# Patient Record
Sex: FEMALE | Race: WHITE | Employment: OTHER | URBAN - METROPOLITAN AREA
[De-identification: names, ages, dates, MRNs, and addresses within clinical notes are randomized per-mention and may not be internally consistent; named-entity substitution may affect disease eponyms.]

---

## 2017-01-05 ENCOUNTER — TELEPHONE (OUTPATIENT)
Dept: INTERNAL MEDICINE CLINIC | Facility: CLINIC | Age: 67
End: 2017-01-05

## 2017-01-05 NOTE — TELEPHONE ENCOUNTER
Received refill request for glimepiride 1 mg but it is not in the current list of medications. Notes from 5/2/16 and 7/21/16 includes medication in the note but 10/19/16 note doesn't. Is patient still supposed to take medication? Please advise.

## 2017-01-06 ENCOUNTER — TELEPHONE (OUTPATIENT)
Dept: INTERNAL MEDICINE CLINIC | Facility: CLINIC | Age: 67
End: 2017-01-06

## 2017-01-06 RX ORDER — GLIMEPIRIDE 1 MG/1
1 TABLET ORAL 2 TIMES DAILY
Qty: 180 TABLET | Refills: 1 | Status: SHIPPED | OUTPATIENT
Start: 2017-01-06 | End: 2017-07-06

## 2017-01-06 NOTE — TELEPHONE ENCOUNTER
Pharmacy request refill on glimepiride 1 mg tablet #180 directions 1 tablet 2 times a day. Not on patient's med list.  Pharmacy request comments \"patient is running low on meds.   If possible please approve refill ASAP\"  Left message on voice mail asking

## 2017-02-10 ENCOUNTER — TELEPHONE (OUTPATIENT)
Dept: INTERNAL MEDICINE CLINIC | Facility: CLINIC | Age: 67
End: 2017-02-10

## 2017-02-21 ENCOUNTER — TELEPHONE (OUTPATIENT)
Dept: INTERNAL MEDICINE CLINIC | Facility: CLINIC | Age: 67
End: 2017-02-21

## 2017-02-21 RX ORDER — INSULIN LISPRO 100 [IU]/ML
INJECTION, SUSPENSION SUBCUTANEOUS
Qty: 15 PEN | Refills: 0 | Status: SHIPPED | OUTPATIENT
Start: 2017-02-21 | End: 2017-04-21

## 2017-02-24 ENCOUNTER — TELEPHONE (OUTPATIENT)
Dept: INTERNAL MEDICINE CLINIC | Facility: CLINIC | Age: 67
End: 2017-02-24

## 2017-04-21 ENCOUNTER — TELEPHONE (OUTPATIENT)
Dept: INTERNAL MEDICINE CLINIC | Facility: CLINIC | Age: 67
End: 2017-04-21

## 2017-04-21 PROBLEM — J44.89 ASTHMA WITH COPD (CHRONIC OBSTRUCTIVE PULMONARY DISEASE) (HCC): Chronic | Status: ACTIVE | Noted: 2017-04-21

## 2017-04-21 PROBLEM — J44.9 ASTHMA WITH COPD (CHRONIC OBSTRUCTIVE PULMONARY DISEASE) (HCC): Chronic | Status: ACTIVE | Noted: 2017-04-21

## 2017-04-21 PROBLEM — J44.89 ASTHMA WITH COPD (CHRONIC OBSTRUCTIVE PULMONARY DISEASE): Chronic | Status: ACTIVE | Noted: 2017-04-21

## 2017-04-21 PROBLEM — J44.9 ASTHMA WITH COPD (CHRONIC OBSTRUCTIVE PULMONARY DISEASE): Chronic | Status: ACTIVE | Noted: 2017-04-21

## 2017-04-21 RX ORDER — INSULIN LISPRO 100 [IU]/ML
INJECTION, SUSPENSION SUBCUTANEOUS
Qty: 15 PEN | Refills: 0 | Status: SHIPPED | OUTPATIENT
Start: 2017-04-21 | End: 2017-06-13

## 2017-04-21 NOTE — TELEPHONE ENCOUNTER
Current outpatient prescriptions:     •  MetFORMIN HCl 1000 MG Oral Tab, Take 1 tablet (1,000 mg total) by mouth 2 (two) times daily with meals. , Disp: 180 tablet, Rfl: 0 REFILL   SHE IS OUT OF THIS MEDICATION

## 2017-04-27 ENCOUNTER — OFFICE VISIT (OUTPATIENT)
Dept: OPTOMETRY | Facility: CLINIC | Age: 67
End: 2017-04-27

## 2017-04-27 DIAGNOSIS — H52.4 HYPEROPIA WITH ASTIGMATISM AND PRESBYOPIA, BILATERAL: ICD-10-CM

## 2017-04-27 DIAGNOSIS — H52.203 HYPEROPIA WITH ASTIGMATISM AND PRESBYOPIA, BILATERAL: ICD-10-CM

## 2017-04-27 DIAGNOSIS — H25.13 AGE-RELATED NUCLEAR CATARACT OF BOTH EYES: ICD-10-CM

## 2017-04-27 DIAGNOSIS — H52.03 HYPEROPIA WITH ASTIGMATISM AND PRESBYOPIA, BILATERAL: ICD-10-CM

## 2017-04-27 DIAGNOSIS — E11.9 CONTROLLED TYPE 2 DIABETES MELLITUS WITHOUT COMPLICATION, UNSPECIFIED LONG TERM INSULIN USE STATUS: Primary | ICD-10-CM

## 2017-04-27 PROBLEM — H52.00 HYPEROPIA WITH ASTIGMATISM AND PRESBYOPIA: Status: ACTIVE | Noted: 2017-04-27

## 2017-04-27 PROBLEM — H52.209 HYPEROPIA WITH ASTIGMATISM AND PRESBYOPIA: Status: ACTIVE | Noted: 2017-04-27

## 2017-04-27 PROCEDURE — G0463 HOSPITAL OUTPT CLINIC VISIT: HCPCS | Performed by: OPTOMETRIST

## 2017-04-27 PROCEDURE — 99214 OFFICE O/P EST MOD 30 MIN: CPT | Performed by: OPTOMETRIST

## 2017-04-27 PROCEDURE — 92015 DETERMINE REFRACTIVE STATE: CPT | Performed by: OPTOMETRIST

## 2017-04-27 NOTE — PROGRESS NOTES
Dev Pearson is a 61year old female. HPI:     HPI     Diabetic Eye Exam   Diabetes characteristics include Type 2, controlled with diet, taking oral medications and on insulin. Duration of 9 years.            Comments   Patient is in for an annual diab (HUMALOG MIX 75/25 KWIKPEN) (75-25) 100 UNIT/ML SC SUPN INJECT 38 UNITS SUBCUTANEOUSLY IN THE MORNING AND 36 UNITS IN THE EVENING Disp: 15 pen Rfl: 2   furosemide 20 MG Oral Tab Take 1 tablet (20 mg total) by mouth once daily.  Disp: 90 tablet Rfl: 1   Dilt Visual Fields      Left Right   Result Full Full         Extraocular Movement      Right Left   Result Full, Ortho Full, Ortho         Neuro/Psych     Oriented x3:  Yes    Mood/Affect:  Normal      Dilation     Both eyes:  1.0% Mydriacyl @ 11:29 AM changes or problems with their vision     Hyperopia with astigmatism and presbyopia  New glasses RX given to update as needed. No orders of the defined types were placed in this encounter.        Meds This Visit:    No prescriptions requested or orde

## 2017-04-27 NOTE — PATIENT INSTRUCTIONS
Age related cataract  No treatment is required. Will continue to observe.     Diabetes type 2, controlled ([facility] [address]  I advised patient that there is no background diabetic retinopathy in either eye and that they should continue to keep their blood sugar under c

## 2017-05-05 ENCOUNTER — TELEPHONE (OUTPATIENT)
Dept: INTERNAL MEDICINE CLINIC | Facility: CLINIC | Age: 67
End: 2017-05-05

## 2017-05-05 NOTE — TELEPHONE ENCOUNTER
Medication refilled by Dr. Arnold Bonds 3/12/17; patient is requesting 90 day supply of Metoprolol. Medication pended. Last office visit 11/2016. Routing to RN [employer].

## 2017-05-12 RX ORDER — BENAZEPRIL HYDROCHLORIDE 20 MG/1
20 TABLET ORAL 2 TIMES DAILY
Qty: 60 TABLET | Refills: 0 | Status: SHIPPED | OUTPATIENT
Start: 2017-05-12 | End: 2017-06-12

## 2017-05-12 NOTE — TELEPHONE ENCOUNTER
LOV 11/28/16  Informed pt per Dr. Varma Brain message to make appt. appt scheduled on 5/18/17 medicare px  30 day supply sent to pharmacy. KPA pt verbalized understanding.

## 2017-06-10 ENCOUNTER — TELEPHONE (OUTPATIENT)
Dept: INTERNAL MEDICINE CLINIC | Facility: CLINIC | Age: 67
End: 2017-06-10

## 2017-06-13 ENCOUNTER — OFFICE VISIT (OUTPATIENT)
Dept: INTERNAL MEDICINE CLINIC | Facility: CLINIC | Age: 67
End: 2017-06-13

## 2017-06-13 ENCOUNTER — TELEPHONE (OUTPATIENT)
Dept: INTERNAL MEDICINE CLINIC | Facility: CLINIC | Age: 67
End: 2017-06-13

## 2017-06-13 VITALS
TEMPERATURE: 99 F | WEIGHT: 279.19 LBS | HEART RATE: 72 BPM | HEIGHT: 62 IN | SYSTOLIC BLOOD PRESSURE: 124 MMHG | DIASTOLIC BLOOD PRESSURE: 78 MMHG | BODY MASS INDEX: 51.38 KG/M2 | OXYGEN SATURATION: 90 %

## 2017-06-13 DIAGNOSIS — Z00.00 ROUTINE MEDICAL EXAM: Primary | ICD-10-CM

## 2017-06-13 DIAGNOSIS — R06.00 DOE (DYSPNEA ON EXERTION): ICD-10-CM

## 2017-06-13 DIAGNOSIS — N28.9 RENAL INSUFFICIENCY: ICD-10-CM

## 2017-06-13 DIAGNOSIS — R60.9 PERIPHERAL EDEMA: ICD-10-CM

## 2017-06-13 DIAGNOSIS — Z12.39 BREAST CANCER SCREENING: ICD-10-CM

## 2017-06-13 DIAGNOSIS — E11.9 TYPE 2 DIABETES MELLITUS WITHOUT COMPLICATION, WITH LONG-TERM CURRENT USE OF INSULIN (HCC): ICD-10-CM

## 2017-06-13 DIAGNOSIS — Z79.4 TYPE 2 DIABETES MELLITUS WITHOUT COMPLICATION, WITH LONG-TERM CURRENT USE OF INSULIN (HCC): ICD-10-CM

## 2017-06-13 DIAGNOSIS — I25.709 CORONARY ARTERY DISEASE INVOLVING CORONARY BYPASS GRAFT OF NATIVE HEART WITH ANGINA PECTORIS (HCC): ICD-10-CM

## 2017-06-13 DIAGNOSIS — T30.0 SKIN BURN: ICD-10-CM

## 2017-06-13 DIAGNOSIS — I10 HTN (HYPERTENSION), BENIGN: ICD-10-CM

## 2017-06-13 DIAGNOSIS — Z78.0 POSTMENOPAUSAL: ICD-10-CM

## 2017-06-13 DIAGNOSIS — E53.8 VITAMIN B12 DEFICIENCY: ICD-10-CM

## 2017-06-13 DIAGNOSIS — E78.00 PURE HYPERCHOLESTEROLEMIA: ICD-10-CM

## 2017-06-13 DIAGNOSIS — E04.1 THYROID NODULE: ICD-10-CM

## 2017-06-13 PROCEDURE — 81003 URINALYSIS AUTO W/O SCOPE: CPT | Performed by: INTERNAL MEDICINE

## 2017-06-13 PROCEDURE — 99396 PREV VISIT EST AGE 40-64: CPT | Performed by: INTERNAL MEDICINE

## 2017-06-13 PROCEDURE — 99214 OFFICE O/P EST MOD 30 MIN: CPT | Performed by: INTERNAL MEDICINE

## 2017-06-13 PROCEDURE — G0463 HOSPITAL OUTPT CLINIC VISIT: HCPCS | Performed by: INTERNAL MEDICINE

## 2017-06-13 PROCEDURE — 36415 COLL VENOUS BLD VENIPUNCTURE: CPT | Performed by: INTERNAL MEDICINE

## 2017-06-13 RX ORDER — BENAZEPRIL HYDROCHLORIDE 20 MG/1
20 TABLET ORAL 2 TIMES DAILY
Qty: 60 TABLET | Refills: 0 | Status: SHIPPED | OUTPATIENT
Start: 2017-06-13 | End: 2017-07-24

## 2017-06-13 RX ORDER — INSULIN LISPRO 100 [IU]/ML
INJECTION, SUSPENSION SUBCUTANEOUS
Qty: 15 PEN | Refills: 0 | Status: SHIPPED | OUTPATIENT
Start: 2017-06-13 | End: 2017-06-20

## 2017-06-13 RX ORDER — LANCETS 30 GAUGE
EACH MISCELLANEOUS
Qty: 50 EACH | Refills: 6 | Status: SHIPPED | OUTPATIENT
Start: 2017-06-13 | End: 2017-06-14

## 2017-06-13 RX ORDER — FUROSEMIDE 20 MG/1
20 TABLET ORAL
Qty: 90 TABLET | Refills: 1 | Status: SHIPPED | OUTPATIENT
Start: 2017-06-13 | End: 2017-08-25

## 2017-06-13 RX ORDER — ALBUTEROL SULFATE 2.5 MG/3ML
2.5 SOLUTION RESPIRATORY (INHALATION) EVERY 4 HOURS PRN
Qty: 30 AMPULE | Refills: 3 | Status: SHIPPED | OUTPATIENT
Start: 2017-06-13 | End: 2020-06-19

## 2017-06-13 NOTE — TELEPHONE ENCOUNTER
Pharmacy called, asked what blood glucose machine patient uses and what type of strips she needs.  Requested for patient to call back

## 2017-06-13 NOTE — PATIENT INSTRUCTIONS
ASSESSMENT/PLAN:   Routine medical exam  (primary encounter diagnosis) Check blood and urine. Type 2 diabetes mellitus without complication, with long-term current use of insulin (hcc) Slightly higher.  Increase insulin to 42 units premeals every 12 hrs A1C [E]  TSH W Reflex To Free T4 [E]  Vitamin B12    Meds This Visit:    Signed Prescriptions Disp Refills    Insulin Lispro Prot & Lispro (HUMALOG MIX 75/25 KWIKPEN) (75-25) 100 UNIT/ML SC SUPN 15 pen 0      Sig: INJECT 42 UNITS SUBCUTANEOUSLY IN THE [facility]

## 2017-06-13 NOTE — PROGRESS NOTES
HPI:    Patient ID: Florence Salomon is a 61year old female.     HPI  Florence Salomon is a 61year old female who presents for a complete physical exam.   HPI:   Patient presents with:  Physical     REASON FOR VISIT:    Florence Salomon is a 61year old female who pr for: HIV    Syphilis Screening Screen if pregnant or high risk No results found for: RPR    Hepatitis C Screening Screen those at high risk plus screen one time for adults born 1943-1 965 No results found for: HCVAB    Tuberculosis Screen if high risk No c (SYMBICORT) 160-4.5 MCG/ACT Inhalation Aerosol INHALE ONE PUFF BY MOUTH TWICE DAILY Disp: 3 Inhaler Rfl: 1   Albuterol Sulfate HFA (PROAIR HFA) 108 (90 BASE) MCG/ACT Inhalation Aero Soln Inhale 2 puffs into the lungs every 4 (four) hours as needed for [facility] Checks sugars 1 times daily. Fasting sugars average 140-190's. No lows. moderately active   Watches diabetic diet, low salt.   Wt Readings from Last 3 Encounters:  06/13/17 : 279 lb 3.2 oz (126.644 kg)  11/28/16 : 271 lb (122.925 kg)  10/19/16 : 276 lb 03:32 PM   ALT 13* 06/13/2017 03:32 PM   BILT 1.1 06/13/2017 03:32 PM   TSH 0.80 06/13/2017 03:32 PM          Lab Results  Component Value Date/Time   CHOLEST 129 06/13/2017 03:32 PM   HDL 32 06/13/2017 03:32 PM   TRIG 205* 06/13/2017 03:32 PM   LDL 56 06/ Vitro Strip CHECK BLOOD GLUCOSE EVERY 12 HOURS Disp: 48 each Rfl: 6   Budesonide-Formoterol Fumarate (SYMBICORT) 160-4.5 MCG/ACT Inhalation Aerosol INHALE ONE PUFF BY MOUTH TWICE DAILY Disp: 3 Inhaler Rfl: 1   Albuterol Sulfate HFA (PROAIR HFA) 108 (90 BAS HISTORY:  Obstetric History     T2    TAB0  SAB0  E0  M1  L4      Hx of Pap: all Paps normal           Review of Systems   Constitutional: Negative for fever, chills, diaphoresis, activity change, appetite change, fatigue and unexpected weight (126.644 kg)  BMI 51.05 kg/m2  SpO2 90%   L/min  Breastfeeding? No   No LMP recorded. Patient is postmenopausal.   PHYSICAL EXAM:    Physical Exam   Constitutional: She is oriented to person, place, and time.  Vital signs are normal. She appears well- exhibits no chemosis, no discharge, no exudate and no hordeolum. No foreign body present in the right eye. Left eye exhibits no chemosis, no discharge, no exudate and no hordeolum. No foreign body present in the left eye. Right conjunctiva is not injected. rigidity, no rebound, no guarding and no CVA tenderness. Genitourinary: No breast swelling, tenderness, discharge or bleeding. Pelvic exam was performed with patient supine. Musculoskeletal: She exhibits no edema.         Cervical back: She exhibits dec without complication, with long-term current use of insulin (hcc) Slightly higher. Increase insulin to 42 units premeals every 12 hrs. Call in 1 week with sugars. Check blood. Careful with diet and excercise at least 30 minutes 3-4 times a week.  Check sug Lispro Prot & Lispro (HUMALOG MIX 75/25 KWIKPEN) (75-25) 100 UNIT/ML SC SUPN 15 pen 0      Sig: INJECT 42 UNITS SUBCUTANEOUSLY IN THE MORNING AND 42 UNITS IN THE EVENING      albuterol sulfate (2.5 MG/3ML) 0.083% Inhalation Nebu Soln 30 ampule 3      Sig: Checks qam.  -     albuterol sulfate (2.5 MG/3ML) 0.083% Inhalation Nebu Soln; Take 3 mL (2.5 mg total) by nebulization every 4 (four) hours as needed for Wheezing. The patient indicates understanding of these issues and agrees to the plan.   Diet cou

## 2017-06-14 PROBLEM — E78.2 COMBINED HYPERLIPIDEMIA: Status: ACTIVE | Noted: 2017-06-14

## 2017-06-14 PROBLEM — IMO0001 UNCONTROLLED TYPE 2 DIABETES MELLITUS WITHOUT COMPLICATION, WITH LONG-TERM CURRENT USE OF INSULIN: Status: ACTIVE | Noted: 2017-06-14

## 2017-06-14 PROBLEM — E53.8 VITAMIN B 12 DEFICIENCY: Status: ACTIVE | Noted: 2017-06-14

## 2017-06-14 RX ORDER — LANCETS 30 GAUGE
EACH MISCELLANEOUS
Qty: 50 EACH | Refills: 6 | Status: SHIPPED | OUTPATIENT
Start: 2017-06-14 | End: 2018-09-10

## 2017-06-15 NOTE — PROGRESS NOTES
Quick Note:    CMP Normal (electrolytes, sugar, kidney and liver functions),   Lipid (choilesterol) is good, triglycerides are elevated possibly from sugars being high. Lower carb diet. Would recheck in 6 months. Fish oil thousand a day to keep blood thin.

## 2017-06-20 ENCOUNTER — NURSE ONLY (OUTPATIENT)
Dept: INTERNAL MEDICINE CLINIC | Facility: CLINIC | Age: 67
End: 2017-06-20

## 2017-06-20 ENCOUNTER — TELEPHONE (OUTPATIENT)
Dept: INTERNAL MEDICINE CLINIC | Facility: CLINIC | Age: 67
End: 2017-06-20

## 2017-06-20 DIAGNOSIS — E53.8 B12 DEFICIENCY: Primary | ICD-10-CM

## 2017-06-20 PROCEDURE — 96372 THER/PROPH/DIAG INJ SC/IM: CPT | Performed by: INTERNAL MEDICINE

## 2017-06-20 RX ORDER — CYANOCOBALAMIN 1000 UG/ML
1000 INJECTION INTRAMUSCULAR; SUBCUTANEOUS ONCE
Status: COMPLETED | OUTPATIENT
Start: 2017-06-20 | End: 2017-06-20

## 2017-06-20 RX ORDER — INSULIN LISPRO 100 [IU]/ML
INJECTION, SUSPENSION SUBCUTANEOUS
Qty: 45 PEN | Refills: 0 | Status: SHIPPED | OUTPATIENT
Start: 2017-06-20 | End: 2017-12-14

## 2017-06-20 RX ADMIN — CYANOCOBALAMIN 1000 MCG: 1000 INJECTION INTRAMUSCULAR; SUBCUTANEOUS at 11:45:00

## 2017-06-26 RX ORDER — SIMVASTATIN 40 MG
40 TABLET ORAL NIGHTLY
Qty: 90 TABLET | Refills: 1 | Status: SHIPPED | OUTPATIENT
Start: 2017-06-26 | End: 2017-12-02

## 2017-06-27 ENCOUNTER — NURSE ONLY (OUTPATIENT)
Dept: INTERNAL MEDICINE CLINIC | Facility: CLINIC | Age: 67
End: 2017-06-27

## 2017-06-27 RX ORDER — CYANOCOBALAMIN 1000 UG/ML
1000 INJECTION INTRAMUSCULAR; SUBCUTANEOUS ONCE
Status: COMPLETED | OUTPATIENT
Start: 2017-06-27 | End: 2017-06-27

## 2017-06-27 RX ADMIN — CYANOCOBALAMIN 1000 MCG: 1000 INJECTION INTRAMUSCULAR; SUBCUTANEOUS at 12:19:00

## 2017-07-06 ENCOUNTER — NURSE ONLY (OUTPATIENT)
Dept: INTERNAL MEDICINE CLINIC | Facility: CLINIC | Age: 67
End: 2017-07-06

## 2017-07-06 ENCOUNTER — TELEPHONE (OUTPATIENT)
Dept: INTERNAL MEDICINE CLINIC | Facility: CLINIC | Age: 67
End: 2017-07-06

## 2017-07-06 DIAGNOSIS — E53.8 VITAMIN B12 DEFICIENCY: Primary | ICD-10-CM

## 2017-07-06 PROCEDURE — 96372 THER/PROPH/DIAG INJ SC/IM: CPT | Performed by: INTERNAL MEDICINE

## 2017-07-06 RX ORDER — GLIMEPIRIDE 1 MG/1
TABLET ORAL
Qty: 180 TABLET | Refills: 1 | Status: SHIPPED | OUTPATIENT
Start: 2017-07-06 | End: 2017-12-14

## 2017-07-06 RX ORDER — CYANOCOBALAMIN 1000 UG/ML
1000 INJECTION INTRAMUSCULAR; SUBCUTANEOUS ONCE
Status: COMPLETED | OUTPATIENT
Start: 2017-07-06 | End: 2017-07-06

## 2017-07-06 RX ADMIN — CYANOCOBALAMIN 1000 MCG: 1000 INJECTION INTRAMUSCULAR; SUBCUTANEOUS at 11:32:00

## 2017-07-06 NOTE — PROGRESS NOTES
Patient seen for 3rd B12 injection. Verified name and date of birth. Verified order from test result 6/13/17. Administered shot to left deltoid, patient tolerated shot, no reactions noted.

## 2017-07-06 NOTE — TELEPHONE ENCOUNTER
Current Outpatient Prescriptions:     •  glimepiride 1 MG Oral Tab, Take 1 tablet (1 mg total) by mouth 2 (two) times daily. , Disp: 180 tablet, Rfl: 1    REFILL

## 2017-07-10 ENCOUNTER — TELEPHONE (OUTPATIENT)
Dept: INTERNAL MEDICINE CLINIC | Facility: CLINIC | Age: 67
End: 2017-07-10

## 2017-07-10 NOTE — TELEPHONE ENCOUNTER
Called patient mail order pharmacy [facility] pharmacy suggested she try this for her knee. Said she is willing to try.

## 2017-07-11 NOTE — TELEPHONE ENCOUNTER
Pharmacy called asking about lidocaine and fluocinonide, pharmacist was told that Dr. Black Nicolas did ok the Rx but was not sure if insurance would cover these meds, pharmacist stated that he would be checking with the insurance so pt wouldn't have to.

## 2017-07-11 NOTE — TELEPHONE ENCOUNTER
Patient notified should check if scripts will be covered under insurance, and let me know if she wants it sent to the pharmacy. Patient agreed.

## 2017-07-13 ENCOUNTER — NURSE ONLY (OUTPATIENT)
Dept: INTERNAL MEDICINE CLINIC | Facility: CLINIC | Age: 67
End: 2017-07-13

## 2017-07-13 ENCOUNTER — TELEPHONE (OUTPATIENT)
Dept: INTERNAL MEDICINE CLINIC | Facility: CLINIC | Age: 67
End: 2017-07-13

## 2017-07-13 DIAGNOSIS — E53.8 B12 DEFICIENCY: Primary | ICD-10-CM

## 2017-07-13 PROCEDURE — 96372 THER/PROPH/DIAG INJ SC/IM: CPT | Performed by: INTERNAL MEDICINE

## 2017-07-13 RX ORDER — CLOBETASOL PROPIONATE 0.5 MG/G
1 OINTMENT TOPICAL 2 TIMES DAILY
Qty: 30 G | Refills: 1 | Status: SHIPPED | OUTPATIENT
Start: 2017-07-13 | End: 2017-07-17

## 2017-07-13 RX ORDER — CYANOCOBALAMIN 1000 UG/ML
1000 INJECTION INTRAMUSCULAR; SUBCUTANEOUS ONCE
Status: COMPLETED | OUTPATIENT
Start: 2017-07-13 | End: 2017-07-13

## 2017-07-13 RX ADMIN — CYANOCOBALAMIN 1000 MCG: 1000 INJECTION INTRAMUSCULAR; SUBCUTANEOUS at 11:15:00

## 2017-07-13 NOTE — TELEPHONE ENCOUNTER
[id number] [address] please advise, I do not see fluocinonide in pt medication profile, past or present. Can send new rx for clobetasol 0.05% but need sig and rf.

## 2017-07-14 NOTE — TELEPHONE ENCOUNTER
Cirilo Jean from [address] called regarding Fluocinonide rx being changed to Clobetason 0.05%. Informed rx was sent to [facility] pharmacy and would have to verify with pt where she wanted rx to be sent.  Informed would call back if pt does want rx to be sent to A

## 2017-07-17 RX ORDER — CLOBETASOL PROPIONATE 0.5 MG/G
1 OINTMENT TOPICAL 2 TIMES DAILY
Qty: 30 G | Refills: 1 | Status: SHIPPED | OUTPATIENT
Start: 2017-07-17

## 2017-07-17 NOTE — TELEPHONE ENCOUNTER
[facility] is asking [facility] prescription for this patient. Fax is [phone]. See Rx.

## 2017-07-19 NOTE — TELEPHONE ENCOUNTER
Call from pharmacy regarding medication Cloberasol Propionate 0.05% insurance will only cover 120 g 30 day supply. please review. Ok to change?

## 2017-07-25 RX ORDER — BENAZEPRIL HYDROCHLORIDE 20 MG/1
20 TABLET ORAL 2 TIMES DAILY
Qty: 60 TABLET | Refills: 0 | Status: SHIPPED | OUTPATIENT
Start: 2017-07-25 | End: 2017-08-24

## 2017-07-26 ENCOUNTER — TELEPHONE (OUTPATIENT)
Dept: INTERNAL MEDICINE CLINIC | Facility: CLINIC | Age: 67
End: 2017-07-26

## 2017-07-26 NOTE — TELEPHONE ENCOUNTER
Per [employer] will not cover Fluocinonide but will cover clobetasol 120 gm. Informed pharmacist this medication was sent over on the 17th to [facility] pharmacy.  From previous telephone encounter patient has already picked up this prescription a

## 2017-08-14 ENCOUNTER — NURSE ONLY (OUTPATIENT)
Dept: INTERNAL MEDICINE CLINIC | Facility: CLINIC | Age: 67
End: 2017-08-14

## 2017-08-14 DIAGNOSIS — E53.8 B12 DEFICIENCY: Primary | ICD-10-CM

## 2017-08-14 PROCEDURE — 96372 THER/PROPH/DIAG INJ SC/IM: CPT | Performed by: INTERNAL MEDICINE

## 2017-08-14 RX ORDER — CYANOCOBALAMIN 1000 UG/ML
1000 INJECTION INTRAMUSCULAR; SUBCUTANEOUS ONCE
Status: COMPLETED | OUTPATIENT
Start: 2017-08-14 | End: 2017-08-14

## 2017-08-14 RX ADMIN — CYANOCOBALAMIN 1000 MCG: 1000 INJECTION INTRAMUSCULAR; SUBCUTANEOUS at 11:38:00

## 2017-08-14 NOTE — H&P
Patient presents to office for monthly B12 injection. Order verified on lab result note 6/13/17. 1 mL of cyanocobalamin was injected intramuscular in the Lt deltoid. Pt tolerated well, no complains or reactions.  Instructed pt to return in a month for last

## 2017-08-21 ENCOUNTER — HOSPITAL ENCOUNTER (OUTPATIENT)
Dept: NUCLEAR MEDICINE | Facility: HOSPITAL | Age: 67
Discharge: HOME | End: 2017-08-21
Attending: INTERNAL MEDICINE
Payer: MEDICARE

## 2017-08-21 ENCOUNTER — HOSPITAL ENCOUNTER (OUTPATIENT)
Dept: CV DIAGNOSTICS | Facility: HOSPITAL | Age: 67
Discharge: HOME | End: 2017-08-21
Attending: INTERNAL MEDICINE
Payer: MEDICARE

## 2017-08-21 DIAGNOSIS — R06.00 DOE (DYSPNEA ON EXERTION): ICD-10-CM

## 2017-08-21 PROCEDURE — 93017 CV STRESS TEST TRACING ONLY: CPT | Performed by: INTERNAL MEDICINE

## 2017-08-21 PROCEDURE — 93018 CV STRESS TEST I&R ONLY: CPT | Performed by: INTERNAL MEDICINE

## 2017-08-21 PROCEDURE — 78452 HT MUSCLE IMAGE SPECT MULT: CPT | Performed by: INTERNAL MEDICINE

## 2017-08-21 PROCEDURE — 93016 CV STRESS TEST SUPVJ ONLY: CPT | Performed by: INTERNAL MEDICINE

## 2017-08-21 RX ORDER — 0.9 % SODIUM CHLORIDE 0.9 %
VIAL (ML) INJECTION
Status: COMPLETED
Start: 2017-08-21 | End: 2017-08-21

## 2017-08-21 RX ADMIN — 0.9 % SODIUM CHLORIDE: 0.9 % VIAL (ML) INJECTION at 12:30:00

## 2017-08-22 ENCOUNTER — TELEPHONE (OUTPATIENT)
Dept: INTERNAL MEDICINE CLINIC | Facility: CLINIC | Age: 67
End: 2017-08-22

## 2017-08-22 RX ORDER — LIDOCAINE 50 MG/G
OINTMENT TOPICAL
Qty: 50 G | Refills: 1 | Status: SHIPPED | OUTPATIENT
Start: 2017-08-22 | End: 2019-09-26

## 2017-08-23 ENCOUNTER — TELEPHONE (OUTPATIENT)
Dept: INTERNAL MEDICINE CLINIC | Facility: CLINIC | Age: 67
End: 2017-08-23

## 2017-08-23 RX ORDER — BUDESONIDE AND FORMOTEROL FUMARATE DIHYDRATE 160; 4.5 UG/1; UG/1
AEROSOL RESPIRATORY (INHALATION)
Qty: 3 INHALER | Refills: 1 | Status: SHIPPED | OUTPATIENT
Start: 2017-08-23 | End: 2018-05-18

## 2017-08-24 ENCOUNTER — TELEPHONE (OUTPATIENT)
Dept: INTERNAL MEDICINE CLINIC | Facility: CLINIC | Age: 67
End: 2017-08-24

## 2017-08-24 RX ORDER — BENAZEPRIL HYDROCHLORIDE 20 MG/1
20 TABLET ORAL 2 TIMES DAILY
Qty: 60 TABLET | Refills: 2 | Status: SHIPPED | OUTPATIENT
Start: 2017-08-24 | End: 2017-08-25

## 2017-08-24 NOTE — TELEPHONE ENCOUNTER
Dr. Naima Burgess received a form from [facility] requesting test strips for pt. PCP wanted to know how many times a day does the pt test. Per pt she test twice daily, at the morning and after dinner.

## 2017-08-24 NOTE — TELEPHONE ENCOUNTER
•  Benazepril HCl 20 MG Oral Tab, Take 1 tablet (20 mg total) by mouth 2 (two) times daily. , Disp: 60 tablet, Rfl: 0  •

## 2017-08-25 ENCOUNTER — TELEPHONE (OUTPATIENT)
Dept: INTERNAL MEDICINE CLINIC | Facility: CLINIC | Age: 67
End: 2017-08-25

## 2017-08-25 RX ORDER — BENAZEPRIL HYDROCHLORIDE 20 MG/1
20 TABLET ORAL 2 TIMES DAILY
Qty: 60 TABLET | Refills: 0 | Status: SHIPPED | OUTPATIENT
Start: 2017-08-25 | End: 2018-01-18

## 2017-08-25 RX ORDER — FUROSEMIDE 20 MG/1
20 TABLET ORAL
Qty: 90 TABLET | Refills: 0 | Status: SHIPPED | OUTPATIENT
Start: 2017-08-25 | End: 2019-09-03

## 2017-08-25 NOTE — TELEPHONE ENCOUNTER
Hypertensive Medications  Protocol Criteria:  · Appointment scheduled in the past 6 months or in the next 3 months  · BMP or CMP in the past 12 months  · Creatinine result < 2  Recent Outpatient Visits            1 week ago B12 deficiency    [facility]

## 2017-09-14 ENCOUNTER — NURSE ONLY (OUTPATIENT)
Dept: INTERNAL MEDICINE CLINIC | Facility: CLINIC | Age: 67
End: 2017-09-14

## 2017-09-14 DIAGNOSIS — E53.8 B12 DEFICIENCY: Primary | ICD-10-CM

## 2017-09-14 PROCEDURE — 96372 THER/PROPH/DIAG INJ SC/IM: CPT | Performed by: INTERNAL MEDICINE

## 2017-09-14 RX ORDER — CYANOCOBALAMIN 1000 UG/ML
1000 INJECTION INTRAMUSCULAR; SUBCUTANEOUS ONCE
Status: COMPLETED | OUTPATIENT
Start: 2017-09-14 | End: 2017-09-14

## 2017-09-14 RX ADMIN — CYANOCOBALAMIN 1000 MCG: 1000 INJECTION INTRAMUSCULAR; SUBCUTANEOUS at 10:58:00

## 2017-09-14 NOTE — PROGRESS NOTES
Patient for nurse visit 3rd monthly B12 injection. Verified name and date of birth. Verified order from 6/13/17. Administered shot to right deltoid, patient tolerated shot, no reactions noted. Instructed to have blood work rechecked in 3 months.  Patient ve

## 2017-09-20 ENCOUNTER — HOSPITAL ENCOUNTER (OUTPATIENT)
Dept: ULTRASOUND IMAGING | Age: 67
Discharge: HOME | End: 2017-09-20
Attending: INTERNAL MEDICINE
Payer: MEDICARE

## 2017-09-20 ENCOUNTER — HOSPITAL ENCOUNTER (OUTPATIENT)
Dept: BONE DENSITY | Age: 67
Discharge: HOME | End: 2017-09-20
Attending: INTERNAL MEDICINE
Payer: MEDICARE

## 2017-09-20 ENCOUNTER — HOSPITAL ENCOUNTER (OUTPATIENT)
Dept: MAMMOGRAPHY | Age: 67
Discharge: HOME | End: 2017-09-20
Attending: INTERNAL MEDICINE
Payer: MEDICARE

## 2017-09-20 DIAGNOSIS — E04.1 THYROID NODULE: ICD-10-CM

## 2017-09-20 DIAGNOSIS — Z12.39 BREAST CANCER SCREENING: ICD-10-CM

## 2017-09-20 DIAGNOSIS — Z78.0 POSTMENOPAUSAL: ICD-10-CM

## 2017-09-20 PROCEDURE — 76536 US EXAM OF HEAD AND NECK: CPT | Performed by: INTERNAL MEDICINE

## 2017-09-20 PROCEDURE — 77080 DXA BONE DENSITY AXIAL: CPT | Performed by: INTERNAL MEDICINE

## 2017-09-20 PROCEDURE — 77067 SCR MAMMO BI INCL CAD: CPT | Performed by: INTERNAL MEDICINE

## 2017-09-20 NOTE — PROGRESS NOTES
Message sent to nursing staff. Mammogram is benign. Continue self breast exam qmonth. Please call patient.

## 2017-09-26 ENCOUNTER — TELEPHONE (OUTPATIENT)
Dept: INTERNAL MEDICINE CLINIC | Facility: CLINIC | Age: 67
End: 2017-09-26

## 2017-09-27 NOTE — TELEPHONE ENCOUNTER
Rx request for Diltiazem HCI 30 mg, PASSED Hypertension Protocol. Rx filled per protocol.      Hypertensive Medications  Protocol Criteria:  · Appointment scheduled in the past 6 months or in the next 3 months  · BMP or CMP in the past 12 months  · Creatini

## 2017-11-21 RX ORDER — BENAZEPRIL HYDROCHLORIDE 20 MG/1
20 TABLET ORAL 2 TIMES DAILY
Qty: 60 TABLET | Refills: 1 | Status: SHIPPED | OUTPATIENT
Start: 2017-11-21 | End: 2018-02-06

## 2017-12-05 RX ORDER — SIMVASTATIN 40 MG
40 TABLET ORAL NIGHTLY
Qty: 90 TABLET | Refills: 0 | Status: SHIPPED | OUTPATIENT
Start: 2017-12-05 | End: 2018-03-09

## 2017-12-05 NOTE — TELEPHONE ENCOUNTER
Refill protocol criteria met, rx sent.       Cholesterol Medications  Protocol Criteria:  · Appointment scheduled in the past 12 months or in the next 3 months  · ALT & LDL on file in the past 12 months  · ALT result < 80  · LDL result <130   Recent Outpati

## 2017-12-11 NOTE — TELEPHONE ENCOUNTER
6/13/2017 LOV.  Rx refilled per protocol    Hypertensive Medications  Protocol Criteria:  · Appointment scheduled in the past 6 months or in the next 3 months  · BMP or CMP in the past 12 months  · Creatinine result < 2  Recent Outpatient Visits

## 2017-12-13 RX ORDER — GLIMEPIRIDE 1 MG/1
TABLET ORAL
Qty: 180 TABLET | Refills: 1 | OUTPATIENT
Start: 2017-12-13

## 2017-12-13 RX ORDER — INSULIN LISPRO 100 [IU]/ML
INJECTION, SUSPENSION SUBCUTANEOUS
Refills: 0 | OUTPATIENT
Start: 2017-12-13

## 2017-12-14 RX ORDER — PEN NEEDLE, DIABETIC 32GX 5/32"
NEEDLE, DISPOSABLE MISCELLANEOUS
Qty: 200 EACH | Refills: 0 | Status: SHIPPED | OUTPATIENT
Start: 2017-12-14 | End: 2019-01-02

## 2017-12-14 RX ORDER — GLIMEPIRIDE 1 MG/1
TABLET ORAL
Qty: 180 TABLET | Refills: 0 | Status: SHIPPED | OUTPATIENT
Start: 2017-12-14 | End: 2018-01-18 | Stop reason: DRUGHIGH

## 2017-12-14 RX ORDER — INSULIN LISPRO 100 [IU]/ML
INJECTION, SUSPENSION SUBCUTANEOUS
Qty: 45 PEN | Refills: 0 | Status: SHIPPED | OUTPATIENT
Start: 2017-12-14 | End: 2018-01-18

## 2018-01-17 PROBLEM — M81.0 AGE-RELATED OSTEOPOROSIS WITHOUT CURRENT PATHOLOGICAL FRACTURE: Status: ACTIVE | Noted: 2018-01-17

## 2018-01-18 ENCOUNTER — OFFICE VISIT (OUTPATIENT)
Dept: INTERNAL MEDICINE CLINIC | Facility: CLINIC | Age: 68
End: 2018-01-18

## 2018-01-18 VITALS
DIASTOLIC BLOOD PRESSURE: 80 MMHG | HEIGHT: 62 IN | WEIGHT: 280 LBS | OXYGEN SATURATION: 93 % | SYSTOLIC BLOOD PRESSURE: 144 MMHG | BODY MASS INDEX: 51.53 KG/M2 | HEART RATE: 69 BPM | TEMPERATURE: 97 F

## 2018-01-18 DIAGNOSIS — N28.9 RENAL INSUFFICIENCY: ICD-10-CM

## 2018-01-18 DIAGNOSIS — E78.2 COMBINED HYPERLIPIDEMIA: ICD-10-CM

## 2018-01-18 DIAGNOSIS — I25.709 CORONARY ARTERY DISEASE INVOLVING CORONARY BYPASS GRAFT OF NATIVE HEART WITH ANGINA PECTORIS (HCC): ICD-10-CM

## 2018-01-18 DIAGNOSIS — Z28.21 IMMUNIZATION NOT CARRIED OUT BECAUSE OF PATIENT REFUSAL: ICD-10-CM

## 2018-01-18 DIAGNOSIS — IMO0001 UNCONTROLLED TYPE 2 DIABETES MELLITUS WITHOUT COMPLICATION, WITH LONG-TERM CURRENT USE OF INSULIN: ICD-10-CM

## 2018-01-18 DIAGNOSIS — Z23 NEED FOR VACCINATION: ICD-10-CM

## 2018-01-18 DIAGNOSIS — E53.8 B12 DEFICIENCY: Primary | ICD-10-CM

## 2018-01-18 DIAGNOSIS — M81.0 AGE-RELATED OSTEOPOROSIS WITHOUT CURRENT PATHOLOGICAL FRACTURE: ICD-10-CM

## 2018-01-18 DIAGNOSIS — E53.8 VITAMIN B 12 DEFICIENCY: ICD-10-CM

## 2018-01-18 DIAGNOSIS — I10 HTN (HYPERTENSION), BENIGN: ICD-10-CM

## 2018-01-18 DIAGNOSIS — E04.1 THYROID NODULE: ICD-10-CM

## 2018-01-18 LAB
ALBUMIN SERPL BCP-MCNC: 3.9 G/DL (ref 3.5–4.8)
ALBUMIN/GLOB SERPL: 1.1 {RATIO} (ref 1–2)
ALP SERPL-CCNC: 55 U/L (ref 32–100)
ALT SERPL-CCNC: 13 U/L (ref 14–54)
ANION GAP SERPL CALC-SCNC: 11 MMOL/L (ref 0–18)
AST SERPL-CCNC: 23 U/L (ref 15–41)
BILIRUB SERPL-MCNC: 0.8 MG/DL (ref 0.3–1.2)
BUN SERPL-MCNC: 19 MG/DL (ref 8–20)
BUN/CREAT SERPL: 17.4 (ref 10–20)
CALCIUM SERPL-MCNC: 9.2 MG/DL (ref 8.5–10.5)
CHLORIDE SERPL-SCNC: 108 MMOL/L (ref 95–110)
CHOLEST SERPL-MCNC: 109 MG/DL (ref 110–200)
CO2 SERPL-SCNC: 21 MMOL/L (ref 22–32)
CREAT SERPL-MCNC: 1.09 MG/DL (ref 0.5–1.5)
GLOBULIN PLAS-MCNC: 3.5 G/DL (ref 2.5–3.7)
GLUCOSE SERPL-MCNC: 67 MG/DL (ref 70–99)
HDLC SERPL-MCNC: 33 MG/DL
LDLC SERPL CALC-MCNC: 43 MG/DL (ref 0–99)
NONHDLC SERPL-MCNC: 76 MG/DL
OSMOLALITY UR CALC.SUM OF ELEC: 291 MOSM/KG (ref 275–295)
POTASSIUM SERPL-SCNC: 4.4 MMOL/L (ref 3.3–5.1)
PROT SERPL-MCNC: 7.4 G/DL (ref 5.9–8.4)
SODIUM SERPL-SCNC: 140 MMOL/L (ref 136–144)
TRIGL SERPL-MCNC: 163 MG/DL (ref 1–149)
VIT B12 SERPL-MCNC: 270 PG/ML (ref 181–914)

## 2018-01-18 PROCEDURE — 36415 COLL VENOUS BLD VENIPUNCTURE: CPT | Performed by: INTERNAL MEDICINE

## 2018-01-18 PROCEDURE — G0463 HOSPITAL OUTPT CLINIC VISIT: HCPCS | Performed by: INTERNAL MEDICINE

## 2018-01-18 PROCEDURE — 90670 PCV13 VACCINE IM: CPT | Performed by: INTERNAL MEDICINE

## 2018-01-18 PROCEDURE — 99214 OFFICE O/P EST MOD 30 MIN: CPT | Performed by: INTERNAL MEDICINE

## 2018-01-18 PROCEDURE — G0009 ADMIN PNEUMOCOCCAL VACCINE: HCPCS | Performed by: INTERNAL MEDICINE

## 2018-01-18 NOTE — PATIENT INSTRUCTIONS
ASSESSMENT/PLAN:   B12 deficiency  (primary encounter diagnosis) Check blood. Coronary artery disease involving coronary bypass graft of native heart with angina pectoris (hcc) Pt. Without symptoms. Htn (hypertension), benign ? Control.  Carefu 5 pen 1      Si units q12hrs.            Imaging & Referrals:  PNEUMOCOCCAL VACC, [facility]  ENT - INTERNAL

## 2018-01-18 NOTE — PROGRESS NOTES
HPI:    Patient ID: Vero Covington is a 74year old female. HPI   Hypertension  Patient is here for follow up of hypertension. BP at home: not check. Has been compliant with medications.   Exercise level: not active and has not been following low salt die 124/78  11/28/16 : 106/71    Labs:     Lab Results  Component Value Date/Time   GLU 98 06/13/2017 03:32 PM    06/13/2017 03:32 PM   K 4.5 06/13/2017 03:32 PM    06/13/2017 03:32 PM   CO2 25 06/13/2017 03:32 PM   CREATSERUM 1.08 06/13/2017 03:32 Tab TAKE 1 TABLET (20 MG TOTAL) BY MOUTH 2 (TWO) TIMES DAILY. Disp: 60 tablet Rfl: 1   furosemide 20 MG Oral Tab Take 1 tablet (20 mg total) by mouth once daily.  Disp: 90 tablet Rfl: 0   MetFORMIN HCl 1000 MG Oral Tab TAKE 1 TABLET BY MOUTH 2 TIMES DAILY W uncontrolled    • Unspecified essential hypertension       Past Surgical History:  10/03/2016: ANESTH,OPEN HEART SURGERY  No date: TUBAL LIGATION   Family History   Problem Relation Age of Onset   • Heart Disorder Father    • Hypertension Father    • Diabe visualized feet and the appearance is normal. Except all toenails with thickened dystrophic nails. Improving per pt. With vaseline. Pulsation pedal pulse exam of both lower legs/feet is normal as well.          ASSESSMENT/PLAN:   B12 deficiency  (primary Immunization Admin Counseling, 1st Component, 18 years and older    Meds This Visit:  Signed Prescriptions Disp Refills    insulin glargine (LANTUS SOLOSTAR) 100 UNIT/ML Subcutaneous Solution Pen-injector 5 pen 1      Si units q12hrs.            Imag

## 2018-01-19 LAB
25(OH)D3 SERPL-MCNC: 12.1 NG/ML
HBA1C MFR BLD: 6.3 % (ref 4–6)

## 2018-01-19 NOTE — PROGRESS NOTES
CMP Normal (electrolytes, sugar, and liver functions), decline in kidney function but stable from prior. No motrin, ibuprofen, advil, alleve, naprosyn  with these medications. Lipid (choilesterol) is good, except triglycerides are elevated.   Stay away fro

## 2018-01-20 ENCOUNTER — TELEPHONE (OUTPATIENT)
Dept: INTERNAL MEDICINE CLINIC | Facility: CLINIC | Age: 68
End: 2018-01-20

## 2018-01-20 PROBLEM — E55.9 VITAMIN D DEFICIENCY: Status: ACTIVE | Noted: 2018-01-20

## 2018-02-06 ENCOUNTER — TELEPHONE (OUTPATIENT)
Dept: INTERNAL MEDICINE CLINIC | Facility: CLINIC | Age: 68
End: 2018-02-06

## 2018-02-06 RX ORDER — BENAZEPRIL HYDROCHLORIDE 20 MG/1
20 TABLET ORAL 2 TIMES DAILY
Qty: 60 TABLET | Refills: 1 | Status: SHIPPED | OUTPATIENT
Start: 2018-02-06 | End: 2018-08-05 | Stop reason: SDUPTHER

## 2018-02-09 ENCOUNTER — TELEPHONE (OUTPATIENT)
Dept: INTERNAL MEDICINE CLINIC | Facility: CLINIC | Age: 68
End: 2018-02-09

## 2018-02-14 ENCOUNTER — NURSE ONLY (OUTPATIENT)
Dept: INTERNAL MEDICINE CLINIC | Facility: CLINIC | Age: 68
End: 2018-02-14

## 2018-02-14 DIAGNOSIS — E53.8 B12 DEFICIENCY: Primary | ICD-10-CM

## 2018-02-14 PROCEDURE — 96372 THER/PROPH/DIAG INJ SC/IM: CPT | Performed by: INTERNAL MEDICINE

## 2018-02-14 RX ORDER — CYANOCOBALAMIN 1000 UG/ML
1000 INJECTION INTRAMUSCULAR; SUBCUTANEOUS ONCE
Status: COMPLETED | OUTPATIENT
Start: 2018-02-14 | End: 2018-02-14

## 2018-02-14 RX ADMIN — CYANOCOBALAMIN 1000 MCG: 1000 INJECTION INTRAMUSCULAR; SUBCUTANEOUS at 11:41:00

## 2018-02-23 ENCOUNTER — NURSE ONLY (OUTPATIENT)
Dept: INTERNAL MEDICINE CLINIC | Facility: CLINIC | Age: 68
End: 2018-02-23

## 2018-02-23 DIAGNOSIS — E53.8 B12 DEFICIENCY: Primary | ICD-10-CM

## 2018-02-23 PROCEDURE — 96372 THER/PROPH/DIAG INJ SC/IM: CPT | Performed by: INTERNAL MEDICINE

## 2018-02-23 RX ORDER — CYANOCOBALAMIN 1000 UG/ML
1000 INJECTION INTRAMUSCULAR; SUBCUTANEOUS ONCE
Status: COMPLETED | OUTPATIENT
Start: 2018-02-23 | End: 2018-02-23

## 2018-02-23 RX ADMIN — CYANOCOBALAMIN 1000 MCG: 1000 INJECTION INTRAMUSCULAR; SUBCUTANEOUS at 10:44:00

## 2018-03-02 ENCOUNTER — NURSE ONLY (OUTPATIENT)
Dept: INTERNAL MEDICINE CLINIC | Facility: CLINIC | Age: 68
End: 2018-03-02

## 2018-03-02 DIAGNOSIS — E53.8 B12 DEFICIENCY: Primary | ICD-10-CM

## 2018-03-02 PROCEDURE — 96372 THER/PROPH/DIAG INJ SC/IM: CPT | Performed by: INTERNAL MEDICINE

## 2018-03-02 RX ORDER — CYANOCOBALAMIN 1000 UG/ML
1000 INJECTION INTRAMUSCULAR; SUBCUTANEOUS ONCE
Status: COMPLETED | OUTPATIENT
Start: 2018-03-02 | End: 2018-03-02

## 2018-03-02 RX ADMIN — CYANOCOBALAMIN 1000 MCG: 1000 INJECTION INTRAMUSCULAR; SUBCUTANEOUS at 10:38:00

## 2018-03-02 NOTE — PROGRESS NOTES
Patient for nurse visit. Verified name and date of birth. Verified order from 1/18/18. Administered shot to right deltoid, patient tolerated shot, no reactions noted.

## 2018-03-08 ENCOUNTER — NURSE ONLY (OUTPATIENT)
Dept: INTERNAL MEDICINE CLINIC | Facility: CLINIC | Age: 68
End: 2018-03-08

## 2018-03-08 ENCOUNTER — TELEPHONE (OUTPATIENT)
Dept: INTERNAL MEDICINE CLINIC | Facility: CLINIC | Age: 68
End: 2018-03-08

## 2018-03-08 DIAGNOSIS — E53.8 B12 DEFICIENCY: Primary | ICD-10-CM

## 2018-03-08 PROCEDURE — 96372 THER/PROPH/DIAG INJ SC/IM: CPT | Performed by: INTERNAL MEDICINE

## 2018-03-08 RX ORDER — LANCING DEVICE
EACH MISCELLANEOUS
COMMUNITY
Start: 2018-03-07 | End: 2018-08-30

## 2018-03-08 RX ORDER — INSULIN GLARGINE 100 [IU]/ML
INJECTION, SOLUTION SUBCUTANEOUS
Qty: 5 PEN | Refills: 1 | Status: SHIPPED | OUTPATIENT
Start: 2018-03-08 | End: 2018-03-08

## 2018-03-08 RX ORDER — BLOOD-GLUCOSE METER
EACH MISCELLANEOUS
COMMUNITY
Start: 2018-03-07 | End: 2018-08-30

## 2018-03-08 RX ORDER — CYANOCOBALAMIN 1000 UG/ML
1000 INJECTION INTRAMUSCULAR; SUBCUTANEOUS ONCE
Status: COMPLETED | OUTPATIENT
Start: 2018-03-08 | End: 2018-03-08

## 2018-03-08 RX ORDER — PEN NEEDLE, DIABETIC 31 GX5/16"
NEEDLE, DISPOSABLE MISCELLANEOUS
COMMUNITY
Start: 2018-03-07 | End: 2018-08-30

## 2018-03-08 RX ADMIN — CYANOCOBALAMIN 1000 MCG: 1000 INJECTION INTRAMUSCULAR; SUBCUTANEOUS at 11:02:00

## 2018-03-08 NOTE — TELEPHONE ENCOUNTER
Refill protocol criteria met, rx sent.       Diabetes Medications  Protocol Criteria:  · Appointment scheduled in the past 6 months or the next 3 months  · A1C < 7.5 in the past 6 months  · Creatinine in the past 12 months  · Creatinine result < 1.5   Recen

## 2018-03-08 NOTE — TELEPHONE ENCOUNTER
Was changed on last visit to 46 units twice a day. That is which patient should be taking. Was originally 35 units 2 times a day but then we increased it from last visit in January.   May be check with patient

## 2018-03-08 NOTE — TELEPHONE ENCOUNTER
[facility] pharmacy received refill for lantus solostar for pt to inject 35 units under skin daily, pt told pharmacy she is injecting 46 units daily, please advise of correct dosing.

## 2018-03-09 RX ORDER — SIMVASTATIN 40 MG
40 TABLET ORAL NIGHTLY
Qty: 90 TABLET | Refills: 0 | Status: SHIPPED | OUTPATIENT
Start: 2018-03-09 | End: 2018-06-11

## 2018-03-11 RX ORDER — GLIMEPIRIDE 1 MG/1
TABLET ORAL
Qty: 180 TABLET | Refills: 0 | OUTPATIENT
Start: 2018-03-11

## 2018-03-12 ENCOUNTER — TELEPHONE (OUTPATIENT)
Dept: INTERNAL MEDICINE CLINIC | Facility: CLINIC | Age: 68
End: 2018-03-12

## 2018-03-13 NOTE — TELEPHONE ENCOUNTER
[facility] pharmacy Simvastatin 40 mg has interaction with Diltiazem  Please change medication or call for prior authorization  [phone]

## 2018-03-16 NOTE — TELEPHONE ENCOUNTER
PA approved effective 3/14/2018-3/14/2019; [facility] pharmacy notified of the approval.

## 2018-03-26 ENCOUNTER — OFFICE VISIT (OUTPATIENT)
Dept: INTERNAL MEDICINE CLINIC | Facility: CLINIC | Age: 68
End: 2018-03-26

## 2018-03-26 ENCOUNTER — TELEPHONE (OUTPATIENT)
Dept: INTERNAL MEDICINE CLINIC | Facility: CLINIC | Age: 68
End: 2018-03-26

## 2018-03-26 VITALS
DIASTOLIC BLOOD PRESSURE: 82 MMHG | SYSTOLIC BLOOD PRESSURE: 162 MMHG | BODY MASS INDEX: 52.08 KG/M2 | HEIGHT: 62 IN | TEMPERATURE: 98 F | OXYGEN SATURATION: 90 % | HEART RATE: 55 BPM | WEIGHT: 283 LBS

## 2018-03-26 DIAGNOSIS — R09.89 DIMINISHED PULSES IN LOWER EXTREMITY: ICD-10-CM

## 2018-03-26 DIAGNOSIS — I10 HTN (HYPERTENSION), BENIGN: ICD-10-CM

## 2018-03-26 DIAGNOSIS — E78.2 COMBINED HYPERLIPIDEMIA: ICD-10-CM

## 2018-03-26 DIAGNOSIS — M81.0 AGE-RELATED OSTEOPOROSIS WITHOUT CURRENT PATHOLOGICAL FRACTURE: ICD-10-CM

## 2018-03-26 DIAGNOSIS — J44.9 ASTHMA WITH COPD (CHRONIC OBSTRUCTIVE PULMONARY DISEASE) (HCC): ICD-10-CM

## 2018-03-26 DIAGNOSIS — M25.561 ACUTE PAIN OF RIGHT KNEE: Primary | ICD-10-CM

## 2018-03-26 DIAGNOSIS — E66.01 OBESITY, CLASS III, BMI 40-49.9 (MORBID OBESITY) (HCC): ICD-10-CM

## 2018-03-26 DIAGNOSIS — E55.9 VITAMIN D DEFICIENCY: ICD-10-CM

## 2018-03-26 DIAGNOSIS — E53.8 B12 DEFICIENCY: ICD-10-CM

## 2018-03-26 DIAGNOSIS — I25.709 CORONARY ARTERY DISEASE INVOLVING CORONARY BYPASS GRAFT OF NATIVE HEART WITH ANGINA PECTORIS (HCC): ICD-10-CM

## 2018-03-26 DIAGNOSIS — IMO0001 UNCONTROLLED TYPE 2 DIABETES MELLITUS WITHOUT COMPLICATION, WITH LONG-TERM CURRENT USE OF INSULIN: ICD-10-CM

## 2018-03-26 DIAGNOSIS — E04.1 THYROID NODULE: ICD-10-CM

## 2018-03-26 PROCEDURE — 99214 OFFICE O/P EST MOD 30 MIN: CPT | Performed by: INTERNAL MEDICINE

## 2018-03-26 PROCEDURE — 99212 OFFICE O/P EST SF 10 MIN: CPT | Performed by: INTERNAL MEDICINE

## 2018-03-26 NOTE — PATIENT INSTRUCTIONS
ASSESSMENT/PLAN:   Asthma with copd (chronic obstructive pulmonary disease) (hcc) Using inhaler as needed. Acute pain of right knee  (primary encounter diagnosis) ? OA R knee. Check xrays.      Diminished pulses in lower extremity Check arterial U/S.

## 2018-03-26 NOTE — TELEPHONE ENCOUNTER
Patient informed that she needs to come back to the office or any outpatient lab to drop off a urine sample for Microalbumin. Specimen has to be repeated because it was left in the restroom in the urine hat without being poured in the label cup.  Verbalized

## 2018-03-26 NOTE — PROGRESS NOTES
HPI:    Patient ID: Kat Cooper is a 74year old female. Hypertension   Pertinent negatives include no chest pain, headaches, palpitations or shortness of breath. Leg Pain    The pain is present in the right knee. This is a new problem.  The current e Lab Results  Component Value Date/Time   CHOLEST 109 (L) 01/18/2018 12:35 PM   HDL 33 01/18/2018 12:35 PM   TRIG 163 (H) 01/18/2018 12:35 PM   LDL 43 01/18/2018 12:35 PM   [place] 76 01/18/2018 12:35 PM         Lab Results  Component Value Date/Time   A1C Skin: Negative for itching. Neurological: Negative for dizziness, tingling, tremors, seizures, syncope, weakness, light-headedness, numbness and headaches. All other systems reviewed and are negative.           Current Outpatient Prescriptions:  Esmer Taylor Lancets Does not apply Misc Checks qam.  ICD 10 Diagnosis Code:  E11.9  Patient using Accu-Chek FastClix lancets. Disp: 50 each Rfl: 6   aspirin 81 MG Oral Tab Take 81 mg by mouth daily. Disp:  Rfl:    Naproxen Sodium (ALEVE OR) Take by mouth.  Disp:  Rfl: Cardiovascular: Normal rate, regular rhythm, S1 normal, S2 normal, normal heart sounds and intact distal pulses. Pulses:       Carotid pulses are 2+ on the right side, and 2+ on the left side.        Radial pulses are 2+ on the right side, and 2+ on the Left ankle: She exhibits normal range of motion, no swelling, no ecchymosis, no deformity, no laceration and normal pulse. No tenderness. Achilles tendon normal. Achilles tendon exhibits no pain, no defect and normal St's test results.         Yesica Dumont Uncontrolled type 2 diabetes mellitus without complication, with long-term current use of insulin (hcc) Generally, better. Careful with diet and excercise at least 30 minutes 3-4 times a week. Check sugars at different times on different dates.  Careful wit

## 2018-04-03 ENCOUNTER — TELEPHONE (OUTPATIENT)
Dept: INTERNAL MEDICINE CLINIC | Facility: CLINIC | Age: 68
End: 2018-04-03

## 2018-04-03 NOTE — TELEPHONE ENCOUNTER
Patient is eligible for a 2018 Annual Medicare Health Assessment. Discussed in detail w/patient. Appt scheduled for 7/2/18.

## 2018-05-15 RX ORDER — INSULIN GLARGINE 100 [IU]/ML
INJECTION, SOLUTION SUBCUTANEOUS
Qty: 30 PEN | Refills: 1 | Status: SHIPPED | OUTPATIENT
Start: 2018-05-15 | End: 2018-11-09

## 2018-05-18 RX ORDER — BUDESONIDE AND FORMOTEROL FUMARATE DIHYDRATE 160; 4.5 UG/1; UG/1
AEROSOL RESPIRATORY (INHALATION)
Qty: 30.6 INHALER | Refills: 3 | Status: SHIPPED | OUTPATIENT
Start: 2018-05-18 | End: 2019-06-12

## 2018-06-03 RX ORDER — CHOLECALCIFEROL (VITAMIN D3) 1250 MCG
CAPSULE ORAL
Qty: 4 CAPSULE | Refills: 4 | Status: SHIPPED | OUTPATIENT
Start: 2018-06-03 | End: 2018-07-02

## 2018-06-03 NOTE — TELEPHONE ENCOUNTER
Review pended refill request as it does not fall under a protocol.     Recent Outpatient Visits            2 months ago Acute pain of right knee    [facility], Yehuda Grider MD    Office Visit    2 months ago B12 deficiency    [facility], Marvin Bravo    Nurse Only    3 months ago B12 deficiency    [facility], Marvin Bravo    Nurse Only    3 months ago B12 deficiency    [facility], Marvin Bravo    Nurse Only    3 months ago B12 deficiency    [facility], Marvin Bravo    Nurse Only        Future Appointments       Provider Department Appt Notes    In 4 weeks Addi Carlos MD [facility], [address]

## 2018-06-05 RX ORDER — BENAZEPRIL HYDROCHLORIDE 20 MG/1
20 TABLET ORAL 2 TIMES DAILY
Qty: 60 TABLET | Refills: 1 | Status: SHIPPED | OUTPATIENT
Start: 2018-06-05 | End: 2018-07-02

## 2018-06-11 RX ORDER — SIMVASTATIN 40 MG
40 TABLET ORAL NIGHTLY
Qty: 90 TABLET | Refills: 0 | Status: SHIPPED | OUTPATIENT
Start: 2018-06-11 | End: 2018-09-11

## 2018-06-12 RX ORDER — BENAZEPRIL HYDROCHLORIDE 20 MG/1
20 TABLET ORAL 2 TIMES DAILY
Qty: 60 TABLET | Refills: 1 | OUTPATIENT
Start: 2018-06-12

## 2018-07-02 ENCOUNTER — OFFICE VISIT (OUTPATIENT)
Dept: INTERNAL MEDICINE CLINIC | Facility: CLINIC | Age: 68
End: 2018-07-02

## 2018-07-02 VITALS
RESPIRATION RATE: 22 BRPM | DIASTOLIC BLOOD PRESSURE: 74 MMHG | BODY MASS INDEX: 50.97 KG/M2 | HEIGHT: 62 IN | TEMPERATURE: 98 F | WEIGHT: 277 LBS | HEART RATE: 61 BPM | SYSTOLIC BLOOD PRESSURE: 152 MMHG | OXYGEN SATURATION: 93 %

## 2018-07-02 DIAGNOSIS — E53.8 B12 DEFICIENCY: ICD-10-CM

## 2018-07-02 DIAGNOSIS — E53.8 VITAMIN B 12 DEFICIENCY: ICD-10-CM

## 2018-07-02 DIAGNOSIS — E04.1 THYROID NODULE: ICD-10-CM

## 2018-07-02 DIAGNOSIS — E55.9 VITAMIN D DEFICIENCY: ICD-10-CM

## 2018-07-02 DIAGNOSIS — R01.1 HEART MURMUR: ICD-10-CM

## 2018-07-02 DIAGNOSIS — J44.9 ASTHMA WITH COPD (CHRONIC OBSTRUCTIVE PULMONARY DISEASE) (HCC): ICD-10-CM

## 2018-07-02 DIAGNOSIS — E78.2 COMBINED HYPERLIPIDEMIA: ICD-10-CM

## 2018-07-02 DIAGNOSIS — I25.709 CORONARY ARTERY DISEASE INVOLVING CORONARY BYPASS GRAFT OF NATIVE HEART WITH ANGINA PECTORIS (HCC): ICD-10-CM

## 2018-07-02 DIAGNOSIS — L30.9 DERMATITIS: ICD-10-CM

## 2018-07-02 DIAGNOSIS — E53.8 VITAMIN B12 DEFICIENCY: ICD-10-CM

## 2018-07-02 DIAGNOSIS — Z00.00 ENCOUNTER FOR ANNUAL HEALTH EXAMINATION: ICD-10-CM

## 2018-07-02 DIAGNOSIS — I10 ESSENTIAL HYPERTENSION WITH GOAL BLOOD PRESSURE LESS THAN 130/80: ICD-10-CM

## 2018-07-02 DIAGNOSIS — I10 HTN (HYPERTENSION), BENIGN: ICD-10-CM

## 2018-07-02 DIAGNOSIS — R60.9 PERIPHERAL EDEMA: ICD-10-CM

## 2018-07-02 DIAGNOSIS — M81.0 AGE-RELATED OSTEOPOROSIS WITHOUT CURRENT PATHOLOGICAL FRACTURE: ICD-10-CM

## 2018-07-02 DIAGNOSIS — E66.01 OBESITY, CLASS III, BMI 40-49.9 (MORBID OBESITY) (HCC): Primary | ICD-10-CM

## 2018-07-02 DIAGNOSIS — IMO0001 UNCONTROLLED TYPE 2 DIABETES MELLITUS WITHOUT COMPLICATION, WITH LONG-TERM CURRENT USE OF INSULIN: ICD-10-CM

## 2018-07-02 DIAGNOSIS — Z23 NEED FOR VACCINATION: ICD-10-CM

## 2018-07-02 LAB
APPEARANCE: CLEAR
BILIRUBIN: NEGATIVE
CREAT UR-MCNC: 118.2 MG/DL
GLUCOSE (URINE DIPSTICK): NEGATIVE MG/DL
KETONES (URINE DIPSTICK): NEGATIVE MG/DL
LEUKOCYTES: NEGATIVE
MICROALBUMIN UR-MCNC: 3.4 MG/DL (ref 0–1.8)
MICROALBUMIN/CREAT UR: 28.8 MG/G{CREAT} (ref 0–20)
MULTISTIX LOT#: NORMAL NUMERIC
NITRITE, URINE: NEGATIVE
OCCULT BLOOD: NEGATIVE
PH, URINE: 5.5 (ref 4.5–8)
PROTEIN (URINE DIPSTICK): NEGATIVE MG/DL
SPECIFIC GRAVITY: 1.02 (ref 1–1.03)
URINE-COLOR: YELLOW
UROBILINOGEN,SEMI-QN: 0.2 MG/DL (ref 0–1.9)
VIT B12 SERPL-MCNC: 278 PG/ML (ref 181–914)

## 2018-07-02 PROCEDURE — 96160 PT-FOCUSED HLTH RISK ASSMT: CPT | Performed by: INTERNAL MEDICINE

## 2018-07-02 PROCEDURE — 90732 PPSV23 VACC 2 YRS+ SUBQ/IM: CPT | Performed by: INTERNAL MEDICINE

## 2018-07-02 PROCEDURE — 81003 URINALYSIS AUTO W/O SCOPE: CPT | Performed by: INTERNAL MEDICINE

## 2018-07-02 PROCEDURE — 90471 IMMUNIZATION ADMIN: CPT | Performed by: INTERNAL MEDICINE

## 2018-07-02 PROCEDURE — 36415 COLL VENOUS BLD VENIPUNCTURE: CPT | Performed by: INTERNAL MEDICINE

## 2018-07-02 RX ORDER — CLOTRIMAZOLE AND BETAMETHASONE DIPROPIONATE 10; .64 MG/G; MG/G
CREAM TOPICAL
Qty: 60 G | Refills: 3 | Status: SHIPPED | OUTPATIENT
Start: 2018-07-02 | End: 2019-10-25

## 2018-07-02 RX ORDER — CHOLECALCIFEROL (VITAMIN D3) 1250 MCG
CAPSULE ORAL
Qty: 4 CAPSULE | Refills: 4 | Status: SHIPPED | OUTPATIENT
Start: 2018-07-02 | End: 2018-09-24

## 2018-07-02 NOTE — ASSESSMENT & PLAN NOTE
9-20-17 dexa: CONCLUSION:   * Findings in the left femoral neck suggests osteoporosis, and there is increased fracture risk. The 10 year fracture risk for a major osteoporotic fracture is 12%, and for hip fracture is 3.7%.   * Findings in the total lumbar s

## 2018-07-02 NOTE — ASSESSMENT & PLAN NOTE
Careful with diet and excercise at least 30 minutes 3-4 times a week. Check sugars at different times on different dates. Careful with low sugars. Carry something with you and check sugar if can. Can carry faisal cracker, etc. Decrease carbohydrates.  But a
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
## 2018-07-02 NOTE — ASSESSMENT & PLAN NOTE
8-1&: lexiscan:  There is septal wall hypokinesis which may be related to prior cardiac surgery.  Otherwise normal regadenoson (Lexiscan) myocardial perfusion study with normal left ventricular ejection fraction.

## 2018-07-02 NOTE — PATIENT INSTRUCTIONS
Serena Chavez's SCREENING SCHEDULE   Tests on this list are recommended by your physician but may not be covered, or covered at this frequency, by your insurer. Please check with your insurance carrier before scheduling to verify coverage.    PREVENTATIVE following criteria:   • Men who are 73-68 years old and have smoked more than 100 cigarettes in their lifetime   • Anyone with a family history    Colorectal Cancer Screening  Covered up to Age 76     Colonoscopy Screen   Covered every 10 years- more often Immunizations      Influenza  Covered Annually No orders found for this or any previous visit.  Please get every year    Pneumococcal 13 (Prevnar)  Covered Once after 65   Orders placed or performed in visit on 01/18/18  -PNEUMOCOCCAL VACC, 13 MARKOS IM    P RELEVANT CHRONIC CONDITIONS:   Alejandro Beckham is a 74year old female who presents for a Medicare Assessment.      PLAN SUMMARY:   Diagnoses and all orders for this visit:    Obesity, Class III, BMI 40-49.9 (morbid obesity) (HCC)    HTN (hypertension), benign Apply cream q12hrs. For 1-2 weeks and between toes on dry skin.

## 2018-07-03 LAB — 25(OH)D3 SERPL-MCNC: 59.8 NG/ML

## 2018-07-13 ENCOUNTER — HOSPITAL ENCOUNTER (OUTPATIENT)
Dept: CV DIAGNOSTICS | Facility: HOSPITAL | Age: 68
Discharge: HOME | End: 2018-07-13
Attending: INTERNAL MEDICINE
Payer: MEDICARE

## 2018-07-13 DIAGNOSIS — R01.1 HEART MURMUR: ICD-10-CM

## 2018-07-13 PROCEDURE — 93306 TTE W/DOPPLER COMPLETE: CPT | Performed by: INTERNAL MEDICINE

## 2018-07-16 ENCOUNTER — TELEPHONE (OUTPATIENT)
Dept: INTERNAL MEDICINE CLINIC | Facility: CLINIC | Age: 68
End: 2018-07-16

## 2018-07-18 ENCOUNTER — HOSPITAL ENCOUNTER (OUTPATIENT)
Dept: ULTRASOUND IMAGING | Facility: HOSPITAL | Age: 68
Discharge: HOME | End: 2018-07-18
Attending: INTERNAL MEDICINE
Payer: MEDICARE

## 2018-07-18 DIAGNOSIS — R09.89 DIMINISHED PULSES IN LOWER EXTREMITY: ICD-10-CM

## 2018-07-18 PROCEDURE — 93923 UPR/LXTR ART STDY 3+ LVLS: CPT | Performed by: INTERNAL MEDICINE

## 2018-07-18 NOTE — TELEPHONE ENCOUNTER
Pt was notified by Fatou Conner. Notes recorded by Bruno Olmedo LPN on 0/30/1585 at 90:36 AM CDT  Patient called back, informed of test result. Instructed to follow up with cardiologist, information given for patient to see Dr. Jose M Cueto.  Patient verbalized unders

## 2018-08-05 RX ORDER — BENAZEPRIL HYDROCHLORIDE 20 MG/1
TABLET ORAL
Qty: 180 TABLET | Refills: 0 | Status: SHIPPED | OUTPATIENT
Start: 2018-08-05 | End: 2018-11-09

## 2018-08-05 NOTE — TELEPHONE ENCOUNTER
Refill passed per [facility], [facility] protocol.   Hypertensive Medications  Protocol Criteria:  · Appointment scheduled in the past 6 months or in the next 3 months  · BMP or CMP in the past 12 months  · Creatinine result < 2  Recent Outpatient Visits

## 2018-08-29 ENCOUNTER — TELEPHONE (OUTPATIENT)
Dept: INTERNAL MEDICINE CLINIC | Facility: CLINIC | Age: 68
End: 2018-08-29

## 2018-08-30 ENCOUNTER — TELEPHONE (OUTPATIENT)
Dept: INTERNAL MEDICINE CLINIC | Facility: CLINIC | Age: 68
End: 2018-08-30

## 2018-08-31 ENCOUNTER — TELEPHONE (OUTPATIENT)
Dept: INTERNAL MEDICINE CLINIC | Facility: CLINIC | Age: 68
End: 2018-08-31

## 2018-09-05 ENCOUNTER — TELEPHONE (OUTPATIENT)
Dept: INTERNAL MEDICINE CLINIC | Facility: CLINIC | Age: 68
End: 2018-09-05

## 2018-09-10 ENCOUNTER — OFFICE VISIT (OUTPATIENT)
Dept: INTERNAL MEDICINE CLINIC | Facility: CLINIC | Age: 68
End: 2018-09-10

## 2018-09-10 VITALS
DIASTOLIC BLOOD PRESSURE: 73 MMHG | BODY MASS INDEX: 50.97 KG/M2 | HEART RATE: 62 BPM | TEMPERATURE: 98 F | WEIGHT: 277 LBS | SYSTOLIC BLOOD PRESSURE: 158 MMHG | HEIGHT: 62 IN

## 2018-09-10 DIAGNOSIS — M81.0 AGE-RELATED OSTEOPOROSIS WITHOUT CURRENT PATHOLOGICAL FRACTURE: ICD-10-CM

## 2018-09-10 DIAGNOSIS — E04.1 THYROID NODULE: ICD-10-CM

## 2018-09-10 DIAGNOSIS — E55.9 VITAMIN D DEFICIENCY: ICD-10-CM

## 2018-09-10 DIAGNOSIS — I25.709 CORONARY ARTERY DISEASE INVOLVING CORONARY BYPASS GRAFT OF NATIVE HEART WITH ANGINA PECTORIS (HCC): ICD-10-CM

## 2018-09-10 DIAGNOSIS — IMO0001 UNCONTROLLED TYPE 2 DIABETES MELLITUS WITHOUT COMPLICATION, WITH LONG-TERM CURRENT USE OF INSULIN: ICD-10-CM

## 2018-09-10 DIAGNOSIS — E53.8 VITAMIN B 12 DEFICIENCY: ICD-10-CM

## 2018-09-10 DIAGNOSIS — M79.641 PAIN OF RIGHT HAND: Primary | ICD-10-CM

## 2018-09-10 DIAGNOSIS — I10 HTN (HYPERTENSION), BENIGN: ICD-10-CM

## 2018-09-10 LAB
ALBUMIN SERPL BCP-MCNC: 3.6 G/DL (ref 3.5–4.8)
ALBUMIN/GLOB SERPL: 1.1 {RATIO} (ref 1–2)
ALP SERPL-CCNC: 53 U/L (ref 32–100)
ALT SERPL-CCNC: 11 U/L (ref 14–54)
ANION GAP SERPL CALC-SCNC: 10 MMOL/L (ref 0–18)
AST SERPL-CCNC: 22 U/L (ref 15–41)
BILIRUB SERPL-MCNC: 0.7 MG/DL (ref 0.3–1.2)
BUN SERPL-MCNC: 15 MG/DL (ref 8–20)
BUN/CREAT SERPL: 17 (ref 10–20)
CALCIUM SERPL-MCNC: 9.1 MG/DL (ref 8.5–10.5)
CHLORIDE SERPL-SCNC: 107 MMOL/L (ref 95–110)
CHOLEST SERPL-MCNC: 104 MG/DL (ref 110–200)
CO2 SERPL-SCNC: 23 MMOL/L (ref 22–32)
CREAT SERPL-MCNC: 0.88 MG/DL (ref 0.5–1.5)
GLOBULIN PLAS-MCNC: 3.3 G/DL (ref 2.5–3.7)
GLUCOSE SERPL-MCNC: 103 MG/DL (ref 70–99)
HDLC SERPL-MCNC: 28 MG/DL
LDLC SERPL CALC-MCNC: 51 MG/DL (ref 0–99)
NONHDLC SERPL-MCNC: 76 MG/DL
OSMOLALITY UR CALC.SUM OF ELEC: 291 MOSM/KG (ref 275–295)
PATIENT FASTING: YES
POTASSIUM SERPL-SCNC: 4 MMOL/L (ref 3.3–5.1)
PROT SERPL-MCNC: 6.9 G/DL (ref 5.9–8.4)
SODIUM SERPL-SCNC: 140 MMOL/L (ref 136–144)
TRIGL SERPL-MCNC: 123 MG/DL (ref 1–149)
TSH SERPL-ACNC: 0.75 UIU/ML (ref 0.45–5.33)
VIT B12 SERPL-MCNC: 244 PG/ML (ref 181–914)

## 2018-09-10 PROCEDURE — 36415 COLL VENOUS BLD VENIPUNCTURE: CPT | Performed by: INTERNAL MEDICINE

## 2018-09-10 PROCEDURE — 99214 OFFICE O/P EST MOD 30 MIN: CPT | Performed by: INTERNAL MEDICINE

## 2018-09-10 RX ORDER — AMLODIPINE BESYLATE 5 MG/1
5 TABLET ORAL DAILY
Qty: 60 TABLET | Refills: 2 | Status: SHIPPED | OUTPATIENT
Start: 2018-09-10 | End: 2019-02-25

## 2018-09-10 NOTE — PROGRESS NOTES
HPI:    Patient ID: Jerry Benito is a 74year old female. HPI   Diabetes  Patient here for follow up of Diabetes. Has been taking medications regularly. Checks sugars 1 times daily. Fasting sugars average 104-120. Last week 40's. Felt it.    not act CO2 21 (L) 01/18/2018 12:35 PM    CREATSERUM 1.09 01/18/2018 12:35 PM    CA 9.2 01/18/2018 12:35 PM    AST 23 01/18/2018 12:35 PM    ALT 13 (L) 01/18/2018 12:35 PM    TSH 0.80 06/13/2017 03:32 PM        Lab Results   Component Value Date/Time    CHOLEST 1 BENAZEPRIL HCL 20 MG Oral Tab TAKE 1 TABLET BY MOUTH TWICE A DAY Disp: 180 tablet Rfl: 0   MetFORMIN HCl 1000 MG Oral Tab TAKE 1 TABLET BY MOUTH 2 TIMES DAILY WITH MEALS.  Disp: 180 tablet Rfl: 1   DILTIAZEM HCL 30 MG Oral Tab Take 1 tablet (30 mg total) by Clobetasol Propionate 0.05 % External Ointment Apply 1 Application topically 2 (two) times daily. Not to be placed on anywhere on face. Disp: 30 g Rfl: 1     Allergies:No Known Allergies    HISTORY:  No past medical history on file.    Past Surgical History Right elbow: She exhibits normal range of motion, no swelling, no effusion, no deformity and no laceration. No tenderness found.         Right wrist: She exhibits normal range of motion, no tenderness, no bony tenderness, no swelling, no effusion, no c * Findings in the left femoral neck suggests osteoporosis, and there is increased fracture risk. The 10 year fracture risk for a major osteoporotic fracture is 12%, and for hip fracture is 3.7%.   * Findings in the total lumbar spine are in the normal range Imaging & Referrals:  XR HAND (2 VIEWS), LEFT (CPT=73120)  XR HAND (2 VIEWS), RIGHT (CPT=73120)  US THYROID (RHI=[id number])        BM#[id number]

## 2018-09-10 NOTE — PATIENT INSTRUCTIONS
ASSESSMENT/PLAN:   Pain of right hand  (primary encounter diagnosis) Check Xrays. Try capascium cream on hands at night. Don't touch eyes with cream.     Thyroid nodule Check blood and U/S. Vitamin b 12 deficiency Check blood.      Coronary artery disea and careful with sores and ulcers on feet bilaterally.  Check eyes every year with dilated eye exam.     Orders Placed This Encounter      Vitamin B12      TSH W Reflex To Free T4 [E]      Comp Metabolic Panel (14) [E]      Lipid Panel [E]      Meds This Vi

## 2018-09-11 RX ORDER — SIMVASTATIN 40 MG
TABLET ORAL
Qty: 90 TABLET | Refills: 0 | Status: SHIPPED | OUTPATIENT
Start: 2018-09-11 | End: 2018-12-11

## 2018-09-24 RX ORDER — CHOLECALCIFEROL (VITAMIN D3) 1250 MCG
CAPSULE ORAL
Qty: 4 CAPSULE | Refills: 0 | Status: SHIPPED | OUTPATIENT
Start: 2018-09-24 | End: 2018-10-29

## 2018-10-01 ENCOUNTER — TELEPHONE (OUTPATIENT)
Dept: INTERNAL MEDICINE CLINIC | Facility: CLINIC | Age: 68
End: 2018-10-01

## 2018-10-01 NOTE — TELEPHONE ENCOUNTER
Patient no longer taking flucinonide does not know how pharmacy got patient or doctor name. Patient did not request refill. Said to please disregard.

## 2018-10-29 RX ORDER — CHOLECALCIFEROL (VITAMIN D3) 1250 MCG
CAPSULE ORAL
Qty: 4 CAPSULE | Refills: 0 | Status: SHIPPED | OUTPATIENT
Start: 2018-10-29 | End: 2019-10-17 | Stop reason: DRUGHIGH

## 2018-11-09 RX ORDER — BENAZEPRIL HYDROCHLORIDE 20 MG/1
TABLET ORAL
Qty: 180 TABLET | Refills: 0 | OUTPATIENT
Start: 2018-11-09

## 2018-11-09 RX ORDER — BENAZEPRIL HYDROCHLORIDE 20 MG/1
TABLET ORAL
Qty: 180 TABLET | Refills: 0 | Status: SHIPPED | OUTPATIENT
Start: 2018-11-09 | End: 2019-01-31

## 2018-11-09 RX ORDER — INSULIN GLARGINE 100 [IU]/ML
INJECTION, SOLUTION SUBCUTANEOUS
Qty: 10 PEN | Refills: 1 | Status: SHIPPED | OUTPATIENT
Start: 2018-11-09 | End: 2018-12-03

## 2018-12-03 ENCOUNTER — TELEPHONE (OUTPATIENT)
Dept: INTERNAL MEDICINE CLINIC | Facility: CLINIC | Age: 68
End: 2018-12-03

## 2018-12-04 ENCOUNTER — TELEPHONE (OUTPATIENT)
Dept: INTERNAL MEDICINE CLINIC | Facility: CLINIC | Age: 68
End: 2018-12-04

## 2018-12-04 RX ORDER — INSULIN GLARGINE 100 [IU]/ML
INJECTION, SOLUTION SUBCUTANEOUS
Qty: 40 PEN | Refills: 1 | Status: SHIPPED | OUTPATIENT
Start: 2018-12-04 | End: 2019-02-25

## 2018-12-04 NOTE — TELEPHONE ENCOUNTER
Current Outpatient Medications:   •  insulin glargine (LANTUS SOLOSTAR) 100 UNIT/ML Subcutaneous Solution Pen-injector, INJECT 46 UNITS SUBCUTANEOUSLY EVERY 12 HOURS, Disp: 10 pen, Rfl: 0  • •

## 2018-12-11 RX ORDER — SIMVASTATIN 40 MG
TABLET ORAL
Qty: 90 TABLET | Refills: 0 | Status: SHIPPED | OUTPATIENT
Start: 2018-12-11 | End: 2019-02-25

## 2019-01-02 RX ORDER — PEN NEEDLE, DIABETIC 32GX 5/32"
NEEDLE, DISPOSABLE MISCELLANEOUS
Qty: 1 BOX | Refills: 0 | Status: SHIPPED | OUTPATIENT
Start: 2019-01-02 | End: 2019-02-24

## 2019-01-07 RX ORDER — CHOLECALCIFEROL (VITAMIN D3) 1250 MCG
CAPSULE ORAL
Qty: 4 CAPSULE | Refills: 4 | OUTPATIENT
Start: 2019-01-07

## 2019-01-31 ENCOUNTER — TELEPHONE (OUTPATIENT)
Dept: INTERNAL MEDICINE CLINIC | Facility: CLINIC | Age: 69
End: 2019-01-31

## 2019-01-31 RX ORDER — BENAZEPRIL HYDROCHLORIDE 20 MG/1
20 TABLET ORAL 2 TIMES DAILY
Qty: 180 TABLET | Refills: 0 | Status: SHIPPED | OUTPATIENT
Start: 2019-01-31 | End: 2019-02-25

## 2019-02-25 ENCOUNTER — OFFICE VISIT (OUTPATIENT)
Dept: INTERNAL MEDICINE CLINIC | Facility: CLINIC | Age: 69
End: 2019-02-25
Payer: MEDICARE

## 2019-02-25 VITALS
TEMPERATURE: 98 F | HEIGHT: 62 IN | DIASTOLIC BLOOD PRESSURE: 74 MMHG | WEIGHT: 273 LBS | SYSTOLIC BLOOD PRESSURE: 132 MMHG | BODY MASS INDEX: 50.24 KG/M2 | HEART RATE: 69 BPM

## 2019-02-25 DIAGNOSIS — Z12.39 BREAST CANCER SCREENING: ICD-10-CM

## 2019-02-25 DIAGNOSIS — E53.8 VITAMIN B 12 DEFICIENCY: ICD-10-CM

## 2019-02-25 DIAGNOSIS — Z78.0 POST-MENOPAUSAL: ICD-10-CM

## 2019-02-25 DIAGNOSIS — IMO0001 UNCONTROLLED TYPE 2 DIABETES MELLITUS WITHOUT COMPLICATION, WITH LONG-TERM CURRENT USE OF INSULIN: Primary | ICD-10-CM

## 2019-02-25 LAB
ALBUMIN SERPL-MCNC: 4.1 G/DL (ref 3.4–5)
ALBUMIN/GLOB SERPL: 1.1 {RATIO} (ref 1–2)
ALP LIVER SERPL-CCNC: 68 U/L (ref 55–142)
ALT SERPL-CCNC: 16 U/L (ref 13–56)
ANION GAP SERPL CALC-SCNC: 8 MMOL/L (ref 0–18)
AST SERPL-CCNC: 17 U/L (ref 15–37)
BILIRUB SERPL-MCNC: 0.6 MG/DL (ref 0.1–2)
BUN BLD-MCNC: 23 MG/DL (ref 7–18)
BUN/CREAT SERPL: 21.1 (ref 10–20)
CALCIUM BLD-MCNC: 9.4 MG/DL (ref 8.5–10.1)
CHLORIDE SERPL-SCNC: 108 MMOL/L (ref 98–107)
CHOLEST SMN-MCNC: 129 MG/DL (ref ?–200)
CO2 SERPL-SCNC: 25 MMOL/L (ref 21–32)
CREAT BLD-MCNC: 1.09 MG/DL (ref 0.55–1.02)
GLOBULIN PLAS-MCNC: 3.7 G/DL (ref 2.8–4.4)
GLUCOSE BLD-MCNC: 98 MG/DL (ref 70–99)
HDLC SERPL-MCNC: 34 MG/DL (ref 40–59)
LDLC SERPL CALC-MCNC: 56 MG/DL (ref ?–100)
M PROTEIN MFR SERPL ELPH: 7.8 G/DL (ref 6.4–8.2)
NONHDLC SERPL-MCNC: 95 MG/DL (ref ?–130)
OSMOLALITY SERPL CALC.SUM OF ELEC: 296 MOSM/KG (ref 275–295)
POTASSIUM SERPL-SCNC: 4.7 MMOL/L (ref 3.5–5.1)
SODIUM SERPL-SCNC: 141 MMOL/L (ref 136–145)
TRIGL SERPL-MCNC: 196 MG/DL (ref 30–149)
VIT B12 SERPL-MCNC: 601 PG/ML (ref 193–986)
VLDLC SERPL CALC-MCNC: 39 MG/DL (ref 0–30)

## 2019-02-25 PROCEDURE — 36415 COLL VENOUS BLD VENIPUNCTURE: CPT | Performed by: INTERNAL MEDICINE

## 2019-02-25 PROCEDURE — 99214 OFFICE O/P EST MOD 30 MIN: CPT | Performed by: INTERNAL MEDICINE

## 2019-02-25 RX ORDER — AMLODIPINE BESYLATE 5 MG/1
5 TABLET ORAL DAILY
Qty: 180 TABLET | Refills: 1 | Status: SHIPPED | OUTPATIENT
Start: 2019-02-25 | End: 2020-02-20

## 2019-02-25 RX ORDER — SIMVASTATIN 40 MG
TABLET ORAL
Qty: 90 TABLET | Refills: 1 | Status: SHIPPED | OUTPATIENT
Start: 2019-02-25 | End: 2019-09-19

## 2019-02-25 RX ORDER — LANCING DEVICE
EACH MISCELLANEOUS
Qty: 100 EACH | Refills: 6 | Status: SHIPPED | OUTPATIENT
Start: 2019-02-25 | End: 2019-02-25

## 2019-02-25 RX ORDER — BETAMETHASONE DIPROPIONATE 0.05 %
OINTMENT (GRAM) TOPICAL
Qty: 45 G | Refills: 1 | Status: SHIPPED | OUTPATIENT
Start: 2019-02-25

## 2019-02-25 RX ORDER — PEN NEEDLE, DIABETIC 32GX 5/32"
NEEDLE, DISPOSABLE MISCELLANEOUS
Qty: 100 EACH | Refills: 1 | Status: SHIPPED | OUTPATIENT
Start: 2019-02-25 | End: 2019-02-25

## 2019-02-25 RX ORDER — BENAZEPRIL HYDROCHLORIDE 20 MG/1
20 TABLET ORAL 2 TIMES DAILY
Qty: 180 TABLET | Refills: 1 | Status: SHIPPED | OUTPATIENT
Start: 2019-02-25 | End: 2019-10-21

## 2019-02-25 RX ORDER — LANCING DEVICE
EACH MISCELLANEOUS
Qty: 100 EACH | Refills: 6 | Status: SHIPPED | OUTPATIENT
Start: 2019-02-25 | End: 2019-03-21

## 2019-02-25 NOTE — PATIENT INSTRUCTIONS
insulin glargine (LANTUS SOLOSTAR) 100 UNIT/ML Subcutaneous Solution Pen-injector INJECT 46 UNITS SUBCUTANEOUSLY EVERY 12 HOURS Disp: 40 pen Rfl: 1   PHARMACIST CHOICE ALCOHOL 70 % External Pads Checks qam. Disp: 100 each Rfl: 5   amLODIPine Besylate 5 MG nebulization every 4 (four) hours as needed for Wheezing. Disp: 30 ampule Rfl: 3   Albuterol Sulfate HFA (PROAIR HFA) 108 (90 BASE) MCG/ACT Inhalation Aero Soln Inhale 2 puffs into the lungs every 4 (four) hours as needed for Wheezing.  Disp: 1 Inhaler Rfl: 1 TABLET BY MOUTH TWICE A DAY   • simvastatin 40 MG Oral Tab 90 tablet 1     Sig: TAKE 1 TABLET BY MOUTH EVERY DAY AT NIGHT   • insulin glargine (LANTUS SOLOSTAR) 100 UNIT/ML Subcutaneous Solution Pen-injector 40 pen 1     Sig: INJECT 46 UNITS SUBCUTANEOUS

## 2019-02-25 NOTE — PROGRESS NOTES
HPI:    Patient ID: Alfred Philippe is a 79year old female. HPI   Diabetes  Patient here for follow up of Diabetes. Has been taking medications regularly. Checks sugars 1 times daily. Fasting sugars average 100-120's. QAM and before dinner.    not act CREATSERUM 0.88 09/10/2018 11:29 AM    CA 9.1 09/10/2018 11:29 AM    AST 22 09/10/2018 11:29 AM    ALT 11 (L) 09/10/2018 11:29 AM    TSH 0.75 09/10/2018 11:29 AM        Lab Results   Component Value Date/Time    CHOLEST 104 (L) 09/10/2018 11:28 AM    HDL 2 180 tablet Rfl: 1   Glucose Blood In Vitro Strip Checks QAM ICD 10 Diagnosis Code:  E11.9. Patient using Prodigy strips. Disp: 100 strip Rfl: 6   Lancet Devices (SIMPLE DIAGNOST[employer] LANCING DEV) Does not apply Misc Checks.  Disp: 100 each Rfl: 6   VITAMIN D medical history on file.    Past Surgical History:   Procedure Laterality Date   • ANESTH,OPEN HEART SURGERY  10/03/2016   • TUBAL LIGATION        Family History   Problem Relation Age of Onset   • Heart Disorder Father    • Hypertension Father    • Diabete appearance is normal. except onichomycosis all toenails B/L. Bilateral sensation of both feet is abnormal.  Pulsation pedal pulse exam of both lower legs/feet is normal as well.        ASSESSMENT/PLAN:   Uncontrolled type 2 diabetes mellitus without compl • PHARMACIST CHOICE ALCOHOL 70 % External Pads 100 each 5     Sig: Checks qam.   • amLODIPine Besylate 5 MG Oral Tab 180 tablet 1     Sig: Take 1 tablet (5 mg total) by mouth daily.    • Glucose Blood In Vitro Strip 100 strip 6     Sig: Checks QAM ICD 10

## 2019-02-26 LAB
EST. AVERAGE GLUCOSE BLD GHB EST-MCNC: 157 MG/DL (ref 68–126)
HBA1C MFR BLD HPLC: 7.1 % (ref ?–5.7)

## 2019-02-27 RX ORDER — CHOLECALCIFEROL (VITAMIN D3) 1250 MCG
CAPSULE ORAL
Qty: 4 CAPSULE | Refills: 0 | OUTPATIENT
Start: 2019-02-27

## 2019-02-28 ENCOUNTER — PATIENT OUTREACH (OUTPATIENT)
Dept: CASE MANAGEMENT | Age: 69
End: 2019-02-28

## 2019-02-28 NOTE — PROGRESS NOTES
Called patient; was unable to talk.  Pt is requesting information about CCM program to be mailed to her home    Information mailed

## 2019-03-03 NOTE — PROGRESS NOTES
CMP Normal (electrolytes, sugar, and liver functions), decline in kidney function from prior. No motrin, ibuprofen, advil, alleve, naprosyn  with these medications.    Lipid (choilesterol) is good, But triglycerides are elevated possibly from elevated sugars

## 2019-03-08 RX ORDER — CHOLECALCIFEROL (VITAMIN D3) 1250 MCG
CAPSULE ORAL
Qty: 4 CAPSULE | Refills: 0 | OUTPATIENT
Start: 2019-03-08

## 2019-03-18 ENCOUNTER — TELEPHONE (OUTPATIENT)
Dept: INTERNAL MEDICINE CLINIC | Facility: CLINIC | Age: 69
End: 2019-03-18

## 2019-03-18 RX ORDER — CHOLECALCIFEROL (VITAMIN D3) 1250 MCG
CAPSULE ORAL
Qty: 4 CAPSULE | Refills: 4 | Status: SHIPPED | OUTPATIENT
Start: 2019-03-18 | End: 2019-06-08

## 2019-03-19 ENCOUNTER — TELEPHONE (OUTPATIENT)
Dept: INTERNAL MEDICINE CLINIC | Facility: CLINIC | Age: 69
End: 2019-03-19

## 2019-03-19 DIAGNOSIS — E55.9 VITAMIN D DEFICIENCY: Primary | ICD-10-CM

## 2019-03-20 RX ORDER — INSULIN GLARGINE 100 [IU]/ML
INJECTION, SOLUTION SUBCUTANEOUS
Qty: 30 PEN | Refills: 1 | Status: SHIPPED | OUTPATIENT
Start: 2019-03-20 | End: 2019-09-26

## 2019-03-21 RX ORDER — LANCING DEVICE
EACH MISCELLANEOUS
Qty: 100 EACH | Refills: 6 | Status: SHIPPED | OUTPATIENT
Start: 2019-03-21

## 2019-03-21 NOTE — TELEPHONE ENCOUNTER
You indicated you wanted patient's vitamin D level rechecked prior to ordering. Can you please place order. Patient will have drawn.

## 2019-04-26 ENCOUNTER — HOSPITAL ENCOUNTER (OUTPATIENT)
Dept: GENERAL RADIOLOGY | Age: 69
Discharge: HOME | End: 2019-04-26
Attending: INTERNAL MEDICINE
Payer: MEDICARE

## 2019-04-26 ENCOUNTER — HOSPITAL ENCOUNTER (OUTPATIENT)
Dept: MAMMOGRAPHY | Age: 69
Discharge: HOME | End: 2019-04-26
Attending: INTERNAL MEDICINE
Payer: MEDICARE

## 2019-04-26 ENCOUNTER — HOSPITAL ENCOUNTER (OUTPATIENT)
Dept: BONE DENSITY | Age: 69
Discharge: HOME | End: 2019-04-26
Attending: INTERNAL MEDICINE
Payer: MEDICARE

## 2019-04-26 DIAGNOSIS — M79.641 PAIN OF RIGHT HAND: ICD-10-CM

## 2019-04-26 DIAGNOSIS — Z78.0 POST-MENOPAUSAL: ICD-10-CM

## 2019-04-26 DIAGNOSIS — Z12.39 BREAST CANCER SCREENING: ICD-10-CM

## 2019-04-26 PROCEDURE — 77067 SCR MAMMO BI INCL CAD: CPT | Performed by: INTERNAL MEDICINE

## 2019-04-26 PROCEDURE — 77063 BREAST TOMOSYNTHESIS BI: CPT | Performed by: INTERNAL MEDICINE

## 2019-05-22 ENCOUNTER — MA CHART PREP (OUTPATIENT)
Dept: FAMILY MEDICINE CLINIC | Facility: CLINIC | Age: 69
End: 2019-05-22

## 2019-06-07 NOTE — TELEPHONE ENCOUNTER
Refill passed per [facility], [facility] protocol.   Hypertensive Medications  Protocol Criteria:  · Appointment scheduled in the past 6 months or in the next 3 months  · BMP or CMP in the past 12 months  · Creatinine result < 2  Recent Outpatient Visits

## 2019-06-10 ENCOUNTER — TELEPHONE (OUTPATIENT)
Dept: CASE MANAGEMENT | Age: 69
End: 2019-06-10

## 2019-06-10 RX ORDER — CHOLECALCIFEROL (VITAMIN D3) 1250 MCG
CAPSULE ORAL
Qty: 12 CAPSULE | Refills: 1 | Status: SHIPPED | OUTPATIENT
Start: 2019-06-10 | End: 2019-09-26

## 2019-06-10 NOTE — TELEPHONE ENCOUNTER
Patient is eligible for a 2019 Annual Medicare Health Assessment. Left message to call back [phone].

## 2019-06-12 RX ORDER — BUDESONIDE AND FORMOTEROL FUMARATE DIHYDRATE 160; 4.5 UG/1; UG/1
AEROSOL RESPIRATORY (INHALATION)
Qty: 3 INHALER | Refills: 1 | Status: SHIPPED | OUTPATIENT
Start: 2019-06-12 | End: 2019-12-10

## 2019-06-13 RX ORDER — ALBUTEROL SULFATE 90 UG/1
2 AEROSOL, METERED RESPIRATORY (INHALATION) EVERY 4 HOURS PRN
Qty: 1 INHALER | Refills: 1 | Status: SHIPPED | OUTPATIENT
Start: 2019-06-13 | End: 2020-06-19

## 2019-06-13 NOTE — TELEPHONE ENCOUNTER
Refill passed per [facility], [facility] protocol.   Refill Protocol Appointment Criteria  · Appointment scheduled in the past 6 months or in the next 3 months  Recent Outpatient Visits            3 months ago Uncontrolled type 2 diabetes mellitus without complica

## 2019-06-18 ENCOUNTER — MED REC SCAN ONLY (OUTPATIENT)
Dept: INTERNAL MEDICINE CLINIC | Facility: CLINIC | Age: 69
End: 2019-06-18

## 2019-07-23 NOTE — TELEPHONE ENCOUNTER
Refill sent to pharmacy Subjective:    Ivy Flores is a 54y.o. year old male seen for follow up of diabetes. He also has hypertension and hyperlipidemia. Diabetic Review of Systems - medication compliance: compliant most of the time, diabetic diet compliance: noncompliant much of the time, home glucose monitoring: is performed regularly, fasting values range 200s, further diabetic ROS: no polyuria or polydipsia, no chest pain, dyspnea or TIA's, no numbness, tingling or pain in extremities, last eye exam unknown. Other symptoms and concerns: requesting medication for ED. Patient further repots he is on Lasix 80 mg tid but feels like this has not been effective to decrease edema. Denies dyspnea with exertion. There is no problem list on file for this patient. Current Outpatient Medications   Medication Sig Dispense Refill    rosuvastatin (CRESTOR) 40 mg tablet Take 40 mg by mouth nightly.  carvedilol (COREG) 12.5 mg tablet Take  by mouth two (2) times daily (with meals).  amLODIPine (NORVASC) 10 mg tablet Take  by mouth daily.  furosemide (LASIX) 80 mg tablet Take  by mouth three (3) times daily.  glucagon (GLUCAGEN) 1 mg injection 0.5 mg by IntraVENous route once.  insulin glargine (LANTUS SOLOSTAR U-100 INSULIN) 100 unit/mL (3 mL) inpn 10 Units by SubCUTAneous route.  insulin lispro (HUMALOG KWIKPEN INSULIN) 100 unit/mL kwikpen 10 Units by SubCUTAneous route Before breakfast, lunch, and dinner.         Allergies   Allergen Reactions    Penicillins Unknown (comments)     Had reaction as  a baby     Past Surgical History:   Procedure Laterality Date    HX ORTHOPAEDIC      L wrist surgery     Family History   Problem Relation Age of Onset    COPD Mother     Arthritis Mother     Hypertension Mother     Diabetes Father       Wt Readings from Last 3 Encounters:   07/23/19 301 lb (136.5 kg)     BP Readings from Last 3 Encounters:   No data found for BP     Last Diabetic Foot Exam on: n/a    Objective:  Visit Vitals  /76 (BP 1 Location: Right arm)   Pulse 88   Temp 98.3 °F (36.8 °C) (Oral)   Resp 16   Ht 5' 9\" (1.753 m)   Wt 301 lb (136.5 kg)   SpO2 96%   BMI 44.45 kg/m²     Awake and alert in no acute distress   Neck supple without lymphadenopathy, no carotid artery bruits auscultated bilaterally. No thyromegaly   Lungs clear throughout   S1 S2 RRR without ectopy or murmur auscultated. Extremities: no clubbing, cyanosis, 1+ peripheral edema   Abdomen - firm, nontender, nondistended, no masses or organomegaly  Integumentary: no rashes   Reviewed vital signs       Diabetic foot exam:     Left Foot:   Visual Exam: normal    Pulse DP: 2+ (normal)   Filament test: normal sensation    Vibratory sensation: normal      Right Foot:   Visual Exam: normal    Pulse DP: 2+ (normal)   Filament test: normal sensation    Vibratory sensation: normal        Assessment/Plan:    ICD-10-CM ICD-9-CM    1. Essential hypertension J15 599.7 METABOLIC PANEL, COMPREHENSIVE   2. Type 2 diabetes mellitus with hyperglycemia, with long-term current use of insulin ([facility]) E11.65 250.00 HEMOGLOBIN A1C WITH EAG    Z79.4 790.29 LIPID PANEL     B63.55 METABOLIC PANEL, COMPREHENSIVE      MICROALBUMIN, UR, RAND W/ MICROALB/CREAT RATIO   3. Morbid obesity with BMI of 40.0-44.9, adult ([facility] E66.01 278.01     Z68.41 V85.41    4. Nuclear sclerotic cataract of both eyes H25.13 366.16 REFERRAL TO OPHTHALMOLOGY   5.  Chorioretinal scar of right eye H31.001 363.30 REFERRAL TO OPHTHALMOLOGY   6. CKD (chronic kidney disease) stage 2, GFR 60-89 ml/min N18.2 585.2 CBC WITH AUTOMATED DIFF      REFERRAL TO NEPHROLOGY     Orders Placed This Encounter    HEMOGLOBIN A1C WITH EAG    LIPID PANEL    METABOLIC PANEL, COMPREHENSIVE    CBC WITH AUTOMATED DIFF    MICROALBUMIN, UR, RAND W/ MICROALB/CREAT RATIO    CBC WITH AUTOMATED DIFF    METABOLIC PANEL, COMPREHENSIVE    LIPID PANEL    MICROALBUMIN, UR, RAND    HEMOGLOBIN A1C WITH EAG    DISCONTD: rosuvastatin (CRESTOR) 40 mg tablet    DISCONTD: carvedilol (COREG) 12.5 mg tablet    DISCONTD: amLODIPine (NORVASC) 10 mg tablet    furosemide (LASIX) 80 mg tablet    glucagon (GLUCAGEN) 1 mg injection    DISCONTD: insulin glargine (LANTUS SOLOSTAR U-100 INSULIN) 100 unit/mL (3 mL) inpn    DISCONTD: insulin lispro (HUMALOG KWIKPEN INSULIN) 100 unit/mL kwikpen    DISCONTD: lisinopril (PRINIVIL, ZESTRIL) 10 mg tablet    DISCONTD: atorvastatin (LIPITOR) 40 mg tablet    insulin glargine (LANTUS SOLOSTAR U-100 INSULIN) 100 unit/mL (3 mL) inpn    insulin lispro (HUMALOG KWIKPEN INSULIN) 100 unit/mL kwikpen    amLODIPine (NORVASC) 10 mg tablet    carvedilol (COREG) 12.5 mg tablet    lisinopril (PRINIVIL, ZESTRIL) 10 mg tablet    atorvastatin (LIPITOR) 40 mg tablet       Issues reviewed with him: low cholesterol diet, weight control and daily exercise discussed. I have discussed the diagnosis with the patient and the intended plan as seen in the above orders. The patient has received an after-visit summary and questions were answered concerning future plans. I have discussed medication side effects and warnings with the patient as well. Patient agreeable with above plan and verbalizes understanding. Follow-up and Dispositions    · Return in about 1 month (around 8/20/2019) for DM/HTN.

## 2019-08-27 ENCOUNTER — MED REC SCAN ONLY (OUTPATIENT)
Dept: INTERNAL MEDICINE CLINIC | Facility: CLINIC | Age: 69
End: 2019-08-27

## 2019-09-04 RX ORDER — FUROSEMIDE 20 MG/1
20 TABLET ORAL
Qty: 90 TABLET | Refills: 1 | Status: SHIPPED | OUTPATIENT
Start: 2019-09-04 | End: 2020-02-24

## 2019-09-04 NOTE — TELEPHONE ENCOUNTER
Spoke with patient ( verified) RE: [facility] request for Furosemide:    Patient states she had noticed bilateral foot swelling x 2 weeks, left greater than right. Patient denies redness, drainage or calf pain at this time.     \"I haven't been taking the [employer]

## 2019-09-09 RX ORDER — SIMVASTATIN 40 MG
TABLET ORAL
Qty: 90 TABLET | Refills: 1 | OUTPATIENT
Start: 2019-09-09

## 2019-09-09 NOTE — TELEPHONE ENCOUNTER
Please review; protocol failed.     Requested Prescriptions     Pending Prescriptions Disp Refills   • METFORMIN HCL 1000 MG Oral Tab [Pharmacy Med Name: METFORMIN HCL 1,000 MG TABLET] 180 tablet 1     Sig: TAKE 1 TABLET BY MOUTH TWICE A DAY WITH FOOD

## 2019-09-19 RX ORDER — SIMVASTATIN 40 MG
TABLET ORAL
Qty: 30 TABLET | Refills: 0 | Status: ON HOLD | OUTPATIENT
Start: 2019-09-19 | End: 2019-10-15

## 2019-09-19 NOTE — TELEPHONE ENCOUNTER
Refill passed per [facility] [email or web address], [facility] protocol. 30 day supply issued per protocol.  Due for office visit    Cholesterol Medications  Protocol Criteria:  · Appointment scheduled in the past 12 months or in the next 3 months  · ALT & LDL on file in the past 12 m

## 2019-09-26 ENCOUNTER — MED REC SCAN ONLY (OUTPATIENT)
Dept: INTERNAL MEDICINE CLINIC | Facility: CLINIC | Age: 69
End: 2019-09-26

## 2019-09-26 ENCOUNTER — OFFICE VISIT (OUTPATIENT)
Dept: INTERNAL MEDICINE CLINIC | Facility: CLINIC | Age: 69
End: 2019-09-26
Payer: MEDICARE

## 2019-09-26 ENCOUNTER — TELEPHONE (OUTPATIENT)
Dept: INTERNAL MEDICINE CLINIC | Facility: CLINIC | Age: 69
End: 2019-09-26

## 2019-09-26 VITALS
OXYGEN SATURATION: 93 % | WEIGHT: 286 LBS | BODY MASS INDEX: 52.63 KG/M2 | HEART RATE: 74 BPM | DIASTOLIC BLOOD PRESSURE: 72 MMHG | HEIGHT: 62 IN | TEMPERATURE: 98 F | SYSTOLIC BLOOD PRESSURE: 124 MMHG

## 2019-09-26 DIAGNOSIS — Z00.00 ENCOUNTER FOR ANNUAL HEALTH EXAMINATION: ICD-10-CM

## 2019-09-26 DIAGNOSIS — IMO0001 UNCONTROLLED TYPE 2 DIABETES MELLITUS WITHOUT COMPLICATION, WITH LONG-TERM CURRENT USE OF INSULIN: ICD-10-CM

## 2019-09-26 DIAGNOSIS — R07.89 OTHER CHEST PAIN: ICD-10-CM

## 2019-09-26 DIAGNOSIS — D22.9 ATYPICAL NEVI: ICD-10-CM

## 2019-09-26 DIAGNOSIS — I10 HTN (HYPERTENSION), BENIGN: Primary | ICD-10-CM

## 2019-09-26 DIAGNOSIS — F43.21 GRIEVING: ICD-10-CM

## 2019-09-26 DIAGNOSIS — B35.3 TINEA PEDIS OF BOTH FEET: ICD-10-CM

## 2019-09-26 LAB
ALBUMIN SERPL-MCNC: 3.8 G/DL
ALBUMIN SERPL-MCNC: 3.8 G/DL (ref 3.4–5)
ALBUMIN/GLOB SERPL: 1 {RATIO}
ALBUMIN/GLOB SERPL: 1 {RATIO} (ref 1–2)
ALP LIVER SERPL-CCNC: 69 U/L (ref 55–142)
ALP SERPL-CCNC: 69 U/L
ALT SERPL-CCNC: 17 U/L
ALT SERPL-CCNC: 17 U/L (ref 13–56)
ANION GAP SERPL CALC-SCNC: 10 MMOL/L
ANION GAP SERPL CALC-SCNC: 10 MMOL/L (ref 0–18)
AST SERPL-CCNC: 16 U/L
AST SERPL-CCNC: 16 U/L (ref 15–37)
BILIRUB SERPL-MCNC: 0.7 MG/DL
BILIRUB SERPL-MCNC: 0.7 MG/DL (ref 0.1–2)
BILIRUB UR QL: NEGATIVE
BUN BLD-MCNC: 26 MG/DL (ref 7–18)
BUN SERPL-MCNC: 26 MG/DL
BUN/CREAT SERPL: 24.1
BUN/CREAT SERPL: 24.1 (ref 10–20)
CALCIUM BLD-MCNC: 9.2 MG/DL (ref 8.5–10.1)
CALCIUM SERPL-MCNC: 9.2 MG/DL
CHLORIDE SERPL-SCNC: 107 MMOL/L
CHLORIDE SERPL-SCNC: 107 MMOL/L (ref 98–112)
CHOLEST SERPL-MCNC: 133 MG/DL
CHOLEST SMN-MCNC: 133 MG/DL (ref ?–200)
CHOLEST/HDLC SERPL: NORMAL {RATIO}
CLARITY UR: CLEAR
CO2 SERPL-SCNC: 22 MMOL/L
CO2 SERPL-SCNC: 22 MMOL/L (ref 21–32)
COLOR UR: YELLOW
CREAT BLD-MCNC: 1.08 MG/DL (ref 0.55–1.02)
CREAT SERPL-MCNC: 1.08 MG/DL
CREAT UR-SCNC: 113 MG/DL
EST. AVERAGE GLUCOSE BLD GHB EST-MCNC: 200 MG/DL (ref 68–126)
GLOBULIN PLAS-MCNC: 3.7 G/DL (ref 2.8–4.4)
GLOBULIN SER-MCNC: 3.7 G/DL
GLUCOSE BLD-MCNC: 181 MG/DL (ref 70–99)
GLUCOSE SERPL-MCNC: 181 MG/DL
GLUCOSE UR-MCNC: NEGATIVE MG/DL
HBA1C MFR BLD HPLC: 8.6 % (ref ?–5.7)
HDLC SERPL-MCNC: 34 MG/DL
HDLC SERPL-MCNC: 34 MG/DL (ref 40–59)
HGB UR QL STRIP.AUTO: NEGATIVE
KETONES UR-MCNC: NEGATIVE MG/DL
LDLC SERPL CALC-MCNC: 59 MG/DL
LDLC SERPL CALC-MCNC: 59 MG/DL (ref ?–100)
LENGTH OF FAST TIME PATIENT: NORMAL H
LENGTH OF FAST TIME PATIENT: NORMAL H
M PROTEIN MFR SERPL ELPH: 7.5 G/DL (ref 6.4–8.2)
MICROALBUMIN UR-MCNC: 5.21 MG/DL
MICROALBUMIN/CREAT 24H UR-RTO: 46.1 UG/MG (ref ?–30)
NITRITE UR QL STRIP.AUTO: NEGATIVE
NONHDLC SERPL-MCNC: 99 MG/DL
NONHDLC SERPL-MCNC: 99 MG/DL (ref ?–130)
OSMOLALITY SERPL CALC.SUM OF ELEC: 297 MOSM/KG (ref 275–295)
PATIENT FASTING: YES
PATIENT FASTING: YES
PH UR: 6 [PH] (ref 5–8)
POTASSIUM SERPL-SCNC: 5.1 MMOL/L
POTASSIUM SERPL-SCNC: 5.1 MMOL/L (ref 3.5–5.1)
PROT SERPL-MCNC: 7.5 G/DL
PROT UR-MCNC: NEGATIVE MG/DL
RBC #/AREA URNS AUTO: <1 /HPF
SODIUM SERPL-SCNC: 139 MMOL/L
SODIUM SERPL-SCNC: 139 MMOL/L (ref 136–145)
SP GR UR STRIP: 1.02 (ref 1–1.03)
TRIGL SERPL-MCNC: 199 MG/DL
TRIGL SERPL-MCNC: 199 MG/DL (ref 30–149)
UROBILINOGEN UR STRIP-ACNC: <2
VLDLC SERPL CALC-MCNC: 40 MG/DL
VLDLC SERPL CALC-MCNC: 40 MG/DL (ref 0–30)
WBC #/AREA URNS AUTO: 4 /HPF

## 2019-09-26 PROCEDURE — 99397 PER PM REEVAL EST PAT 65+ YR: CPT | Performed by: INTERNAL MEDICINE

## 2019-09-26 PROCEDURE — G0439 PPPS, SUBSEQ VISIT: HCPCS | Performed by: INTERNAL MEDICINE

## 2019-09-26 PROCEDURE — 99497 ADVNCD CARE PLAN 30 MIN: CPT | Performed by: INTERNAL MEDICINE

## 2019-09-26 PROCEDURE — 36415 COLL VENOUS BLD VENIPUNCTURE: CPT | Performed by: INTERNAL MEDICINE

## 2019-09-26 PROCEDURE — 96160 PT-FOCUSED HLTH RISK ASSMT: CPT | Performed by: INTERNAL MEDICINE

## 2019-09-26 RX ORDER — CLOTRIMAZOLE AND BETAMETHASONE DIPROPIONATE 10; .64 MG/G; MG/G
CREAM TOPICAL
Qty: 60 G | Refills: 3 | Status: SHIPPED | OUTPATIENT
Start: 2019-09-26 | End: 2020-07-14

## 2019-09-26 RX ORDER — LIDOCAINE 50 MG/G
OINTMENT TOPICAL
Qty: 50 G | Refills: 1 | Status: SHIPPED | OUTPATIENT
Start: 2019-09-26 | End: 2019-12-03

## 2019-09-26 RX ORDER — NITROGLYCERIN 0.4 MG/1
0.4 TABLET SUBLINGUAL EVERY 5 MIN PRN
Qty: 20 TABLET | Refills: 0 | Status: SHIPPED | OUTPATIENT
Start: 2019-09-26 | End: 2020-02-20

## 2019-09-26 NOTE — PATIENT INSTRUCTIONS
ASSESSMENT AND OTHER RELEVANT CHRONIC CONDITIONS:   Susy Kim is a 79year old female who presents for a Medicare Assessment. PLAN SUMMARY:   Diagnoses and all orders for this visit:    HTN (hypertension), benign Good control.  Careful with diet and the tongue every 5 (five) minutes as needed for Chest pain. Serena Chavez's SCREENING SCHEDULE   Tests on this list are recommended by your physician but may not be covered, or covered at this frequency, by your insurer.  Please check with your insuran Covered up to Age 76     Colonoscopy Screen   Covered every 10 years- more often if abnormal Colonoscopy due on 05/02/2026 Update Health Maintenance if applicable    Flex Sigmoidoscopy Screen  Covered every 5 years No results found for this or any previous performed in visit on 01/18/18   • PNEUMOCOCCAL VACC, 13 MARKOS IM    Please get once after your 65th birthday    Pneumococcal 23 (Pneumovax)  Covered Once after 65 Orders placed or performed in visit on 07/02/18   • PNEUMOCOCCAL IMM (PNEUMOVAX)    Please get

## 2019-09-26 NOTE — TELEPHONE ENCOUNTER
nitroGLYCERIN 0.4 MG Sublingual SL Tab    This medication only comes in quantity of 25s can they change the quantity?  The original prescription has 20

## 2019-09-26 NOTE — PROGRESS NOTES
HPI:    Patient ID: Teresa Clarke is a 79year old female. HPI  Teresa Clarke is a 79year old female who presents for a complete physical exam.   HPI:   Patient presents with:   Well Adult: medicare annual     Hypertension  Patient is here for follow up o BENAZEPRIL HCL 20 MG OR TABS - - Take 1 tablet (20 mg total) by mouth 2 (two) times daily. Take 1 tablet (20 mg total) by mouth 2 (two) times daily.  TAKE 1 TABLET BY MOUTH TWICE A DAY   Weight (enc vitals) - 286 lb 273 lb - -   BP (enc vitals) - 124/72 132 nitroGLYCERIN 0.4 MG Sublingual SL Tab Place 1 tablet (0.4 mg total) under the tongue every 5 (five) minutes as needed for Chest pain. Disp: 20 tablet Rfl: 0   clotrimazole-betamethasone 1-0.05 % External Cream Apply q12hrs. For 2 weeks on dry skin.  Disp: albuterol sulfate (2.5 MG/3ML) 0.083% Inhalation Nebu Soln Take 3 mL (2.5 mg total) by nebulization every 4 (four) hours as needed for Wheezing.  Disp: 30 ampule Rfl: 3   Lancet Devices (SIMPLE DIAGNOSTICS LANCING DEV) Does not apply Misc Checks sugar twice Smokeless tobacco: Never Used    Substance and Sexual Activity      Alcohol use: No        Alcohol/week: 0.0 standard drinks      Drug use: No    Social History Narrative      No exposures. Lives in apartment now on first floor.          OB History   G2 Neurological: Negative for dizziness, tremors, seizures, syncope, facial asymmetry, speech difficulty, weakness, light-headedness, numbness and headaches. Hematological: Negative for adenopathy. Psychiatric/Behavioral: Positive for dysphoric mood.  Hieu Ledezma Insulin Pen Needle (BD PEN NEEDLE ALEKSANDER U/F) 32G X 4 MM Does not apply Misc INJECT TWICE A DAY Disp: 100 each Rfl: 5   Benazepril HCl 20 MG Oral Tab Take 1 tablet (20 mg total) by mouth 2 (two) times daily.  Disp: 180 tablet Rfl: 1   insulin glargine (LANTUS No past medical history on file.    Past Surgical History:   Procedure Laterality Date   • ANESTH,OPEN HEART SURGERY  10/03/2016   • TUBAL LIGATION        Family History   Problem Relation Age of Onset   • Heart Disorder Father    • Hypertension Father    • Nose: Nose normal. No mucosal edema, rhinorrhea, nose lacerations, sinus tenderness, nasal deformity or nasal septal hematoma. No epistaxis. No foreign bodies. Right sinus exhibits no maxillary sinus tenderness and no frontal sinus tenderness.  Left sinus Dorsalis pedis pulses are 2+ on the right side, and 2+ on the left side. Posterior tibial pulses are 2+ on the right side, and 2+ on the left side. Pulmonary/Chest: Effort normal and breath sounds normal. No accessory muscle usage or stridor. Left: No supraclavicular adenopathy present. Neurological: She is alert and oriented to person, place, and time. Skin: Skin is warm and dry. No rash noted. She is not diaphoretic. No erythema. No pallor.    Psychiatric: Her speech is normal. She ex She has been screened for Falls and is low risk: Fall/Risk Scorin    Cognitive Assessment   She had a completely normal cognitive assessment- see flowsheet entries    Functional Ability/Status   Jerry Cart has some abnormal functions as listed below: Patient Active Problem List:     Multinodular goiter (nontoxic)     Age-related cataract     CAD (coronary artery disease) of bypass graft     HTN (hypertension), benign     Obesity, Class III, BMI 40-49.9 (morbid obesity) ([facility] [address]     Asthma with COPD (chron simvastatin 40 MG Oral Tab TAKE 1 TABLET BY MOUTH EVERY DAY AT NIGHT   METFORMIN HCL 1000 MG Oral Tab TAKE 1 TABLET BY MOUTH TWICE A DAY WITH FOOD   furosemide 20 MG Oral Tab Take 1 tablet (20 mg total) by mouth once daily.    Albuterol Sulfate HFA (PROAIR Her family history includes Breast Cancer (age of onset: 58) in her mother; Cancer in her mother; Diabetes in her mother; Heart Disorder in her father and mother; Heart Disorder (age of onset: 34) in her daughter; Hypertension in her father.    SOCIAL HISTO I misunderstand what others are saying and make inappropriate responses:  No I avoid social activities because I cannot hear well and fear I will reply improperly:  No   Family members and friends have told me they think I may have hearing loss:   No ? Control. Check blood. Careful with diet and excercise at least 30 minutes 3-4 times a week. Check sugars at different times on different dates. Careful with low sugars. Carry something with you and check sugar if can.  Can carry faisal cracker, etc. Jeb Mckay This section provided for quick review of chart, separate sheet to patient  [address] Internal Lab or Procedure External Lab or Procedure   Diabetes Screening      HbgA1C   Annually Lab Results   Component Value Surinder Covered Once after 65 01/18/2018 Please get once after your 65th birthday    Pneumococcal 23 (Pneumovax)  Covered Once after 65 07/02/2018 Please get once after your 65th birthday    Hepatitis B for Moderate/High Risk No vaccine history found Medium/high r

## 2019-09-27 NOTE — TELEPHONE ENCOUNTER
Pharmacy advised of notes per Dr Luanne Sarmiento, agreed with change, no other questions at this time

## 2019-09-29 NOTE — PROGRESS NOTES
Urinalysis is okay. Hemoglobin A1c which is a 3-month average of sugars is worse than prior. Goal is 6.5 or less. Needs to check sugars. Pre-meal should be less than 90s to 110s. Post meals which also affected A1c should be less than 140s.   Increase

## 2019-10-01 ENCOUNTER — TELEPHONE (OUTPATIENT)
Dept: OTHER | Age: 69
End: 2019-10-01

## 2019-10-01 NOTE — TELEPHONE ENCOUNTER
Pt stated RX lidocaine 5 % External Ointment    Is Not covered by [employer]- pharmacy advised the lidocaine patch or gel

## 2019-10-01 NOTE — TELEPHONE ENCOUNTER
Spoke with patient. Does not want to pay either amount out of pocket, too expensive. States she will continue with the OTC gel for now.

## 2019-10-01 NOTE — TELEPHONE ENCOUNTER
Spoke with patient, states that she's been using OTC lidocaine ointment 4% and it helps but  Does not   lasts long, asking for prescription lidocaine patch or gel now. 5%.     Called [facility] and spoke with pharmacist, states that the ointment is covered under

## 2019-10-10 ENCOUNTER — HOSPITAL ENCOUNTER (OUTPATIENT)
Dept: NUCLEAR MEDICINE | Facility: HOSPITAL | Age: 69
Discharge: HOME | End: 2019-10-10
Attending: INTERNAL MEDICINE
Payer: MEDICARE

## 2019-10-10 ENCOUNTER — HOSPITAL ENCOUNTER (OUTPATIENT)
Dept: CV DIAGNOSTICS | Facility: HOSPITAL | Age: 69
Discharge: HOME | End: 2019-10-10
Attending: INTERNAL MEDICINE
Payer: MEDICARE

## 2019-10-10 ENCOUNTER — TELEPHONE (OUTPATIENT)
Dept: INTERNAL MEDICINE CLINIC | Facility: CLINIC | Age: 69
End: 2019-10-10

## 2019-10-10 DIAGNOSIS — R07.89 OTHER CHEST PAIN: ICD-10-CM

## 2019-10-10 PROCEDURE — 93017 CV STRESS TEST TRACING ONLY: CPT | Performed by: INTERNAL MEDICINE

## 2019-10-10 PROCEDURE — 93016 CV STRESS TEST SUPVJ ONLY: CPT | Performed by: INTERNAL MEDICINE

## 2019-10-10 PROCEDURE — 93018 CV STRESS TEST I&R ONLY: CPT | Performed by: INTERNAL MEDICINE

## 2019-10-10 PROCEDURE — 78452 HT MUSCLE IMAGE SPECT MULT: CPT | Performed by: INTERNAL MEDICINE

## 2019-10-10 RX ORDER — SIMVASTATIN 40 MG
1 TABLET ORAL NIGHTLY
COMMUNITY
Start: 2019-09-19 | End: 2019-10-16 | Stop reason: ALTCHOICE

## 2019-10-10 RX ORDER — FUROSEMIDE 20 MG/1
20 TABLET ORAL DAILY
COMMUNITY
Start: 2019-09-04

## 2019-10-10 RX ORDER — LANCING DEVICE
EACH MISCELLANEOUS
COMMUNITY
Start: 2019-03-21

## 2019-10-10 RX ORDER — AMLODIPINE BESYLATE 5 MG/1
5 TABLET ORAL DAILY
COMMUNITY
Start: 2019-02-25

## 2019-10-10 RX ORDER — ALBUTEROL SULFATE 90 UG/1
2 AEROSOL, METERED RESPIRATORY (INHALATION)
COMMUNITY
Start: 2019-06-13

## 2019-10-10 RX ORDER — BENAZEPRIL HYDROCHLORIDE 20 MG/1
20 TABLET ORAL 2 TIMES DAILY
COMMUNITY
Start: 2019-02-25

## 2019-10-10 RX ORDER — CLOTRIMAZOLE AND BETAMETHASONE DIPROPIONATE 10; .64 MG/G; MG/G
CREAM TOPICAL
COMMUNITY
Start: 2018-07-02

## 2019-10-10 RX ORDER — NITROGLYCERIN 0.4 MG/1
0.4 TABLET SUBLINGUAL
COMMUNITY
Start: 2019-09-26

## 2019-10-10 RX ORDER — BETAMETHASONE DIPROPIONATE 0.05 %
OINTMENT (GRAM) TOPICAL
COMMUNITY
Start: 2019-02-25

## 2019-10-10 RX ORDER — CLOBETASOL PROPIONATE 0.5 MG/G
OINTMENT TOPICAL
COMMUNITY
Start: 2017-07-17

## 2019-10-10 RX ORDER — BLOOD-GLUCOSE METER
1 EACH MISCELLANEOUS
COMMUNITY
Start: 2018-08-30

## 2019-10-10 RX ORDER — BUDESONIDE AND FORMOTEROL FUMARATE DIHYDRATE 160; 4.5 UG/1; UG/1
AEROSOL RESPIRATORY (INHALATION)
COMMUNITY
Start: 2019-06-12

## 2019-10-10 RX ORDER — 0.9 % SODIUM CHLORIDE 0.9 %
VIAL (ML) INJECTION
Status: COMPLETED
Start: 2019-10-10 | End: 2019-10-10

## 2019-10-10 RX ORDER — CHOLECALCIFEROL (VITAMIN D3) 1250 MCG
CAPSULE ORAL
COMMUNITY
Start: 2018-10-29

## 2019-10-10 RX ORDER — LIDOCAINE 50 MG/G
OINTMENT TOPICAL
COMMUNITY
Start: 2019-09-26

## 2019-10-10 RX ORDER — ALBUTEROL SULFATE 2.5 MG/3ML
2.5 SOLUTION RESPIRATORY (INHALATION)
COMMUNITY
Start: 2017-06-13

## 2019-10-10 RX ADMIN — 0.9 % SODIUM CHLORIDE 10 ML: 0.9 % VIAL (ML) INJECTION at 12:54:00

## 2019-10-10 SDOH — HEALTH STABILITY: MENTAL HEALTH: HOW OFTEN DO YOU HAVE A DRINK CONTAINING ALCOHOL?: NEVER

## 2019-10-10 NOTE — TELEPHONE ENCOUNTER
Dr. Shelley Robles (radiologist) called to report pt just had cardiac stress test done and concerned pt has:      myocardial perfusion images post pharmacologic stress demonstrate extensive moderate to large-sized, moderately reversible perfusion defects in the

## 2019-10-10 NOTE — TELEPHONE ENCOUNTER
Per Hollie Cavanaugh at [facility] heart patient can see Dr. Ruth Petersen tomorrow 10/11/19 at 8:00 am at [address]. In [place].  2ng flood Referral is authorized.

## 2019-10-10 NOTE — TELEPHONE ENCOUNTER
Advised patient of Dr Campos Pain  note. Patient verbalized understanding. Took information for appointment with Dr. Solares Rater tomorrow 10/11/19 8:00 am.  Referral was faxed over. If any chest pain, shortness of breath go to ER.

## 2019-10-10 NOTE — TELEPHONE ENCOUNTER
TEZTCIRIS on patient's phone.     Called and spoke with pt's daughter (HANNAH) and requested to have her mother call us back as soon as possible re stress test.

## 2019-10-10 NOTE — TELEPHONE ENCOUNTER
I tried calling pt but no answer,please  let pt know that her stress test is positive and she will need to see the cardiologist ASAP, I will place referral, if pt hs any cp at any time or with exertion she needs to go to ER and not wait for the cardiologis

## 2019-10-10 NOTE — TELEPHONE ENCOUNTER
Dr. Brooklyn Major, Radiology called in requesting to speak to Dr. Luis Kraft or nurses. CSS transferred to triage as Dr. Luis Kraft is out of the office. Please advise.

## 2019-10-11 ENCOUNTER — LAB ENCOUNTER (OUTPATIENT)
Dept: LAB | Facility: HOSPITAL | Age: 69
End: 2019-10-11
Attending: INTERNAL MEDICINE
Payer: MEDICARE

## 2019-10-11 ENCOUNTER — TELEPHONE (OUTPATIENT)
Dept: HEALTH INFORMATION MANAGEMENT | Facility: OTHER | Age: 69
End: 2019-10-11

## 2019-10-11 ENCOUNTER — V-VISIT (OUTPATIENT)
Dept: HEALTH INFORMATION MANAGEMENT | Facility: OTHER | Age: 69
End: 2019-10-11

## 2019-10-11 ENCOUNTER — HOSPITAL ENCOUNTER (OUTPATIENT)
Dept: GENERAL RADIOLOGY | Facility: HOSPITAL | Age: 69
Discharge: HOME | End: 2019-10-11
Attending: INTERNAL MEDICINE
Payer: MEDICARE

## 2019-10-11 ENCOUNTER — TELEPHONE (OUTPATIENT)
Dept: OTHER | Age: 69
End: 2019-10-11

## 2019-10-11 ENCOUNTER — OFFICE VISIT (OUTPATIENT)
Dept: CARDIOLOGY | Age: 69
End: 2019-10-11

## 2019-10-11 VITALS
HEART RATE: 75 BPM | SYSTOLIC BLOOD PRESSURE: 134 MMHG | DIASTOLIC BLOOD PRESSURE: 76 MMHG | OXYGEN SATURATION: 91 % | WEIGHT: 286 LBS | BODY MASS INDEX: 50.68 KG/M2 | HEIGHT: 63 IN

## 2019-10-11 DIAGNOSIS — I25.119 CORONARY ARTERY DISEASE WITH ANGINA PECTORIS, UNSPECIFIED VESSEL OR LESION TYPE, UNSPECIFIED WHETHER NATIVE OR TRANSPLANTED HEART (HCC): ICD-10-CM

## 2019-10-11 DIAGNOSIS — N28.9 RENAL INSUFFICIENCY: ICD-10-CM

## 2019-10-11 DIAGNOSIS — I10 BENIGN HYPERTENSION: ICD-10-CM

## 2019-10-11 DIAGNOSIS — R94.39 ABNORMAL NUCLEAR STRESS TEST: ICD-10-CM

## 2019-10-11 DIAGNOSIS — I25.700 CORONARY ARTERY DISEASE INVOLVING CORONARY BYPASS GRAFT OF NATIVE HEART WITH UNSTABLE ANGINA PECTORIS (CMD): Primary | ICD-10-CM

## 2019-10-11 DIAGNOSIS — E78.2 COMBINED HYPERLIPIDEMIA: ICD-10-CM

## 2019-10-11 DIAGNOSIS — E55.9 VITAMIN D DEFICIENCY: ICD-10-CM

## 2019-10-11 PROCEDURE — 85025 COMPLETE CBC W/AUTO DIFF WBC: CPT

## 2019-10-11 PROCEDURE — 71046 X-RAY EXAM CHEST 2 VIEWS: CPT | Performed by: INTERNAL MEDICINE

## 2019-10-11 PROCEDURE — 82306 VITAMIN D 25 HYDROXY: CPT

## 2019-10-11 PROCEDURE — 36415 COLL VENOUS BLD VENIPUNCTURE: CPT | Performed by: INTERNAL MEDICINE

## 2019-10-11 PROCEDURE — 80048 BASIC METABOLIC PNL TOTAL CA: CPT | Performed by: INTERNAL MEDICINE

## 2019-10-11 PROCEDURE — 86039 ANTINUCLEAR ANTIBODIES (ANA): CPT

## 2019-10-11 PROCEDURE — 85652 RBC SED RATE AUTOMATED: CPT | Performed by: INTERNAL MEDICINE

## 2019-10-11 PROCEDURE — 99204 OFFICE O/P NEW MOD 45 MIN: CPT | Performed by: INTERNAL MEDICINE

## 2019-10-11 PROCEDURE — 86038 ANTINUCLEAR ANTIBODIES: CPT

## 2019-10-11 PROCEDURE — 3078F DIAST BP <80 MM HG: CPT | Performed by: INTERNAL MEDICINE

## 2019-10-11 RX ORDER — CLOPIDOGREL 300 MG/1
300 TABLET, FILM COATED ORAL ONCE
Qty: 1 TABLET | Refills: 0 | Status: SHIPPED | OUTPATIENT
Start: 2019-10-13 | End: 2019-10-13

## 2019-10-11 ASSESSMENT — PATIENT HEALTH QUESTIONNAIRE - PHQ9
2. FEELING DOWN, DEPRESSED OR HOPELESS: NOT AT ALL
1. LITTLE INTEREST OR PLEASURE IN DOING THINGS: NOT AT ALL
SUM OF ALL RESPONSES TO PHQ9 QUESTIONS 1 AND 2: 0
SUM OF ALL RESPONSES TO PHQ9 QUESTIONS 1 AND 2: 0

## 2019-10-11 NOTE — TELEPHONE ENCOUNTER
Melissa Coombs calling and requesting copy of CABG report from [facility]  that was done on 2016, states that patient is schedule on Monday 10/14 for L heart cath with PCI. Thi nurse tried to check all the notes and media but no copy of the Surgical Report.   Gaylin Litten

## 2019-10-11 NOTE — PLAN OF CARE
10/11/19 13:00 Order received from Dr. Palomo Arnold for NS hydration at 75cc/hr for 3 hrs before procedure.

## 2019-10-14 ENCOUNTER — MED REC SCAN ONLY (OUTPATIENT)
Dept: INTERNAL MEDICINE CLINIC | Facility: CLINIC | Age: 69
End: 2019-10-14

## 2019-10-14 ENCOUNTER — HOSPITAL ENCOUNTER (OUTPATIENT)
Dept: INTERVENTIONAL RADIOLOGY/VASCULAR | Facility: HOSPITAL | Age: 69
Setting detail: OBSERVATION
Discharge: HOME | End: 2019-10-15
Attending: INTERNAL MEDICINE | Admitting: INTERNAL MEDICINE
Payer: MEDICARE

## 2019-10-14 ENCOUNTER — CLINICAL ABSTRACT (OUTPATIENT)
Dept: CARDIOLOGY | Age: 69
End: 2019-10-14

## 2019-10-14 DIAGNOSIS — I25.10 CAD (CORONARY ARTERY DISEASE): ICD-10-CM

## 2019-10-14 PROBLEM — R94.39 ABNORMAL NUCLEAR STRESS TEST: Status: ACTIVE | Noted: 2019-10-11

## 2019-10-14 LAB — GLUCOSE SERPL-MCNC: 159 MG/DL (ref 65–99)

## 2019-10-14 PROCEDURE — 93455 CORONARY ART/GRFT ANGIO S&I: CPT

## 2019-10-14 PROCEDURE — 93459 L HRT ART/GRFT ANGIO: CPT | Performed by: INTERNAL MEDICINE

## 2019-10-14 PROCEDURE — 36415 COLL VENOUS BLD VENIPUNCTURE: CPT

## 2019-10-14 PROCEDURE — B2131ZZ FLUOROSCOPY OF MULTIPLE CORONARY ARTERY BYPASS GRAFTS USING LOW OSMOLAR CONTRAST: ICD-10-PCS | Performed by: INTERNAL MEDICINE

## 2019-10-14 PROCEDURE — 93005 ELECTROCARDIOGRAM TRACING: CPT

## 2019-10-14 PROCEDURE — 93010 ELECTROCARDIOGRAM REPORT: CPT | Performed by: INTERNAL MEDICINE

## 2019-10-14 PROCEDURE — 99152 MOD SED SAME PHYS/QHP 5/>YRS: CPT

## 2019-10-14 PROCEDURE — 92937 PRQ TRLUML REVSC CAB GRF 1: CPT | Performed by: INTERNAL MEDICINE

## 2019-10-14 PROCEDURE — 99153 MOD SED SAME PHYS/QHP EA: CPT

## 2019-10-14 PROCEDURE — 82962 GLUCOSE BLOOD TEST: CPT

## 2019-10-14 PROCEDURE — C9460 INJECTION, CANGRELOR: HCPCS

## 2019-10-14 PROCEDURE — 99152 MOD SED SAME PHYS/QHP 5/>YRS: CPT | Performed by: INTERNAL MEDICINE

## 2019-10-14 PROCEDURE — 93454 CORONARY ARTERY ANGIO S&I: CPT

## 2019-10-14 PROCEDURE — 027034Z DILATION OF CORONARY ARTERY, ONE ARTERY WITH DRUG-ELUTING INTRALUMINAL DEVICE, PERCUTANEOUS APPROACH: ICD-10-PCS | Performed by: INTERNAL MEDICINE

## 2019-10-14 PROCEDURE — B2181ZZ FLUOROSCOPY OF LEFT INTERNAL MAMMARY BYPASS GRAFT USING LOW OSMOLAR CONTRAST: ICD-10-PCS | Performed by: INTERNAL MEDICINE

## 2019-10-14 RX ORDER — AMLODIPINE BESYLATE 5 MG/1
5 TABLET ORAL DAILY
Status: DISCONTINUED | OUTPATIENT
Start: 2019-10-15 | End: 2019-10-15

## 2019-10-14 RX ORDER — SODIUM CHLORIDE 9 MG/ML
INJECTION, SOLUTION INTRAVENOUS CONTINUOUS
Status: ACTIVE | OUTPATIENT
Start: 2019-10-14 | End: 2019-10-14

## 2019-10-14 RX ORDER — ATORVASTATIN CALCIUM 20 MG/1
20 TABLET, FILM COATED ORAL NIGHTLY
Status: DISCONTINUED | OUTPATIENT
Start: 2019-10-14 | End: 2019-10-15

## 2019-10-14 RX ORDER — ASPIRIN 81 MG/1
81 TABLET ORAL DAILY
Status: DISCONTINUED | OUTPATIENT
Start: 2019-10-15 | End: 2019-10-15

## 2019-10-14 RX ORDER — ONDANSETRON 4 MG/1
4 TABLET, FILM COATED ORAL EVERY 8 HOURS PRN
Status: DISCONTINUED | OUTPATIENT
Start: 2019-10-14 | End: 2019-10-15

## 2019-10-14 RX ORDER — HYDRALAZINE HYDROCHLORIDE 20 MG/ML
10 INJECTION INTRAMUSCULAR; INTRAVENOUS EVERY 6 HOURS PRN
Status: DISCONTINUED | OUTPATIENT
Start: 2019-10-14 | End: 2019-10-15

## 2019-10-14 RX ORDER — MAGNESIUM HYDROXIDE/ALUMINUM HYDROXICE/SIMETHICONE 120; 1200; 1200 MG/30ML; MG/30ML; MG/30ML
30 SUSPENSION ORAL 4 TIMES DAILY PRN
Status: DISCONTINUED | OUTPATIENT
Start: 2019-10-14 | End: 2019-10-15

## 2019-10-14 RX ORDER — HEPARIN SODIUM 1000 [USP'U]/ML
INJECTION, SOLUTION INTRAVENOUS; SUBCUTANEOUS
Status: DISCONTINUED
Start: 2019-10-14 | End: 2019-10-14 | Stop reason: WASHOUT

## 2019-10-14 RX ORDER — ACETAMINOPHEN 325 MG/1
650 TABLET ORAL EVERY 6 HOURS PRN
Status: DISCONTINUED | OUTPATIENT
Start: 2019-10-14 | End: 2019-10-15

## 2019-10-14 RX ORDER — ALBUTEROL SULFATE 2.5 MG/3ML
2.5 SOLUTION RESPIRATORY (INHALATION) EVERY 6 HOURS PRN
Status: DISCONTINUED | OUTPATIENT
Start: 2019-10-14 | End: 2019-10-15

## 2019-10-14 RX ORDER — CLOPIDOGREL BISULFATE 75 MG/1
75 TABLET ORAL DAILY
Status: DISCONTINUED | OUTPATIENT
Start: 2019-10-15 | End: 2019-10-15

## 2019-10-14 RX ORDER — LISINOPRIL 20 MG/1
20 TABLET ORAL DAILY
Status: DISCONTINUED | OUTPATIENT
Start: 2019-10-15 | End: 2019-10-15

## 2019-10-14 RX ORDER — ONDANSETRON 2 MG/ML
INJECTION INTRAMUSCULAR; INTRAVENOUS
Status: COMPLETED
Start: 2019-10-14 | End: 2019-10-14

## 2019-10-14 RX ORDER — MIDAZOLAM HYDROCHLORIDE 1 MG/ML
INJECTION INTRAMUSCULAR; INTRAVENOUS
Status: COMPLETED
Start: 2019-10-14 | End: 2019-10-14

## 2019-10-14 RX ORDER — SODIUM CHLORIDE 9 MG/ML
75 INJECTION, SOLUTION INTRAVENOUS CONTINUOUS
Status: DISCONTINUED | OUTPATIENT
Start: 2019-10-14 | End: 2019-10-15

## 2019-10-14 RX ORDER — DEXTROSE MONOHYDRATE 25 G/50ML
50 INJECTION, SOLUTION INTRAVENOUS AS NEEDED
Status: DISCONTINUED | OUTPATIENT
Start: 2019-10-14 | End: 2019-10-15

## 2019-10-14 RX ORDER — FUROSEMIDE 20 MG/1
20 TABLET ORAL DAILY
Status: DISCONTINUED | OUTPATIENT
Start: 2019-10-15 | End: 2019-10-15

## 2019-10-14 RX ORDER — LIDOCAINE HYDROCHLORIDE 20 MG/ML
INJECTION, SOLUTION EPIDURAL; INFILTRATION; INTRACAUDAL; PERINEURAL
Status: COMPLETED
Start: 2019-10-14 | End: 2019-10-14

## 2019-10-14 RX ORDER — SODIUM CHLORIDE 9 MG/ML
INJECTION, SOLUTION INTRAVENOUS
Status: COMPLETED
Start: 2019-10-14 | End: 2019-10-14

## 2019-10-14 RX ORDER — CHLORHEXIDINE GLUCONATE 4 G/100ML
30 SOLUTION TOPICAL
Status: ACTIVE | OUTPATIENT
Start: 2019-10-14 | End: 2019-10-14

## 2019-10-14 RX ADMIN — SODIUM CHLORIDE: 9 INJECTION, SOLUTION INTRAVENOUS at 17:02:00

## 2019-10-14 RX ADMIN — SODIUM CHLORIDE 75 ML/HR: 9 INJECTION, SOLUTION INTRAVENOUS at 08:45:00

## 2019-10-14 NOTE — PROGRESS NOTES
Procedure hand off report given to Celina Anand RN. Procedural access site is dry and intact with no signs and symptoms of bleeding and hematoma. Dr Kathy Pollock came to see pt  at bedside post procedure. Groin check done with Celina Anand RN, upon transfer to floor.  Pt is

## 2019-10-14 NOTE — PROCEDURES
[facility]    [facility] Cardiac Cath Procedure Note  Jaren Norton Patient Status:  Outpatient in a Bed    1950 MRN [id number]   Location [facility] Attending Gian Mercado, [place] [facility] Day # 0 PCP Tristan Mccord scan        Recommendations:  DAPT - aspirin and Katie today      Description of Procedure:   After written informed consent was obtained from the patient, patient was brought to the cardiac catheterization laboratory.   Patient was prepped and d

## 2019-10-15 VITALS
BODY MASS INDEX: 50.32 KG/M2 | HEART RATE: 98 BPM | TEMPERATURE: 98 F | DIASTOLIC BLOOD PRESSURE: 70 MMHG | HEIGHT: 63 IN | OXYGEN SATURATION: 91 % | SYSTOLIC BLOOD PRESSURE: 139 MMHG | RESPIRATION RATE: 15 BRPM | WEIGHT: 284 LBS

## 2019-10-15 PROCEDURE — 80048 BASIC METABOLIC PNL TOTAL CA: CPT | Performed by: INTERNAL MEDICINE

## 2019-10-15 PROCEDURE — 99217 OBSERVATION CARE DISCHARGE: CPT | Performed by: INTERNAL MEDICINE

## 2019-10-15 PROCEDURE — 82962 GLUCOSE BLOOD TEST: CPT

## 2019-10-15 PROCEDURE — 83036 HEMOGLOBIN GLYCOSYLATED A1C: CPT | Performed by: NURSE PRACTITIONER

## 2019-10-15 PROCEDURE — 85027 COMPLETE CBC AUTOMATED: CPT | Performed by: INTERNAL MEDICINE

## 2019-10-15 RX ORDER — ATORVASTATIN CALCIUM 40 MG/1
40 TABLET, FILM COATED ORAL NIGHTLY
Qty: 90 TABLET | Refills: 0 | Status: SHIPPED | OUTPATIENT
Start: 2019-10-15 | End: 2020-04-14

## 2019-10-15 RX ORDER — CLOPIDOGREL BISULFATE 75 MG/1
75 TABLET ORAL DAILY
Qty: 90 TABLET | Refills: 0 | Status: SHIPPED | OUTPATIENT
Start: 2019-10-16 | End: 2020-10-01

## 2019-10-15 RX ORDER — ATORVASTATIN CALCIUM 40 MG/1
40 TABLET, FILM COATED ORAL NIGHTLY
Status: DISCONTINUED | OUTPATIENT
Start: 2019-10-15 | End: 2019-10-15

## 2019-10-15 RX ADMIN — LISINOPRIL 20 MG: 20 TABLET ORAL at 09:30:00

## 2019-10-15 RX ADMIN — ASPIRIN 81 MG: 81 TABLET ORAL at 09:30:00

## 2019-10-15 RX ADMIN — CLOPIDOGREL BISULFATE 75 MG: 75 TABLET ORAL at 09:30:00

## 2019-10-15 RX ADMIN — AMLODIPINE BESYLATE 5 MG: 5 TABLET ORAL at 09:30:00

## 2019-10-15 RX ADMIN — FUROSEMIDE 20 MG: 20 TABLET ORAL at 09:30:00

## 2019-10-15 NOTE — DIETARY NOTE
NUTRITION EDUCATION NOTE    Patient educated regarding diabetes diet basics. Discussed carbohydrate foods, portion sizes, and food label reading. Handout given and initial meal plan provided. Encouraged to attend outpatient diabetes education.  Questions a

## 2019-10-15 NOTE — PROGRESS NOTES
[facility]  [facility] Cardiology Progress Note    Katya Latham Patient Status:  Observation    1950 MRN [id number]   Location [address] [place] [address] Attending Luke Krishnamurthy, [address] [facility] [address] Day # 0 PCP Eileen Villegas.  Lisa Sykes MD     78 yo female admitted p hours) at 10/15/2019 8492  Last data filed at 10/15/2019 0400  Gross per 24 hour   Intake 958.33 ml   Output 950 ml   Net 8.33 ml       Physical Exam:    General: NAD  HEENT: Normocephalic, anicteric sclera, neck supple.   Neck: No JVD, carotids 2+, no brui

## 2019-10-15 NOTE — CARDIAC REHAB
Cardiac Rehab Phase I    Activity:  Distance Per self  Assistance needed Cane  Patient tolerated activity Well per patient. Education:  Handouts provided and reviewed: [address] Diet: Healthy Cardiac diet reviewed.     Disease

## 2019-10-16 ENCOUNTER — TELEPHONE (OUTPATIENT)
Dept: CARDIOLOGY | Age: 69
End: 2019-10-16

## 2019-10-16 ENCOUNTER — TELEPHONE (OUTPATIENT)
Dept: CARDIOLOGY UNIT | Facility: HOSPITAL | Age: 69
End: 2019-10-16

## 2019-10-16 ENCOUNTER — PATIENT OUTREACH (OUTPATIENT)
Dept: CASE MANAGEMENT | Age: 69
End: 2019-10-16

## 2019-10-16 RX ORDER — ATORVASTATIN CALCIUM 40 MG/1
40 TABLET, FILM COATED ORAL AT BEDTIME
Qty: 90 TABLET | Refills: 1 | Status: SHIPPED | OUTPATIENT
Start: 2019-10-16 | End: 2019-10-16 | Stop reason: SDUPTHER

## 2019-10-16 RX ORDER — CLOPIDOGREL BISULFATE 75 MG/1
75 TABLET ORAL DAILY
Qty: 90 TABLET | Refills: 3 | Status: SHIPPED | OUTPATIENT
Start: 2019-10-16 | End: 2019-10-16 | Stop reason: SDUPTHER

## 2019-10-16 NOTE — PROGRESS NOTES
[facility] [phone] for post hospital follow up, [facility] contact information provided.

## 2019-10-17 ENCOUNTER — MED REC SCAN ONLY (OUTPATIENT)
Dept: INTERNAL MEDICINE CLINIC | Facility: CLINIC | Age: 69
End: 2019-10-17

## 2019-10-17 DIAGNOSIS — E55.9 VITAMIN D DEFICIENCY: Primary | ICD-10-CM

## 2019-10-17 RX ORDER — ATORVASTATIN CALCIUM 40 MG/1
40 TABLET, FILM COATED ORAL AT BEDTIME
Qty: 90 TABLET | Refills: 1 | Status: SHIPPED | OUTPATIENT
Start: 2019-10-17

## 2019-10-17 RX ORDER — CLOPIDOGREL BISULFATE 75 MG/1
75 TABLET ORAL DAILY
Qty: 90 TABLET | Refills: 3 | Status: SHIPPED | OUTPATIENT
Start: 2019-10-17

## 2019-10-18 ENCOUNTER — HOSPITAL ENCOUNTER (OUTPATIENT)
Dept: ULTRASOUND IMAGING | Age: 69
Discharge: HOME | End: 2019-10-18
Attending: INTERNAL MEDICINE
Payer: MEDICARE

## 2019-10-18 DIAGNOSIS — N28.9 RENAL INSUFFICIENCY: ICD-10-CM

## 2019-10-18 PROBLEM — N28.89 RENAL MASS, LEFT: Status: ACTIVE | Noted: 2019-10-18

## 2019-10-18 PROCEDURE — 76770 US EXAM ABDO BACK WALL COMP: CPT | Performed by: INTERNAL MEDICINE

## 2019-10-21 RX ORDER — BENAZEPRIL HYDROCHLORIDE 20 MG/1
20 TABLET ORAL 2 TIMES DAILY
Qty: 180 TABLET | Refills: 1 | Status: SHIPPED | OUTPATIENT
Start: 2019-10-21 | End: 2020-04-23

## 2019-10-21 NOTE — TELEPHONE ENCOUNTER
Current Outpatient Medications:   • •  Benazepril HCl 20 MG Oral Tab, Take 1 tablet (20 mg total) by mouth 2 (two) times daily. , Disp: 180 tablet, Rfl: 1

## 2019-10-22 NOTE — TELEPHONE ENCOUNTER
Refill passed per [facility] protocol.   Hypertensive Medications  Protocol Criteria:  · Appointment scheduled in the past 6 months or in the next 3 months  · BMP or CMP in the past 12 months  · Creatinine result < 2  Recent Outpatient Visits

## 2019-10-25 ENCOUNTER — OFFICE VISIT (OUTPATIENT)
Dept: CARDIOLOGY CLINIC | Facility: CLINIC | Age: 69
End: 2019-10-25
Payer: MEDICARE

## 2019-10-25 VITALS
SYSTOLIC BLOOD PRESSURE: 116 MMHG | DIASTOLIC BLOOD PRESSURE: 73 MMHG | BODY MASS INDEX: 50.5 KG/M2 | HEART RATE: 116 BPM | WEIGHT: 285 LBS | HEIGHT: 63 IN | RESPIRATION RATE: 20 BRPM

## 2019-10-25 DIAGNOSIS — I25.810 CORONARY ARTERY DISEASE INVOLVING CORONARY BYPASS GRAFT OF NATIVE HEART WITHOUT ANGINA PECTORIS: Primary | ICD-10-CM

## 2019-10-25 PROCEDURE — 99204 OFFICE O/P NEW MOD 45 MIN: CPT | Performed by: NURSE PRACTITIONER

## 2019-10-25 NOTE — PROGRESS NOTES
Susy Kim is a 79year old female. Patient presents with: Follow - Up: Groin check, stints place last monday 10/14/2019. Pt states feeling ok and no pain    HPI:   Patient comes in today for follow-up. She sees Dr. Sanya Oseguera over [facility].   Sh Aero Soln, Inhale 2 puffs into the lungs every 4 (four) hours as needed for Wheezing., Disp: 1 Inhaler, Rfl: 1  SYMBICORT 160-4.5 MCG/ACT Inhalation Aerosol, TAKE 1 PUFF BY MOUTH TWICE A DAY, Disp: 3 Inhaler, Rfl: 1  [facility] Smoking status: Former Smoker        Packs/day: 1.00      Smokeless tobacco: Never Used    Alcohol use: No      Alcohol/week: 0.0 standard drinks    Drug use: No       REVIEW OF SYSTEMS:   GENERAL HEALTH: feels well otherwise  SKIN: denies any unusual skin

## 2019-10-30 ENCOUNTER — TELEPHONE (OUTPATIENT)
Dept: CARDIOLOGY CLINIC | Facility: CLINIC | Age: 69
End: 2019-10-30

## 2019-10-30 NOTE — TELEPHONE ENCOUNTER
Referral Information:  Order Date: Oct 25, 2019       Epic Order #: [id number]   Referral Type: OP REFERRAL TO [facility] CARDIAC REHAB Dx: Coronary artery disease involving coronary bypass graft of native heart without angina pectoris (I25.810)         Number of

## 2019-10-30 NOTE — TELEPHONE ENCOUNTER
Per notes from managed care:  Note    No auth required per [facility] Prior Authorization List        No auth required for cardiac rehab. Called and informed the patient. Provided phone number to schedule with cardiac rehab.

## 2019-11-06 RX ORDER — INSULIN GLARGINE 100 [IU]/ML
INJECTION, SOLUTION SUBCUTANEOUS
Qty: 5 PEN | Refills: 1 | Status: SHIPPED | OUTPATIENT
Start: 2019-11-06 | End: 2019-12-05

## 2019-11-07 ENCOUNTER — CARDPULM VISIT (OUTPATIENT)
Dept: CARDIAC REHAB | Facility: HOSPITAL | Age: 69
End: 2019-11-07
Attending: INTERNAL MEDICINE
Payer: MEDICARE

## 2019-11-13 ENCOUNTER — APPOINTMENT (OUTPATIENT)
Dept: CARDIAC REHAB | Facility: HOSPITAL | Age: 69
End: 2019-11-13
Attending: INTERNAL MEDICINE
Payer: MEDICARE

## 2019-11-15 ENCOUNTER — APPOINTMENT (OUTPATIENT)
Dept: CARDIAC REHAB | Facility: HOSPITAL | Age: 69
End: 2019-11-15
Attending: INTERNAL MEDICINE
Payer: MEDICARE

## 2019-11-18 ENCOUNTER — APPOINTMENT (OUTPATIENT)
Dept: CARDIAC REHAB | Facility: HOSPITAL | Age: 69
End: 2019-11-18
Attending: INTERNAL MEDICINE
Payer: MEDICARE

## 2019-11-19 ENCOUNTER — TELEPHONE (OUTPATIENT)
Dept: INTERNAL MEDICINE CLINIC | Facility: CLINIC | Age: 69
End: 2019-11-19

## 2019-11-19 ENCOUNTER — MED REC SCAN ONLY (OUTPATIENT)
Dept: INTERNAL MEDICINE CLINIC | Facility: CLINIC | Age: 69
End: 2019-11-19

## 2019-11-19 NOTE — TELEPHONE ENCOUNTER
Patient [facility], and it needs to be completed and returned by 12/1/2019

## 2019-11-20 ENCOUNTER — APPOINTMENT (OUTPATIENT)
Dept: CARDIAC REHAB | Facility: HOSPITAL | Age: 69
End: 2019-11-20
Attending: INTERNAL MEDICINE
Payer: MEDICARE

## 2019-11-22 ENCOUNTER — APPOINTMENT (OUTPATIENT)
Dept: CARDIAC REHAB | Facility: HOSPITAL | Age: 69
End: 2019-11-22
Attending: INTERNAL MEDICINE
Payer: MEDICARE

## 2019-11-22 ENCOUNTER — APPOINTMENT (OUTPATIENT)
Dept: CARDIOLOGY | Age: 69
End: 2019-11-22

## 2019-11-25 ENCOUNTER — APPOINTMENT (OUTPATIENT)
Dept: CARDIAC REHAB | Facility: HOSPITAL | Age: 69
End: 2019-11-25
Attending: INTERNAL MEDICINE
Payer: MEDICARE

## 2019-11-27 ENCOUNTER — APPOINTMENT (OUTPATIENT)
Dept: CARDIAC REHAB | Facility: HOSPITAL | Age: 69
End: 2019-11-27
Attending: INTERNAL MEDICINE
Payer: MEDICARE

## 2019-11-29 ENCOUNTER — APPOINTMENT (OUTPATIENT)
Dept: CARDIAC REHAB | Facility: HOSPITAL | Age: 69
End: 2019-11-29
Attending: INTERNAL MEDICINE
Payer: MEDICARE

## 2019-12-02 ENCOUNTER — CARDPULM VISIT (OUTPATIENT)
Dept: CARDIAC REHAB | Facility: HOSPITAL | Age: 69
End: 2019-12-02
Attending: INTERNAL MEDICINE
Payer: MEDICARE

## 2019-12-02 PROCEDURE — 93798 PHYS/QHP OP CAR RHAB W/ECG: CPT

## 2019-12-02 PROCEDURE — 82962 GLUCOSE BLOOD TEST: CPT

## 2019-12-02 RX ORDER — CHOLECALCIFEROL (VITAMIN D3) 1250 MCG
CAPSULE ORAL
Qty: 12 CAPSULE | Refills: 1 | Status: SHIPPED | OUTPATIENT
Start: 2019-12-02 | End: 2019-12-03

## 2019-12-03 ENCOUNTER — OFFICE VISIT (OUTPATIENT)
Dept: CARDIOLOGY CLINIC | Facility: CLINIC | Age: 69
End: 2019-12-03
Payer: MEDICARE

## 2019-12-03 VITALS
BODY MASS INDEX: 49.55 KG/M2 | RESPIRATION RATE: 18 BRPM | HEIGHT: 63 IN | SYSTOLIC BLOOD PRESSURE: 117 MMHG | HEART RATE: 82 BPM | WEIGHT: 279.63 LBS | DIASTOLIC BLOOD PRESSURE: 78 MMHG

## 2019-12-03 DIAGNOSIS — I25.810 CORONARY ARTERY DISEASE INVOLVING CORONARY BYPASS GRAFT OF NATIVE HEART WITHOUT ANGINA PECTORIS: Primary | ICD-10-CM

## 2019-12-03 PROBLEM — Z71.85 VACCINE COUNSELING: Status: ACTIVE | Noted: 2019-12-03

## 2019-12-03 PROBLEM — E11.65 UNCONTROLLED TYPE 2 DIABETES MELLITUS WITH HYPERGLYCEMIA (HCC): Status: ACTIVE | Noted: 2017-06-14

## 2019-12-03 PROCEDURE — 99214 OFFICE O/P EST MOD 30 MIN: CPT | Performed by: NURSE PRACTITIONER

## 2019-12-03 RX ORDER — ACETAMINOPHEN 500 MG
500 TABLET ORAL EVERY 6 HOURS PRN
COMMUNITY

## 2019-12-03 NOTE — TELEPHONE ENCOUNTER
Review pended refill request as it does not fall under a protocol.     Last Rx: 6/10/19  LOV: 9/26/19  No vit D level seen      Requested Prescriptions   Pending Prescriptions Disp Refills   • VITAMIN D3 1.25 MG (63730 UT) Oral Cap [Pharmacy Med Name: [employer]

## 2019-12-03 NOTE — PROGRESS NOTES
HPI:    Patient ID: Alejandro Beckham is a 79year old female.   /62 (BP Location: Left arm, Patient Position: Sitting, Cuff Size: large)   Pulse 79   Temp 97.8 °F (36.6 °C) (Oral)   Resp 19   Ht 5' 3\" (1.6 m)   Wt 281 lb (127.5 kg)   SpO2 94%    L/ -   HDL 40 - 59 mg/dL - - - -   LDL <100 mg/dL - - - -   Microalbumin Urine mg/dL - - - -   Microalb/Creatinine Calc <=30.0 ug/mg - - - -   HGBA1C <5.7 % - - - -   LISINOPRIL 20 MG OR TABS - - - - -   BENAZEPRIL HCL 20 MG OR TABS - - - - -   Weight (enc vi (40 mg total) by mouth nightly. 90 tablet 0   • Clopidogrel Bisulfate 75 MG Oral Tab Take 1 tablet (75 mg total) by mouth daily.  90 tablet 0   • insulin glargine (LANTUS SOLOSTAR) 100 UNIT/ML Subcutaneous Solution Pen-injector INJECT 52 UNITS SUBCUTANEOUSL (2.5 mg total) by nebulization every 4 (four) hours as needed for Wheezing. 30 ampule 3   • PRODIGY NO CODING BLOOD GLUC In Vitro Strip CHECK BLOOD GLUCOSE EVERY 12 HOURS 50 each 6   • clotrimazole-betamethasone 1-0.05 % External Cream Apply q12hrs.  For 2 pedis pulses are 2+ on the right side and 2+ on the left side. Posterior tibial pulses are 2+ on the right side and 2+ on the left side. Pulmonary/Chest: Effort normal and breath sounds normal. No accessory muscle usage.  No apnea, no tachypnea and Use other seasonings. Combined hyperlipidemia Checked 9-19. Asthma with copd (chronic obstructive pulmonary disease) (hcc) Stable. Uncontrolled type 2 diabetes mellitus with hyperglycemia (hcc) Better.  Careful with diet and excercise at least 3

## 2019-12-03 NOTE — PROGRESS NOTES
Reji Choi is a 79year old female. Patient presents with: Follow - Up: 1 month  Shortness Of Breath: short distance    HPI:   Patient comes in today for follow-up. She sees Dr. Lotus Smith. She has a history of bypass diabetes.   She had a recent stress test Inhalation Aero Soln Inhale 2 puffs into the lungs every 4 (four) hours as needed for Wheezing.  1 Inhaler 1   • SYMBICORT 160-4.5 MCG/ACT Inhalation Aerosol TAKE 1 PUFF BY MOUTH TWICE A DAY 3 Inhaler 1   • PHARMACIST CHOICE ALCOHOL 70 % External Pads Check Tobacco Use      Smoking status: Former Smoker        Packs/day: 1.00      Smokeless tobacco: Never Used    Alcohol use: No      Alcohol/week: 0.0 standard drinks    Drug use: No       REVIEW OF SYSTEMS:   GENERAL HEALTH: feels well otherwise  SKIN: denies

## 2019-12-04 ENCOUNTER — CARDPULM VISIT (OUTPATIENT)
Dept: CARDIAC REHAB | Facility: HOSPITAL | Age: 69
End: 2019-12-04
Attending: INTERNAL MEDICINE
Payer: MEDICARE

## 2019-12-04 PROCEDURE — 82962 GLUCOSE BLOOD TEST: CPT

## 2019-12-04 PROCEDURE — 93798 PHYS/QHP OP CAR RHAB W/ECG: CPT

## 2019-12-05 ENCOUNTER — OFFICE VISIT (OUTPATIENT)
Dept: INTERNAL MEDICINE CLINIC | Facility: CLINIC | Age: 69
End: 2019-12-05
Payer: MEDICARE

## 2019-12-05 ENCOUNTER — TELEPHONE (OUTPATIENT)
Dept: INTERNAL MEDICINE CLINIC | Facility: CLINIC | Age: 69
End: 2019-12-05

## 2019-12-05 VITALS
WEIGHT: 281 LBS | OXYGEN SATURATION: 94 % | BODY MASS INDEX: 49.79 KG/M2 | HEIGHT: 63 IN | RESPIRATION RATE: 19 BRPM | TEMPERATURE: 98 F | SYSTOLIC BLOOD PRESSURE: 128 MMHG | DIASTOLIC BLOOD PRESSURE: 62 MMHG | HEART RATE: 79 BPM

## 2019-12-05 DIAGNOSIS — I10 HTN (HYPERTENSION), BENIGN: Primary | ICD-10-CM

## 2019-12-05 DIAGNOSIS — R01.1 HEART MURMUR: ICD-10-CM

## 2019-12-05 DIAGNOSIS — M25.561 ACUTE PAIN OF RIGHT KNEE: ICD-10-CM

## 2019-12-05 DIAGNOSIS — J44.9 ASTHMA WITH COPD (CHRONIC OBSTRUCTIVE PULMONARY DISEASE) (HCC): Chronic | ICD-10-CM

## 2019-12-05 DIAGNOSIS — E11.65 UNCONTROLLED TYPE 2 DIABETES MELLITUS WITH HYPERGLYCEMIA (HCC): ICD-10-CM

## 2019-12-05 DIAGNOSIS — Z71.85 VACCINE COUNSELING: ICD-10-CM

## 2019-12-05 DIAGNOSIS — E78.2 COMBINED HYPERLIPIDEMIA: ICD-10-CM

## 2019-12-05 PROCEDURE — 99214 OFFICE O/P EST MOD 30 MIN: CPT | Performed by: INTERNAL MEDICINE

## 2019-12-05 NOTE — TELEPHONE ENCOUNTER
Called patient 2x, no answer. Left VM to call us back to see if she would be interested in coming in to see Dr Malena Piedra for cortisone shot on rt knee. We can see her sooner than that, she just needs to call us back.     (confirmed and ok w/ Dr Malena Piedra)  Appt w/

## 2019-12-05 NOTE — PATIENT INSTRUCTIONS
ASSESSMENT/PLAN:   Htn (hypertension), benign  (primary encounter diagnosis) Good control. Careful with diet and excercise at least 30 minutes 3-4 times a week. Check blood pressures at different times on different days.  Can purchase own blood press

## 2019-12-06 ENCOUNTER — OFFICE VISIT (OUTPATIENT)
Dept: INTERNAL MEDICINE CLINIC | Facility: CLINIC | Age: 69
End: 2019-12-06
Payer: MEDICARE

## 2019-12-06 VITALS
RESPIRATION RATE: 17 BRPM | OXYGEN SATURATION: 98 % | HEIGHT: 63 IN | DIASTOLIC BLOOD PRESSURE: 94 MMHG | BODY MASS INDEX: 49.79 KG/M2 | HEART RATE: 73 BPM | SYSTOLIC BLOOD PRESSURE: 172 MMHG | WEIGHT: 281 LBS | TEMPERATURE: 97 F

## 2019-12-06 DIAGNOSIS — M17.11 PRIMARY OSTEOARTHRITIS OF RIGHT KNEE: Primary | ICD-10-CM

## 2019-12-06 PROCEDURE — 99214 OFFICE O/P EST MOD 30 MIN: CPT | Performed by: INTERNAL MEDICINE

## 2019-12-06 NOTE — PROGRESS NOTES
HPI:    Patient ID: Jaren Norton is a 79year old female. HPI  Patient comes in today for steroid knee injection she has long-term problem with arthritis to the knees she has been having pain to the right knee medication not relieving it much.   Patient dry skin. 60 g 3   • METFORMIN HCL 1000 MG Oral Tab TAKE 1 TABLET BY MOUTH TWICE A DAY WITH FOOD 180 tablet 1   • furosemide 20 MG Oral Tab Take 1 tablet (20 mg total) by mouth once daily.  90 tablet 1   • Albuterol Sulfate HFA (PROAIR HFA) 108 (90 Base) MC appears well-developed and well-nourished. HENT:   Head: Normocephalic and atraumatic. Eyes: Pupils are equal, round, and reactive to light. Conjunctivae are normal. No scleral icterus. Neck: Normal range of motion. Neck supple.    Cardiovascular: Nor

## 2019-12-08 ENCOUNTER — TELEPHONE (OUTPATIENT)
Dept: INTERNAL MEDICINE CLINIC | Facility: CLINIC | Age: 69
End: 2019-12-08

## 2019-12-09 ENCOUNTER — CARDPULM VISIT (OUTPATIENT)
Dept: CARDIAC REHAB | Facility: HOSPITAL | Age: 69
End: 2019-12-09
Attending: INTERNAL MEDICINE
Payer: MEDICARE

## 2019-12-09 PROCEDURE — 93798 PHYS/QHP OP CAR RHAB W/ECG: CPT

## 2019-12-09 NOTE — TELEPHONE ENCOUNTER
Pt states that she has not had an eye exam recently, but will schedule one at  the beginning of the year.

## 2019-12-10 RX ORDER — BUDESONIDE AND FORMOTEROL FUMARATE DIHYDRATE 160; 4.5 UG/1; UG/1
AEROSOL RESPIRATORY (INHALATION)
Qty: 30.6 INHALER | Refills: 1 | Status: SHIPPED | OUTPATIENT
Start: 2019-12-10 | End: 2020-06-19

## 2019-12-10 NOTE — TELEPHONE ENCOUNTER
Refill passed per [facility], [facility] protocol.     Refill Protocol Appointment Criteria  · Appointment scheduled in the past 6 months or in the next 3 months  Recent Outpatient Visits            4 days ago Primary osteoarthritis of right knee    [facility] In 3 weeks [facility] CARD PHASE 2 [address] Donald Felton Cardiopulmonary Rehab Light Stent 10/14/19    In 4 weeks [facility] PHASE 2 [address]63 Donald Felton Cardiopulmonary Rehab Light Stent 10/14/19    In 1 month [facility] PHASE 2 [address] [facility]

## 2019-12-11 ENCOUNTER — CARDPULM VISIT (OUTPATIENT)
Dept: CARDIAC REHAB | Facility: HOSPITAL | Age: 69
End: 2019-12-11
Attending: INTERNAL MEDICINE
Payer: MEDICARE

## 2019-12-11 PROCEDURE — 93798 PHYS/QHP OP CAR RHAB W/ECG: CPT

## 2019-12-16 ENCOUNTER — CARDPULM VISIT (OUTPATIENT)
Dept: CARDIAC REHAB | Facility: HOSPITAL | Age: 69
End: 2019-12-16
Attending: INTERNAL MEDICINE
Payer: MEDICARE

## 2019-12-16 PROCEDURE — 93798 PHYS/QHP OP CAR RHAB W/ECG: CPT

## 2019-12-18 ENCOUNTER — CARDPULM VISIT (OUTPATIENT)
Dept: CARDIAC REHAB | Facility: HOSPITAL | Age: 69
End: 2019-12-18
Attending: INTERNAL MEDICINE
Payer: MEDICARE

## 2019-12-18 PROCEDURE — 93798 PHYS/QHP OP CAR RHAB W/ECG: CPT

## 2019-12-23 ENCOUNTER — CARDPULM VISIT (OUTPATIENT)
Dept: CARDIAC REHAB | Facility: HOSPITAL | Age: 69
End: 2019-12-23
Attending: INTERNAL MEDICINE
Payer: MEDICARE

## 2019-12-23 PROCEDURE — 93798 PHYS/QHP OP CAR RHAB W/ECG: CPT

## 2020-01-02 ENCOUNTER — HOSPITAL ENCOUNTER (OUTPATIENT)
Dept: CV DIAGNOSTICS | Age: 70
Discharge: HOME | End: 2020-01-02
Attending: INTERNAL MEDICINE
Payer: MEDICARE

## 2020-01-02 DIAGNOSIS — R01.1 HEART MURMUR: ICD-10-CM

## 2020-01-02 PROCEDURE — 93306 TTE W/DOPPLER COMPLETE: CPT | Performed by: INTERNAL MEDICINE

## 2020-01-03 ENCOUNTER — CARDPULM VISIT (OUTPATIENT)
Dept: CARDIAC REHAB | Facility: HOSPITAL | Age: 70
End: 2020-01-03
Attending: INTERNAL MEDICINE
Payer: MEDICARE

## 2020-01-03 PROCEDURE — 93798 PHYS/QHP OP CAR RHAB W/ECG: CPT

## 2020-01-06 ENCOUNTER — CARDPULM VISIT (OUTPATIENT)
Dept: CARDIAC REHAB | Facility: HOSPITAL | Age: 70
End: 2020-01-06
Attending: INTERNAL MEDICINE
Payer: MEDICARE

## 2020-01-06 PROCEDURE — 93798 PHYS/QHP OP CAR RHAB W/ECG: CPT

## 2020-01-10 ENCOUNTER — CARDPULM VISIT (OUTPATIENT)
Dept: CARDIAC REHAB | Facility: HOSPITAL | Age: 70
End: 2020-01-10
Attending: INTERNAL MEDICINE
Payer: MEDICARE

## 2020-01-10 PROCEDURE — 93798 PHYS/QHP OP CAR RHAB W/ECG: CPT

## 2020-01-13 ENCOUNTER — CARDPULM VISIT (OUTPATIENT)
Dept: CARDIAC REHAB | Facility: HOSPITAL | Age: 70
End: 2020-01-13
Attending: INTERNAL MEDICINE
Payer: MEDICARE

## 2020-01-13 PROCEDURE — 93798 PHYS/QHP OP CAR RHAB W/ECG: CPT

## 2020-01-15 ENCOUNTER — CARDPULM VISIT (OUTPATIENT)
Dept: CARDIAC REHAB | Facility: HOSPITAL | Age: 70
End: 2020-01-15
Attending: INTERNAL MEDICINE
Payer: MEDICARE

## 2020-01-15 PROCEDURE — 93798 PHYS/QHP OP CAR RHAB W/ECG: CPT

## 2020-01-20 ENCOUNTER — CARDPULM VISIT (OUTPATIENT)
Dept: CARDIAC REHAB | Facility: HOSPITAL | Age: 70
End: 2020-01-20
Attending: INTERNAL MEDICINE
Payer: MEDICARE

## 2020-01-20 PROCEDURE — 93798 PHYS/QHP OP CAR RHAB W/ECG: CPT

## 2020-01-22 ENCOUNTER — CARDPULM VISIT (OUTPATIENT)
Dept: CARDIAC REHAB | Facility: HOSPITAL | Age: 70
End: 2020-01-22
Attending: INTERNAL MEDICINE
Payer: MEDICARE

## 2020-01-22 PROCEDURE — 93798 PHYS/QHP OP CAR RHAB W/ECG: CPT

## 2020-01-27 ENCOUNTER — CARDPULM VISIT (OUTPATIENT)
Dept: CARDIAC REHAB | Facility: HOSPITAL | Age: 70
End: 2020-01-27
Attending: INTERNAL MEDICINE
Payer: MEDICARE

## 2020-01-27 PROCEDURE — 93798 PHYS/QHP OP CAR RHAB W/ECG: CPT

## 2020-01-29 ENCOUNTER — CARDPULM VISIT (OUTPATIENT)
Dept: CARDIAC REHAB | Facility: HOSPITAL | Age: 70
End: 2020-01-29
Attending: INTERNAL MEDICINE
Payer: MEDICARE

## 2020-01-29 PROCEDURE — 93798 PHYS/QHP OP CAR RHAB W/ECG: CPT

## 2020-01-30 ENCOUNTER — MED REC SCAN ONLY (OUTPATIENT)
Dept: INTERNAL MEDICINE CLINIC | Facility: CLINIC | Age: 70
End: 2020-01-30

## 2020-01-31 ENCOUNTER — CARDPULM VISIT (OUTPATIENT)
Dept: CARDIAC REHAB | Facility: HOSPITAL | Age: 70
End: 2020-01-31
Attending: INTERNAL MEDICINE
Payer: MEDICARE

## 2020-01-31 PROCEDURE — 93798 PHYS/QHP OP CAR RHAB W/ECG: CPT

## 2020-02-03 ENCOUNTER — CARDPULM VISIT (OUTPATIENT)
Dept: CARDIAC REHAB | Facility: HOSPITAL | Age: 70
End: 2020-02-03
Attending: INTERNAL MEDICINE
Payer: MEDICARE

## 2020-02-03 PROCEDURE — 93798 PHYS/QHP OP CAR RHAB W/ECG: CPT

## 2020-02-05 ENCOUNTER — CARDPULM VISIT (OUTPATIENT)
Dept: CARDIAC REHAB | Facility: HOSPITAL | Age: 70
End: 2020-02-05
Attending: INTERNAL MEDICINE
Payer: MEDICARE

## 2020-02-05 PROCEDURE — 93798 PHYS/QHP OP CAR RHAB W/ECG: CPT

## 2020-02-07 ENCOUNTER — CARDPULM VISIT (OUTPATIENT)
Dept: CARDIAC REHAB | Facility: HOSPITAL | Age: 70
End: 2020-02-07
Attending: INTERNAL MEDICINE
Payer: MEDICARE

## 2020-02-07 PROCEDURE — 93798 PHYS/QHP OP CAR RHAB W/ECG: CPT

## 2020-02-10 ENCOUNTER — APPOINTMENT (OUTPATIENT)
Dept: CARDIAC REHAB | Facility: HOSPITAL | Age: 70
End: 2020-02-10
Attending: INTERNAL MEDICINE
Payer: MEDICARE

## 2020-02-17 ENCOUNTER — CARDPULM VISIT (OUTPATIENT)
Dept: CARDIAC REHAB | Facility: HOSPITAL | Age: 70
End: 2020-02-17
Attending: INTERNAL MEDICINE
Payer: MEDICARE

## 2020-02-17 PROCEDURE — 93798 PHYS/QHP OP CAR RHAB W/ECG: CPT

## 2020-02-19 ENCOUNTER — APPOINTMENT (OUTPATIENT)
Dept: CARDIAC REHAB | Facility: HOSPITAL | Age: 70
End: 2020-02-19
Attending: INTERNAL MEDICINE
Payer: MEDICARE

## 2020-02-19 PROCEDURE — 93798 PHYS/QHP OP CAR RHAB W/ECG: CPT

## 2020-02-20 RX ORDER — NITROGLYCERIN 0.4 MG/1
TABLET SUBLINGUAL
Qty: 25 TABLET | Refills: 1 | Status: SHIPPED | OUTPATIENT
Start: 2020-02-20 | End: 2020-06-10

## 2020-02-20 RX ORDER — AMLODIPINE BESYLATE 5 MG/1
TABLET ORAL
Qty: 90 TABLET | Refills: 1 | Status: SHIPPED | OUTPATIENT
Start: 2020-02-20 | End: 2020-08-12

## 2020-02-20 NOTE — TELEPHONE ENCOUNTER
Review pended refill request as it does not fall under a protocol.   LR Nitroglycerin 9-26-19 # 20    Requested Prescriptions     Pending Prescriptions Disp Refills   • NITROGLYCERIN 0.4 MG Sublingual SL Tab [Pharmacy Med Name: NITROGLYCERIN 0.4 MG TABLET S

## 2020-02-20 NOTE — TELEPHONE ENCOUNTER
To on call Provider for Dr Katherine Guadarrama, pls advise, thanks in advance.    LR Amlodipine 2-25-19 # 180 + 1    Hypertensive Medications  Protocol Criteria:  · Appointment scheduled in the past 6 months or in the next 3 months  · BMP or CMP in the past 12 months  · Stent 10/14/19    In 2 weeks [facility] PHASE 2 [address] Donald Felton Cardiopulmonary Rehab Light Stent 10/14/19    In 2 weeks [facility] PHASE 2 13068 Donald Fraire  Cardiopulmonary Rehab [facility] Stent 10/14/19    In 3 weeks [facility] PHASE 2 ALT 17 09/26/2019    BILT 0.7 09/26/2019    TP 7.5 09/26/2019    ALB 3.8 09/26/2019     10/15/2019    K 4.2 10/15/2019     10/15/2019    CO2 24.0 10/15/2019    GLOBULIN 3.7 09/26/2019    AGRATIO 1.6 05/02/2016    ANIONGAP 6 10/15/2019    OSM

## 2020-02-21 ENCOUNTER — APPOINTMENT (OUTPATIENT)
Dept: CARDIAC REHAB | Facility: HOSPITAL | Age: 70
End: 2020-02-21
Attending: INTERNAL MEDICINE
Payer: MEDICARE

## 2020-02-21 PROCEDURE — 93798 PHYS/QHP OP CAR RHAB W/ECG: CPT

## 2020-02-24 ENCOUNTER — CARDPULM VISIT (OUTPATIENT)
Dept: CARDIAC REHAB | Facility: HOSPITAL | Age: 70
End: 2020-02-24
Attending: INTERNAL MEDICINE
Payer: MEDICARE

## 2020-02-24 PROCEDURE — 93798 PHYS/QHP OP CAR RHAB W/ECG: CPT

## 2020-02-24 RX ORDER — FUROSEMIDE 20 MG/1
TABLET ORAL
Qty: 90 TABLET | Refills: 1 | Status: SHIPPED | OUTPATIENT
Start: 2020-02-24 | End: 2020-06-19

## 2020-02-26 ENCOUNTER — APPOINTMENT (OUTPATIENT)
Dept: CARDIAC REHAB | Facility: HOSPITAL | Age: 70
End: 2020-02-26
Attending: INTERNAL MEDICINE
Payer: MEDICARE

## 2020-02-26 PROCEDURE — 93798 PHYS/QHP OP CAR RHAB W/ECG: CPT

## 2020-02-28 ENCOUNTER — CARDPULM VISIT (OUTPATIENT)
Dept: CARDIAC REHAB | Facility: HOSPITAL | Age: 70
End: 2020-02-28
Attending: INTERNAL MEDICINE
Payer: MEDICARE

## 2020-02-28 PROCEDURE — 93798 PHYS/QHP OP CAR RHAB W/ECG: CPT

## 2020-03-02 ENCOUNTER — APPOINTMENT (OUTPATIENT)
Dept: CARDIAC REHAB | Facility: HOSPITAL | Age: 70
End: 2020-03-02
Attending: INTERNAL MEDICINE
Payer: MEDICARE

## 2020-03-02 PROCEDURE — 93798 PHYS/QHP OP CAR RHAB W/ECG: CPT

## 2020-03-04 ENCOUNTER — APPOINTMENT (OUTPATIENT)
Dept: CARDIAC REHAB | Facility: HOSPITAL | Age: 70
End: 2020-03-04
Attending: INTERNAL MEDICINE
Payer: MEDICARE

## 2020-03-04 PROCEDURE — 93798 PHYS/QHP OP CAR RHAB W/ECG: CPT

## 2020-03-06 ENCOUNTER — CARDPULM VISIT (OUTPATIENT)
Dept: CARDIAC REHAB | Facility: HOSPITAL | Age: 70
End: 2020-03-06
Attending: INTERNAL MEDICINE
Payer: MEDICARE

## 2020-03-06 PROCEDURE — 93798 PHYS/QHP OP CAR RHAB W/ECG: CPT

## 2020-03-08 NOTE — TELEPHONE ENCOUNTER
Refill passed per [facility], [facility] protocol.   Refill Protocol Appointment Criteria  · Appointment scheduled in the past 12 months or in the next 3 months  Recent Outpatient Visits            3 months ago Primary osteoarthritis of right knee    [facility] 10/14/19    In 3 weeks [facility] CARD PHASE 2 [address]63 Donald Felton Cardiopulmonary Rehab Light Stent 10/14/19    In 3 weeks [facility] PHASE 2 26275 Donald Felton Cardiopulmonary Rehab Light Stent 10/14/19    In 3 weeks [facility] CARD PHASE 2 [facility]

## 2020-03-09 ENCOUNTER — APPOINTMENT (OUTPATIENT)
Dept: CARDIAC REHAB | Facility: HOSPITAL | Age: 70
End: 2020-03-09
Attending: INTERNAL MEDICINE
Payer: MEDICARE

## 2020-03-09 PROCEDURE — 93798 PHYS/QHP OP CAR RHAB W/ECG: CPT

## 2020-03-11 ENCOUNTER — APPOINTMENT (OUTPATIENT)
Dept: CARDIAC REHAB | Facility: HOSPITAL | Age: 70
End: 2020-03-11
Attending: INTERNAL MEDICINE
Payer: MEDICARE

## 2020-03-11 PROCEDURE — 93798 PHYS/QHP OP CAR RHAB W/ECG: CPT

## 2020-03-11 NOTE — TELEPHONE ENCOUNTER
Please review; protocol failed.     Diabetes Medication Protocol Failed3/10 2:00 AM   Last A1C < 7.5 and within past 6 months

## 2020-03-13 ENCOUNTER — APPOINTMENT (OUTPATIENT)
Dept: CARDIAC REHAB | Facility: HOSPITAL | Age: 70
End: 2020-03-13
Attending: INTERNAL MEDICINE
Payer: MEDICARE

## 2020-03-16 ENCOUNTER — APPOINTMENT (OUTPATIENT)
Dept: CARDIAC REHAB | Facility: HOSPITAL | Age: 70
End: 2020-03-16
Attending: INTERNAL MEDICINE
Payer: MEDICARE

## 2020-03-18 ENCOUNTER — APPOINTMENT (OUTPATIENT)
Dept: CARDIAC REHAB | Facility: HOSPITAL | Age: 70
End: 2020-03-18
Attending: INTERNAL MEDICINE
Payer: MEDICARE

## 2020-03-20 ENCOUNTER — APPOINTMENT (OUTPATIENT)
Dept: CARDIAC REHAB | Facility: HOSPITAL | Age: 70
End: 2020-03-20
Attending: INTERNAL MEDICINE
Payer: MEDICARE

## 2020-03-23 ENCOUNTER — APPOINTMENT (OUTPATIENT)
Dept: CARDIAC REHAB | Facility: HOSPITAL | Age: 70
End: 2020-03-23
Attending: INTERNAL MEDICINE
Payer: MEDICARE

## 2020-03-25 ENCOUNTER — APPOINTMENT (OUTPATIENT)
Dept: CARDIAC REHAB | Facility: HOSPITAL | Age: 70
End: 2020-03-25
Attending: INTERNAL MEDICINE
Payer: MEDICARE

## 2020-03-27 ENCOUNTER — APPOINTMENT (OUTPATIENT)
Dept: CARDIAC REHAB | Facility: HOSPITAL | Age: 70
End: 2020-03-27
Attending: INTERNAL MEDICINE
Payer: MEDICARE

## 2020-03-30 ENCOUNTER — APPOINTMENT (OUTPATIENT)
Dept: CARDIAC REHAB | Facility: HOSPITAL | Age: 70
End: 2020-03-30
Attending: INTERNAL MEDICINE
Payer: MEDICARE

## 2020-04-01 ENCOUNTER — APPOINTMENT (OUTPATIENT)
Dept: CARDIAC REHAB | Facility: HOSPITAL | Age: 70
End: 2020-04-01
Attending: INTERNAL MEDICINE
Payer: MEDICARE

## 2020-04-02 ENCOUNTER — MED REC SCAN ONLY (OUTPATIENT)
Dept: INTERNAL MEDICINE CLINIC | Facility: CLINIC | Age: 70
End: 2020-04-02

## 2020-04-03 ENCOUNTER — APPOINTMENT (OUTPATIENT)
Dept: CARDIAC REHAB | Facility: HOSPITAL | Age: 70
End: 2020-04-03
Attending: INTERNAL MEDICINE
Payer: MEDICARE

## 2020-04-06 ENCOUNTER — APPOINTMENT (OUTPATIENT)
Dept: CARDIAC REHAB | Facility: HOSPITAL | Age: 70
End: 2020-04-06
Attending: INTERNAL MEDICINE
Payer: MEDICARE

## 2020-04-08 ENCOUNTER — APPOINTMENT (OUTPATIENT)
Dept: CARDIAC REHAB | Facility: HOSPITAL | Age: 70
End: 2020-04-08
Attending: INTERNAL MEDICINE
Payer: MEDICARE

## 2020-04-10 ENCOUNTER — APPOINTMENT (OUTPATIENT)
Dept: CARDIAC REHAB | Facility: HOSPITAL | Age: 70
End: 2020-04-10
Attending: INTERNAL MEDICINE
Payer: MEDICARE

## 2020-04-14 ENCOUNTER — TELEPHONE (OUTPATIENT)
Dept: INTERNAL MEDICINE CLINIC | Facility: CLINIC | Age: 70
End: 2020-04-14

## 2020-04-14 RX ORDER — ATORVASTATIN CALCIUM 40 MG/1
40 TABLET, FILM COATED ORAL NIGHTLY
Qty: 90 TABLET | Refills: 0 | Status: SHIPPED | OUTPATIENT
Start: 2020-04-14 | End: 2021-01-04

## 2020-04-18 ENCOUNTER — TELEPHONE (OUTPATIENT)
Dept: INTERNAL MEDICINE CLINIC | Facility: CLINIC | Age: 70
End: 2020-04-18

## 2020-04-18 DIAGNOSIS — E11.65 UNCONTROLLED TYPE 2 DIABETES MELLITUS WITH HYPERGLYCEMIA (HCC): Primary | ICD-10-CM

## 2020-04-18 DIAGNOSIS — E53.8 VITAMIN B12 DEFICIENCY: ICD-10-CM

## 2020-04-18 DIAGNOSIS — E55.9 VITAMIN D DEFICIENCY: ICD-10-CM

## 2020-04-18 DIAGNOSIS — Z12.39 BREAST CANCER SCREENING: ICD-10-CM

## 2020-04-18 NOTE — TELEPHONE ENCOUNTER
1.  Due for eye exam for the diabetes. Is a referral that is still good for Dr. Nini Moise til September 26, 2020.  2.  Patient also due for her mammogram April 26. Orders written. 3.  Also due for blood work for the diabetes.   Orders written fasting shin

## 2020-04-22 PROBLEM — R94.39 ABNORMAL NUCLEAR STRESS TEST: Status: RESOLVED | Noted: 2019-10-11 | Resolved: 2020-04-22

## 2020-04-23 ENCOUNTER — VIRTUAL PHONE E/M (OUTPATIENT)
Dept: INTERNAL MEDICINE CLINIC | Facility: CLINIC | Age: 70
End: 2020-04-23
Payer: MEDICARE

## 2020-04-23 DIAGNOSIS — E78.2 COMBINED HYPERLIPIDEMIA: ICD-10-CM

## 2020-04-23 DIAGNOSIS — E53.8 VITAMIN B 12 DEFICIENCY: ICD-10-CM

## 2020-04-23 DIAGNOSIS — E11.65 UNCONTROLLED TYPE 2 DIABETES MELLITUS WITH HYPERGLYCEMIA (HCC): ICD-10-CM

## 2020-04-23 DIAGNOSIS — I25.709 CORONARY ARTERY DISEASE INVOLVING CORONARY BYPASS GRAFT OF NATIVE HEART WITH ANGINA PECTORIS (HCC): ICD-10-CM

## 2020-04-23 DIAGNOSIS — E55.9 VITAMIN D DEFICIENCY: ICD-10-CM

## 2020-04-23 DIAGNOSIS — I10 HTN (HYPERTENSION), BENIGN: Primary | ICD-10-CM

## 2020-04-23 PROCEDURE — 99443 PHONE E/M BY PHYS 21-30 MIN: CPT | Performed by: INTERNAL MEDICINE

## 2020-04-23 RX ORDER — BENAZEPRIL HYDROCHLORIDE 20 MG/1
20 TABLET ORAL 2 TIMES DAILY
Qty: 180 TABLET | Refills: 1 | Status: SHIPPED | OUTPATIENT
Start: 2020-04-23 | End: 2020-10-16

## 2020-04-23 NOTE — PROGRESS NOTES
Virtual Telephone Check-In    Tyron Carrion verbally consents to a Virtual/Telephone Check-In visit on 04/23/20. Patient understands and accepts financial responsibility for any deductible, co-insurance and/or co-pays associated with this service.     Dur Rfl: 0  •  insulin glargine (LANTUS SOLOSTAR) 100 UNIT/ML Subcutaneous Solution Pen-injector, INJECT 52 UNITS SUBCUTANEOUSLY EVERY 12 HOURS, Disp: 40 pen, Rfl: 1  •  clotrimazole-betamethasone 1-0.05 % External Cream, Apply q12hrs. For 2 weeks on dry skin. hypertension. BP at home: not check. Has machine but does not know where it is. Has been compliant with medications. Exercise level: somewhat active and has been following low salt diet. Weight has been stable.   Wt Readings from Last 3 Encounters:  12/ (enc vitals) - - 172/94 128/62 152/82   Last Foot Exam - - - - -   Last Eye Exam - - - - -   Eye Doctor Name - - - - -   Eye Exam Retinopathy - - - - -   PHQ2 Score - - - - -   PHQ2 Score - - - - -   PHQ2 Score (Depression/Suicide Screening) - - - - -   PH with sores and ulcers on feet bilaterally. Check eyes every year with dilated eye exam.     Coronary artery disease involving coronary bypass graft of native heart with angina pectoris ([facility] [address]  -     CARDIO - EXTERNAL  Patient currently without symptoms.

## 2020-05-12 ENCOUNTER — APPOINTMENT (OUTPATIENT)
Dept: LAB | Age: 70
End: 2020-05-12
Attending: INTERNAL MEDICINE
Payer: MEDICARE

## 2020-05-12 ENCOUNTER — HOSPITAL ENCOUNTER (OUTPATIENT)
Dept: MAMMOGRAPHY | Age: 70
Discharge: HOME | End: 2020-05-12
Attending: INTERNAL MEDICINE
Payer: MEDICARE

## 2020-05-12 ENCOUNTER — TELEPHONE (OUTPATIENT)
Dept: INTERNAL MEDICINE CLINIC | Facility: CLINIC | Age: 70
End: 2020-05-12

## 2020-05-12 DIAGNOSIS — E53.8 VITAMIN B12 DEFICIENCY: ICD-10-CM

## 2020-05-12 DIAGNOSIS — E11.65 UNCONTROLLED TYPE 2 DIABETES MELLITUS WITH HYPERGLYCEMIA (HCC): ICD-10-CM

## 2020-05-12 DIAGNOSIS — Z12.39 BREAST CANCER SCREENING: ICD-10-CM

## 2020-05-12 DIAGNOSIS — E55.9 VITAMIN D DEFICIENCY: ICD-10-CM

## 2020-05-12 PROCEDURE — 36415 COLL VENOUS BLD VENIPUNCTURE: CPT

## 2020-05-12 PROCEDURE — 80053 COMPREHEN METABOLIC PANEL: CPT

## 2020-05-12 PROCEDURE — 82607 VITAMIN B-12: CPT

## 2020-05-12 PROCEDURE — 77063 BREAST TOMOSYNTHESIS BI: CPT | Performed by: INTERNAL MEDICINE

## 2020-05-12 PROCEDURE — 77067 SCR MAMMO BI INCL CAD: CPT | Performed by: INTERNAL MEDICINE

## 2020-05-12 PROCEDURE — 82306 VITAMIN D 25 HYDROXY: CPT

## 2020-05-12 PROCEDURE — 80061 LIPID PANEL: CPT

## 2020-05-12 PROCEDURE — 83036 HEMOGLOBIN GLYCOSYLATED A1C: CPT

## 2020-05-14 NOTE — TELEPHONE ENCOUNTER
Called patient, give her blood work results. Patient had no question. I tried to schedule her for nurse appointment to get the B12 injection but patient states she doesn't want to make an appointment now. She will call another day to schedule it.

## 2020-05-30 ENCOUNTER — TELEPHONE (OUTPATIENT)
Dept: INTERNAL MEDICINE CLINIC | Facility: CLINIC | Age: 70
End: 2020-05-30

## 2020-06-01 ENCOUNTER — TELEPHONE (OUTPATIENT)
Dept: INTERNAL MEDICINE CLINIC | Facility: CLINIC | Age: 70
End: 2020-06-01

## 2020-06-04 ENCOUNTER — TELEPHONE (OUTPATIENT)
Dept: INTERNAL MEDICINE CLINIC | Facility: CLINIC | Age: 70
End: 2020-06-04

## 2020-06-04 NOTE — TELEPHONE ENCOUNTER
Pharmacy stts they received a refill for Rx insulin glargine and insurance will cover insulin Lantus. Pharmacy wanted to know Dr will be able to resend and change.  Please advise       Current Outpatient Medications   Medication Sig Dispense Refill   • insu

## 2020-06-10 RX ORDER — NITROGLYCERIN 0.4 MG/1
0.4 TABLET SUBLINGUAL
Qty: 25 TABLET | Refills: 0 | Status: ON HOLD | OUTPATIENT
Start: 2020-06-10 | End: 2020-07-06

## 2020-06-10 NOTE — TELEPHONE ENCOUNTER
Advised patient of Dr. Kd Salamanca note. Patient verbalized understanding and had no further questions.

## 2020-06-10 NOTE — TELEPHONE ENCOUNTER
Current Outpatient Medications:   •  i•  NITROGLYCERIN 0.4 MG Sublingual SL Tab, PLACE 1 TABLET UNDER THE TONGUE EVERY 5 (FIVE) MINUTES AS NEEDED FOR CHEST PAIN., Disp: 25 tablet, Rfl: 1  •

## 2020-06-10 NOTE — TELEPHONE ENCOUNTER
Was given a refill February 20, 2020 with 25 tablets and a refill that means she has been taking it more often than that that is a concern. I will refill at this time as a one-time but she needs to schedule with cardiology ASAP.

## 2020-06-10 NOTE — TELEPHONE ENCOUNTER
Spoke pharmacy who states pt picked up last refill in April.     Spoke with pt who states she takes medication about every two weeks for chest tightness     Pt denies chest pain, difficulty breathing, arm/shoulder/jaw pain, blurred vision, weakness, dizzine

## 2020-06-12 NOTE — TELEPHONE ENCOUNTER
Called patient, [facility] and [facility] here for him. Left message to call us for appt. Need to clarify which office.

## 2020-06-17 NOTE — TELEPHONE ENCOUNTER
Patient was scheduled at [facility] by phone room for Dr. Christopher Saha 6/19

## 2020-06-19 ENCOUNTER — TELEPHONE (OUTPATIENT)
Dept: CARDIOLOGY CLINIC | Facility: CLINIC | Age: 70
End: 2020-06-19

## 2020-06-19 ENCOUNTER — VIRTUAL PHONE E/M (OUTPATIENT)
Dept: CARDIOLOGY CLINIC | Facility: CLINIC | Age: 70
End: 2020-06-19
Payer: MEDICARE

## 2020-06-19 DIAGNOSIS — I10 HTN (HYPERTENSION), BENIGN: ICD-10-CM

## 2020-06-19 DIAGNOSIS — E78.2 COMBINED HYPERLIPIDEMIA: ICD-10-CM

## 2020-06-19 DIAGNOSIS — I25.700 CORONARY ARTERY DISEASE INVOLVING CORONARY BYPASS GRAFT OF NATIVE HEART WITH UNSTABLE ANGINA PECTORIS (HCC): Primary | ICD-10-CM

## 2020-06-19 PROBLEM — I50.33 ACUTE ON CHRONIC DIASTOLIC CONGESTIVE HEART FAILURE (HCC): Status: ACTIVE | Noted: 2020-06-19

## 2020-06-19 PROCEDURE — 99442 PHONE E/M BY PHYS 11-20 MIN: CPT | Performed by: INTERNAL MEDICINE

## 2020-06-19 RX ORDER — FUROSEMIDE 20 MG/1
20 TABLET ORAL 2 TIMES DAILY
Qty: 60 TABLET | Refills: 3 | Status: SHIPPED | OUTPATIENT
Start: 2020-06-19 | End: 2020-09-15

## 2020-06-19 NOTE — PATIENT INSTRUCTIONS
Increase Lasix to 20 mg twice a day. Blood test for kidney function next week. Nuclear stress test as discussed within the next 1 to 2 weeks. Follow-up with APN in 2 weeks or sooner if needed.

## 2020-06-19 NOTE — PROGRESS NOTES
Due to COVID-19 ACTION PLAN, the patient's office visit was converted to a phone visit. ? This patient verbally consents to a telephone visit. Patient states they are Anni Ayala and that they are speaking to me from their home.      It has been 6 month

## 2020-06-30 ENCOUNTER — HOSPITAL ENCOUNTER (OUTPATIENT)
Dept: CV DIAGNOSTICS | Facility: HOSPITAL | Age: 70
Discharge: HOME | End: 2020-06-30
Attending: INTERNAL MEDICINE
Payer: MEDICARE

## 2020-06-30 ENCOUNTER — HOSPITAL ENCOUNTER (OUTPATIENT)
Dept: NUCLEAR MEDICINE | Facility: HOSPITAL | Age: 70
Discharge: HOME | End: 2020-06-30
Attending: INTERNAL MEDICINE
Payer: MEDICARE

## 2020-06-30 DIAGNOSIS — I25.700 CORONARY ARTERY DISEASE INVOLVING CORONARY BYPASS GRAFT OF NATIVE HEART WITH UNSTABLE ANGINA PECTORIS (HCC): ICD-10-CM

## 2020-06-30 DIAGNOSIS — E78.2 COMBINED HYPERLIPIDEMIA: ICD-10-CM

## 2020-06-30 DIAGNOSIS — I10 HTN (HYPERTENSION), BENIGN: ICD-10-CM

## 2020-06-30 LAB
ANION GAP SERPL CALC-SCNC: 8 MMOL/L (ref 0–18)
BUN BLD-MCNC: 17 MG/DL (ref 7–18)
BUN/CREAT SERPL: 16.8 (ref 10–20)
CALCIUM BLD-MCNC: 9.4 MG/DL (ref 8.5–10.1)
CHLORIDE SERPL-SCNC: 105 MMOL/L (ref 98–112)
CO2 SERPL-SCNC: 26 MMOL/L (ref 21–32)
CREAT BLD-MCNC: 1.01 MG/DL (ref 0.55–1.02)
GLUCOSE BLD-MCNC: 218 MG/DL (ref 70–99)
OSMOLALITY SERPL CALC.SUM OF ELEC: 296 MOSM/KG (ref 275–295)
PATIENT FASTING Y/N/NP: YES
POTASSIUM SERPL-SCNC: 4.6 MMOL/L (ref 3.5–5.1)
SODIUM SERPL-SCNC: 139 MMOL/L (ref 136–145)

## 2020-06-30 PROCEDURE — 78452 HT MUSCLE IMAGE SPECT MULT: CPT | Performed by: INTERNAL MEDICINE

## 2020-06-30 PROCEDURE — 36415 COLL VENOUS BLD VENIPUNCTURE: CPT

## 2020-06-30 PROCEDURE — 93016 CV STRESS TEST SUPVJ ONLY: CPT | Performed by: INTERNAL MEDICINE

## 2020-06-30 PROCEDURE — 93017 CV STRESS TEST TRACING ONLY: CPT | Performed by: INTERNAL MEDICINE

## 2020-06-30 PROCEDURE — 80048 BASIC METABOLIC PNL TOTAL CA: CPT

## 2020-06-30 PROCEDURE — 93018 CV STRESS TEST I&R ONLY: CPT | Performed by: INTERNAL MEDICINE

## 2020-07-01 ENCOUNTER — TELEPHONE (OUTPATIENT)
Dept: CARDIOLOGY | Age: 70
End: 2020-07-01

## 2020-07-01 ENCOUNTER — TELEPHONE (OUTPATIENT)
Dept: CARDIOLOGY CLINIC | Facility: CLINIC | Age: 70
End: 2020-07-01

## 2020-07-01 DIAGNOSIS — Z01.810 PRE-OPERATIVE CARDIOVASCULAR EXAMINATION: ICD-10-CM

## 2020-07-01 DIAGNOSIS — I25.700 CORONARY ARTERY DISEASE INVOLVING CORONARY BYPASS GRAFT OF NATIVE HEART WITH UNSTABLE ANGINA PECTORIS (HCC): Primary | ICD-10-CM

## 2020-07-01 NOTE — TELEPHONE ENCOUNTER
Adán Gaines called to say results are in on [place], abnormal.  Page out to Dr. Lobo Medley to discuss

## 2020-07-02 NOTE — TELEPHONE ENCOUNTER
Reviewed results with Dr. Jf Ross. Patient needs angiogram this Monday 7/6 or Thursday 7/9. Will notify patient and schedule.     Spoke with Charley Hernández, discussed results and Dr. Remberto Herzog recommendations for angiogram. Discussed pre-procedure wash, wrist or groin

## 2020-07-03 ENCOUNTER — LAB ENCOUNTER (OUTPATIENT)
Dept: LAB | Age: 70
End: 2020-07-03
Attending: INTERNAL MEDICINE
Payer: MEDICARE

## 2020-07-03 ENCOUNTER — APPOINTMENT (OUTPATIENT)
Dept: GENERAL RADIOLOGY | Age: 70
End: 2020-07-03
Attending: INTERNAL MEDICINE
Payer: MEDICARE

## 2020-07-03 ENCOUNTER — HOSPITAL ENCOUNTER (OUTPATIENT)
Dept: GENERAL RADIOLOGY | Age: 70
Discharge: HOME | End: 2020-07-03
Attending: INTERNAL MEDICINE
Payer: MEDICARE

## 2020-07-03 DIAGNOSIS — I25.700 CORONARY ARTERY DISEASE INVOLVING CORONARY BYPASS GRAFT OF NATIVE HEART WITH UNSTABLE ANGINA PECTORIS (HCC): ICD-10-CM

## 2020-07-03 DIAGNOSIS — Z01.810 PRE-OPERATIVE CARDIOVASCULAR EXAMINATION: ICD-10-CM

## 2020-07-03 LAB
BASOPHILS # BLD AUTO: 0.05 X10(3) UL (ref 0–0.2)
BASOPHILS NFR BLD AUTO: 0.5 %
DEPRECATED RDW RBC AUTO: 46 FL (ref 35.1–46.3)
EOSINOPHIL # BLD AUTO: 0.19 X10(3) UL (ref 0–0.7)
EOSINOPHIL NFR BLD AUTO: 1.8 %
ERYTHROCYTE [DISTWIDTH] IN BLOOD BY AUTOMATED COUNT: 15.9 % (ref 11–15)
HCT VFR BLD AUTO: 42 % (ref 35–48)
HGB BLD-MCNC: 12.7 G/DL (ref 12–16)
IMM GRANULOCYTES # BLD AUTO: 0.04 X10(3) UL (ref 0–1)
IMM GRANULOCYTES NFR BLD: 0.4 %
LYMPHOCYTES # BLD AUTO: 3.05 X10(3) UL (ref 1–4)
LYMPHOCYTES NFR BLD AUTO: 28.2 %
MCH RBC QN AUTO: 24.4 PG (ref 26–34)
MCHC RBC AUTO-ENTMCNC: 30.2 G/DL (ref 31–37)
MCV RBC AUTO: 80.6 FL (ref 80–100)
MONOCYTES # BLD AUTO: 0.75 X10(3) UL (ref 0.1–1)
MONOCYTES NFR BLD AUTO: 6.9 %
NEUTROPHILS # BLD AUTO: 6.73 X10 (3) UL (ref 1.5–7.7)
NEUTROPHILS # BLD AUTO: 6.73 X10(3) UL (ref 1.5–7.7)
NEUTROPHILS NFR BLD AUTO: 62.2 %
PLATELET # BLD AUTO: 284 10(3)UL (ref 150–450)
RBC # BLD AUTO: 5.21 X10(6)UL (ref 3.8–5.3)
WBC # BLD AUTO: 10.8 X10(3) UL (ref 4–11)

## 2020-07-03 PROCEDURE — 36415 COLL VENOUS BLD VENIPUNCTURE: CPT

## 2020-07-03 PROCEDURE — 85025 COMPLETE CBC W/AUTO DIFF WBC: CPT

## 2020-07-03 PROCEDURE — 71046 X-RAY EXAM CHEST 2 VIEWS: CPT | Performed by: INTERNAL MEDICINE

## 2020-07-04 ENCOUNTER — LAB ENCOUNTER (OUTPATIENT)
Dept: LAB | Facility: HOSPITAL | Age: 70
End: 2020-07-04
Attending: INTERNAL MEDICINE
Payer: MEDICARE

## 2020-07-04 DIAGNOSIS — Z01.818 PREOP TESTING: ICD-10-CM

## 2020-07-04 LAB — SARS-COV-2 RNA RESP QL NAA+PROBE: NOT DETECTED

## 2020-07-06 ENCOUNTER — HOSPITAL ENCOUNTER (OUTPATIENT)
Dept: INTERVENTIONAL RADIOLOGY/VASCULAR | Facility: HOSPITAL | Age: 70
Discharge: HOME | End: 2020-07-06
Attending: INTERNAL MEDICINE | Admitting: INTERNAL MEDICINE
Payer: MEDICARE

## 2020-07-06 VITALS
RESPIRATION RATE: 25 BRPM | OXYGEN SATURATION: 92 % | HEART RATE: 84 BPM | WEIGHT: 260 LBS | BODY MASS INDEX: 46 KG/M2 | DIASTOLIC BLOOD PRESSURE: 72 MMHG | SYSTOLIC BLOOD PRESSURE: 147 MMHG

## 2020-07-06 DIAGNOSIS — Z01.818 PREOP TESTING: Primary | ICD-10-CM

## 2020-07-06 DIAGNOSIS — R94.39 ABNORMAL STRESS TEST: ICD-10-CM

## 2020-07-06 LAB
GLUCOSE BLDC GLUCOMTR-MCNC: 284 MG/DL (ref 70–99)
ISTAT ACTIVATED CLOTTING TIME: 142 SECONDS (ref 125–137)
ISTAT ACTIVATED CLOTTING TIME: 191 SECONDS (ref 125–137)

## 2020-07-06 PROCEDURE — B2181ZZ FLUOROSCOPY OF LEFT INTERNAL MAMMARY BYPASS GRAFT USING LOW OSMOLAR CONTRAST: ICD-10-PCS | Performed by: INTERNAL MEDICINE

## 2020-07-06 PROCEDURE — 93461 R&L HRT ART/VENTRICLE ANGIO: CPT

## 2020-07-06 PROCEDURE — 99152 MOD SED SAME PHYS/QHP 5/>YRS: CPT | Performed by: INTERNAL MEDICINE

## 2020-07-06 PROCEDURE — 93461 R&L HRT ART/VENTRICLE ANGIO: CPT | Performed by: INTERNAL MEDICINE

## 2020-07-06 PROCEDURE — 4A023N8 MEASUREMENT OF CARDIAC SAMPLING AND PRESSURE, BILATERAL, PERCUTANEOUS APPROACH: ICD-10-PCS | Performed by: INTERNAL MEDICINE

## 2020-07-06 PROCEDURE — 36415 COLL VENOUS BLD VENIPUNCTURE: CPT

## 2020-07-06 PROCEDURE — 027034Z DILATION OF CORONARY ARTERY, ONE ARTERY WITH DRUG-ELUTING INTRALUMINAL DEVICE, PERCUTANEOUS APPROACH: ICD-10-PCS | Performed by: INTERNAL MEDICINE

## 2020-07-06 PROCEDURE — 92937 PRQ TRLUML REVSC CAB GRF 1: CPT | Performed by: INTERNAL MEDICINE

## 2020-07-06 PROCEDURE — 82962 GLUCOSE BLOOD TEST: CPT

## 2020-07-06 PROCEDURE — 99152 MOD SED SAME PHYS/QHP 5/>YRS: CPT

## 2020-07-06 PROCEDURE — B2131ZZ FLUOROSCOPY OF MULTIPLE CORONARY ARTERY BYPASS GRAFTS USING LOW OSMOLAR CONTRAST: ICD-10-PCS | Performed by: INTERNAL MEDICINE

## 2020-07-06 PROCEDURE — 85347 COAGULATION TIME ACTIVATED: CPT

## 2020-07-06 PROCEDURE — B2151ZZ FLUOROSCOPY OF LEFT HEART USING LOW OSMOLAR CONTRAST: ICD-10-PCS | Performed by: INTERNAL MEDICINE

## 2020-07-06 PROCEDURE — 99153 MOD SED SAME PHYS/QHP EA: CPT

## 2020-07-06 RX ORDER — HEPARIN SODIUM 1000 [USP'U]/ML
INJECTION, SOLUTION INTRAVENOUS; SUBCUTANEOUS
Status: COMPLETED
Start: 2020-07-06 | End: 2020-07-06

## 2020-07-06 RX ORDER — MIDAZOLAM HYDROCHLORIDE 1 MG/ML
INJECTION INTRAMUSCULAR; INTRAVENOUS
Status: COMPLETED
Start: 2020-07-06 | End: 2020-07-06

## 2020-07-06 RX ORDER — ONDANSETRON 2 MG/ML
INJECTION INTRAMUSCULAR; INTRAVENOUS
Status: COMPLETED
Start: 2020-07-06 | End: 2020-07-06

## 2020-07-06 RX ORDER — CLOPIDOGREL BISULFATE 75 MG/1
75 TABLET ORAL DAILY
Status: DISCONTINUED | OUTPATIENT
Start: 2020-07-07 | End: 2020-07-06

## 2020-07-06 RX ORDER — SODIUM CHLORIDE 9 MG/ML
INJECTION, SOLUTION INTRAVENOUS CONTINUOUS
Status: ACTIVE | OUTPATIENT
Start: 2020-07-06 | End: 2020-07-06

## 2020-07-06 RX ORDER — ASPIRIN 81 MG/1
81 TABLET ORAL DAILY
Status: DISCONTINUED | OUTPATIENT
Start: 2020-07-07 | End: 2020-07-06

## 2020-07-06 RX ORDER — CLOPIDOGREL BISULFATE 75 MG/1
TABLET ORAL
Status: COMPLETED
Start: 2020-07-06 | End: 2020-07-06

## 2020-07-06 RX ORDER — SODIUM CHLORIDE 9 MG/ML
INJECTION, SOLUTION INTRAVENOUS
Status: DISCONTINUED | OUTPATIENT
Start: 2020-07-06 | End: 2020-07-06

## 2020-07-06 RX ORDER — LIDOCAINE HYDROCHLORIDE 20 MG/ML
INJECTION, SOLUTION EPIDURAL; INFILTRATION; INTRACAUDAL; PERINEURAL
Status: COMPLETED
Start: 2020-07-06 | End: 2020-07-06

## 2020-07-06 NOTE — DIETARY NOTE
NUTRITION EDUCATION NOTE    Received consult for nutrition education per cardiac rehab order set. Appropriate education and handout(s) provided. See education section of Epic for specifics.     Stephie Burgess RDN, LDN  Clinical Nutrition  Ext [phone]

## 2020-07-06 NOTE — PROGRESS NOTES
DISCHARGE NOTE      Pt is able to sit up and ambulate without difficulty. Pt voided and tolerated fluids and food. Procedural site remains dry and intact with good circulation, motion, and sensation. No signs and symptoms of bleeding/hematoma noted.

## 2020-07-06 NOTE — CARDIAC REHAB
Cardiac Rehab Phase I    Activity:  Distance Bedrest post procedure  Assistance needed   Patient tolerated activity . Education:  Handouts provided and reviewed: [address] Diet: Healthy Cardiac diet reviewed.     Disease Proces

## 2020-07-06 NOTE — H&P
[facility]  History and physical    [facility] Patient Status:  Outpatient in a Bed    1950 MRN [id number]   Location [facility] Attending Susana Hernández, [address] [facility] [address] Day # 0 PCP [facility] Maria Del Carmen.  Max Patrick MD     Reason ending 07/06/20 1201  Wt Readings from Last 3 Encounters:  07/02/20 : 260 lb (117.9 kg)  12/06/19 : 281 lb (127.5 kg)  12/05/19 : 281 lb (127.5 kg)      Physical Exam:   General: Alert and oriented x 3. No apparent distress.  No respiratory or constitutiona

## 2020-07-06 NOTE — PROCEDURES
[facility]    [facility] Cardiac Cath Procedure Note  Jerry Benito Patient Status:  Outpatient in a Bed    1950 MRN [id number]   Location [facility] Attending Huong Mendez, [address] [facility] Day # 0 PCP Julisa Coleman [place] advanced to RA and pressures recorded in RA, RV, PA and wedge positions under fluoroscopic and hemodynamic guidance.       RA 11 mmHg  RV 46/11 mmHg  PA 50/15 mean 32 mmHg  PCW 19 mmHg    CO 7.7 L/min/ CI 3.6 L/min/m²   dynes/ PVR 1.7 Wood units

## 2020-07-13 ENCOUNTER — TELEPHONE (OUTPATIENT)
Dept: INTERNAL MEDICINE CLINIC | Facility: CLINIC | Age: 70
End: 2020-07-13

## 2020-07-13 ENCOUNTER — OFFICE VISIT (OUTPATIENT)
Dept: CARDIOLOGY CLINIC | Facility: CLINIC | Age: 70
End: 2020-07-13
Payer: MEDICARE

## 2020-07-13 VITALS
SYSTOLIC BLOOD PRESSURE: 144 MMHG | HEART RATE: 102 BPM | DIASTOLIC BLOOD PRESSURE: 64 MMHG | WEIGHT: 283 LBS | HEIGHT: 63 IN | BODY MASS INDEX: 50.14 KG/M2

## 2020-07-13 DIAGNOSIS — I25.10 CAD IN NATIVE ARTERY: Primary | ICD-10-CM

## 2020-07-13 PROCEDURE — 99214 OFFICE O/P EST MOD 30 MIN: CPT | Performed by: NURSE PRACTITIONER

## 2020-07-13 PROCEDURE — 1111F DSCHRG MED/CURRENT MED MERGE: CPT | Performed by: NURSE PRACTITIONER

## 2020-07-13 NOTE — PROGRESS NOTES
Jaren Norton is a 81year old female. Patient presents with: Follow - Up: Cath on 7/6. Patient denies any concerns at this time. HPI:   Patient comes in today for checkup.  She sees Dr. Nell Watson she has history of coronary disease bypass surgery in 2015 s not apply Misc INJECT TWICE A  each 3   • AMLODIPINE BESYLATE 5 MG Oral Tab TAKE 1 TABLET BY MOUTH EVERY DAY 90 tablet 1   • Blood Glucose Monitoring Suppl w/Device Does not apply Kit DX E 11.22. 1 kit 0   • Glucose Blood In Vitro Strip Dx.  E11.65. History    Tobacco Use      Smoking status: Former Smoker        Packs/day: 1.00      Smokeless tobacco: Never Used    Alcohol use: No      Alcohol/week: 0.0 standard drinks    Drug use: No       REVIEW OF SYSTEMS:   GENERAL HEALTH: feels well otherwise  S

## 2020-07-14 RX ORDER — CLOTRIMAZOLE AND BETAMETHASONE DIPROPIONATE 10; .64 MG/G; MG/G
CREAM TOPICAL
Qty: 60 G | Refills: 3 | Status: SHIPPED | OUTPATIENT
Start: 2020-07-14

## 2020-08-12 RX ORDER — AMLODIPINE BESYLATE 5 MG/1
TABLET ORAL
Qty: 90 TABLET | Refills: 1 | Status: SHIPPED | OUTPATIENT
Start: 2020-08-12 | End: 2021-02-12

## 2020-09-15 RX ORDER — FUROSEMIDE 20 MG/1
TABLET ORAL
Qty: 180 TABLET | Refills: 1 | Status: SHIPPED | OUTPATIENT
Start: 2020-09-15 | End: 2021-09-03

## 2020-09-24 ENCOUNTER — TELEPHONE (OUTPATIENT)
Dept: INTERNAL MEDICINE CLINIC | Facility: CLINIC | Age: 70
End: 2020-09-24

## 2020-09-24 RX ORDER — BLOOD-GLUCOSE METER
KIT MISCELLANEOUS
Qty: 1 KIT | Refills: 0 | Status: SHIPPED | OUTPATIENT
Start: 2020-09-24

## 2020-09-24 RX ORDER — LANCETS 30 GAUGE
EACH MISCELLANEOUS
Qty: 50 EACH | Refills: 11 | Status: SHIPPED | OUTPATIENT
Start: 2020-09-24 | End: 2021-02-14

## 2020-09-24 RX ORDER — DIFLORASONE DIACETATE 0.5 MG/G
1 OINTMENT TOPICAL DAILY
Qty: 60 G | Refills: 5 | Status: SHIPPED | OUTPATIENT
Start: 2020-09-24 | End: 2020-12-16 | Stop reason: ALTCHOICE

## 2020-09-24 RX ORDER — BLOOD-GLUCOSE METER
1 EACH MISCELLANEOUS 2 TIMES DAILY
Qty: 1 KIT | Refills: 0 | Status: SHIPPED | OUTPATIENT
Start: 2020-09-24

## 2020-09-24 NOTE — TELEPHONE ENCOUNTER
He called asking for refill on these. But the pharmacy is not the same as what I have in the system. I will make sure we send it to the correct pharmacy. Can we check with patient?

## 2020-09-24 NOTE — TELEPHONE ENCOUNTER
Called patient to see if she needs a refill on Diflorasone 0.05% ointment because pharmacy sent a request for.  She states she needs Diflorasone she still has Calcipotrine 0.005^ cream

## 2020-09-25 NOTE — TELEPHONE ENCOUNTER
Called patient she states that [facility] use [facility] [place] [address] [employer]. I asked her about the cream  ( Diflorasone) she said that she got confused, but she doesn't use that one. The one she use she has refill.  The other prescriptions you can

## 2020-10-01 RX ORDER — CLOPIDOGREL BISULFATE 75 MG/1
75 TABLET ORAL DAILY
Qty: 90 TABLET | Refills: 0 | Status: SHIPPED | OUTPATIENT
Start: 2020-10-01 | End: 2020-12-16

## 2020-10-14 ENCOUNTER — TELEPHONE (OUTPATIENT)
Dept: INTERNAL MEDICINE CLINIC | Facility: CLINIC | Age: 70
End: 2020-10-14

## 2020-10-14 NOTE — TELEPHONE ENCOUNTER
Current Outpatient Medications:   •  metFORMIN HCl 1000 MG Oral Tab, Take 1 tablet (1,000 mg total) by mouth 2 (two) times daily with meals. , Disp: 180 tablet, Rfl: 1  •

## 2020-10-15 ENCOUNTER — TELEPHONE (OUTPATIENT)
Dept: INTERNAL MEDICINE CLINIC | Facility: CLINIC | Age: 70
End: 2020-10-15

## 2020-10-15 NOTE — TELEPHONE ENCOUNTER
Spoke to patient about refill request that we are receiving from [facility]. Patient states that she still has medication and she doesn't know who they are.  I asked her if she wants me to disregard the request and she states yes because she doesn't

## 2020-10-16 RX ORDER — BENAZEPRIL HYDROCHLORIDE 20 MG/1
20 TABLET ORAL 2 TIMES DAILY
Qty: 180 TABLET | Refills: 1 | Status: SHIPPED | OUTPATIENT
Start: 2020-10-16 | End: 2020-12-16 | Stop reason: ALTCHOICE

## 2020-10-24 ENCOUNTER — TELEPHONE (OUTPATIENT)
Dept: INTERNAL MEDICINE CLINIC | Facility: CLINIC | Age: 70
End: 2020-10-24

## 2020-10-24 NOTE — TELEPHONE ENCOUNTER
Received prescription refill request for patient diabetic supplies. Refills already sent on 9/24/2020 with additional refills. Spoke to Lexie from pharmacy answering service from [address] she will send a message to pharmacist to call back.

## 2020-10-27 ENCOUNTER — TELEPHONE (OUTPATIENT)
Dept: INTERNAL MEDICINE CLINIC | Facility: CLINIC | Age: 70
End: 2020-10-27

## 2020-11-29 RX ORDER — INSULIN GLARGINE 100 [IU]/ML
55 INJECTION, SOLUTION SUBCUTANEOUS 2 TIMES DAILY
Qty: 30 PEN | Refills: 1 | Status: SHIPPED | OUTPATIENT
Start: 2020-11-29 | End: 2021-06-01

## 2020-12-02 ENCOUNTER — TELEPHONE (OUTPATIENT)
Dept: INTERNAL MEDICINE CLINIC | Facility: CLINIC | Age: 70
End: 2020-12-02

## 2020-12-02 NOTE — TELEPHONE ENCOUNTER
He has not been in the office in almost a year. She needs to come in and be reevaluated she is due for a checkup. Neither of these medications are medication list and omega-3 fish oils are over-the-counter.

## 2020-12-02 NOTE — TELEPHONE ENCOUNTER
Current Outpatient Medications:   • omega-3 acid ethyl esters 1 gm #90    Oxiconazole nitrate cream 1%  120gm

## 2020-12-03 NOTE — TELEPHONE ENCOUNTER
Verified name and . Patient calling back to state that she has not requested the medications. She reports that the pharmacy requested them but she does not need them at this time. Patient had no further questions.

## 2020-12-03 NOTE — TELEPHONE ENCOUNTER
[facility] states they show no records of them sending a request over to us. Left message for patient to call back in regards to oxiconazole nitrate cream and omega-3 acid ethyl esters 1 gm.  We don't have these medications on her medication list.

## 2020-12-07 ENCOUNTER — NURSE TRIAGE (OUTPATIENT)
Dept: INTERNAL MEDICINE CLINIC | Facility: CLINIC | Age: 70
End: 2020-12-07

## 2020-12-07 NOTE — TELEPHONE ENCOUNTER
Nakia calling with complaint of intermittent pressure in armpit. Patient called office requesting  refill for nitroglycerin. Patient states arm pit pain started at 10 p.m. last night.    Patient has a history of diastolic congestive heart failure and

## 2020-12-08 ENCOUNTER — TELEPHONE (OUTPATIENT)
Dept: INTERNAL MEDICINE CLINIC | Facility: CLINIC | Age: 70
End: 2020-12-08

## 2020-12-08 ENCOUNTER — MED REC SCAN ONLY (OUTPATIENT)
Dept: INTERNAL MEDICINE CLINIC | Facility: CLINIC | Age: 70
End: 2020-12-08

## 2020-12-08 NOTE — TELEPHONE ENCOUNTER
Dr Hardik Stevens, patient was contacted, she was admitted to [facility] and had balloon angioplasty today, one of the stents had been partially occluded.  She is expecting to stay overnight

## 2020-12-08 NOTE — TELEPHONE ENCOUNTER
Can we check on patient. [place]  does not have epic so we do not have access to any other testing that was done. Does not have epic so we do not have access to any of the testing that was done.

## 2020-12-09 PROBLEM — I25.119 CORONARY ARTERY DISEASE INVOLVING NATIVE CORONARY ARTERY OF NATIVE HEART WITH ANGINA PECTORIS: Status: ACTIVE | Noted: 2020-12-09

## 2020-12-09 PROBLEM — I25.119 CORONARY ARTERY DISEASE INVOLVING NATIVE CORONARY ARTERY OF NATIVE HEART WITH ANGINA PECTORIS (HCC): Status: ACTIVE | Noted: 2020-12-09

## 2020-12-16 ENCOUNTER — OFFICE VISIT (OUTPATIENT)
Dept: INTERNAL MEDICINE CLINIC | Facility: CLINIC | Age: 70
End: 2020-12-16
Payer: MEDICARE

## 2020-12-16 VITALS
WEIGHT: 291 LBS | SYSTOLIC BLOOD PRESSURE: 125 MMHG | HEART RATE: 66 BPM | DIASTOLIC BLOOD PRESSURE: 64 MMHG | BODY MASS INDEX: 52 KG/M2 | OXYGEN SATURATION: 95 % | TEMPERATURE: 98 F

## 2020-12-16 DIAGNOSIS — J44.9 ASTHMA WITH COPD (CHRONIC OBSTRUCTIVE PULMONARY DISEASE) (HCC): Chronic | ICD-10-CM

## 2020-12-16 DIAGNOSIS — E11.65 UNCONTROLLED TYPE 2 DIABETES MELLITUS WITH HYPERGLYCEMIA (HCC): ICD-10-CM

## 2020-12-16 DIAGNOSIS — E78.2 COMBINED HYPERLIPIDEMIA: ICD-10-CM

## 2020-12-16 DIAGNOSIS — I10 HTN (HYPERTENSION), BENIGN: ICD-10-CM

## 2020-12-16 DIAGNOSIS — I25.119 CORONARY ARTERY DISEASE INVOLVING NATIVE CORONARY ARTERY OF NATIVE HEART WITH ANGINA PECTORIS (HCC): ICD-10-CM

## 2020-12-16 DIAGNOSIS — E53.8 VITAMIN B 12 DEFICIENCY: ICD-10-CM

## 2020-12-16 DIAGNOSIS — I25.709 CORONARY ARTERY DISEASE INVOLVING CORONARY BYPASS GRAFT OF NATIVE HEART WITH ANGINA PECTORIS (HCC): Primary | ICD-10-CM

## 2020-12-16 PROBLEM — N18.30 CKD (CHRONIC KIDNEY DISEASE) STAGE 3, GFR 30-59 ML/MIN (HCC): Chronic | Status: ACTIVE | Noted: 2020-12-16

## 2020-12-16 PROCEDURE — 3078F DIAST BP <80 MM HG: CPT | Performed by: NURSE PRACTITIONER

## 2020-12-16 PROCEDURE — 3074F SYST BP LT 130 MM HG: CPT | Performed by: NURSE PRACTITIONER

## 2020-12-16 PROCEDURE — 1111F DSCHRG MED/CURRENT MED MERGE: CPT | Performed by: NURSE PRACTITIONER

## 2020-12-16 PROCEDURE — 99214 OFFICE O/P EST MOD 30 MIN: CPT | Performed by: NURSE PRACTITIONER

## 2020-12-16 RX ORDER — NITROGLYCERIN 0.4 MG/1
0.4 TABLET SUBLINGUAL EVERY 5 MIN PRN
COMMUNITY
End: 2020-12-18

## 2020-12-16 RX ORDER — PRASUGREL 10 MG/1
10 TABLET, FILM COATED ORAL DAILY
COMMUNITY
End: 2021-01-08

## 2020-12-16 RX ORDER — NITROGLYCERIN 0.4 MG/1
0.4 TABLET SUBLINGUAL EVERY 5 MIN PRN
Refills: 0 | Status: CANCELLED | OUTPATIENT
Start: 2020-12-16

## 2020-12-16 RX ORDER — LISINOPRIL 5 MG/1
5 TABLET ORAL DAILY
Qty: 30 TABLET | Refills: 0 | Status: SHIPPED | OUTPATIENT
Start: 2020-12-16 | End: 2021-01-08

## 2020-12-16 RX ORDER — BUDESONIDE AND FORMOTEROL FUMARATE DIHYDRATE 160; 4.5 UG/1; UG/1
AEROSOL RESPIRATORY (INHALATION) 2 TIMES DAILY
COMMUNITY
End: 2021-02-12

## 2020-12-16 RX ORDER — LISINOPRIL 5 MG/1
5 TABLET ORAL DAILY
COMMUNITY
End: 2020-12-16

## 2020-12-16 NOTE — PATIENT INSTRUCTIONS
ASSESSMENT/PLAN:   Coronary artery disease involving coronary bypass graft of native heart with angina pectoris (hcc)  (primary encounter diagnosis)  Follow-up with cardiologist.    Magan Vaca (hypertension), benign  Careful with diet and excercise at [facility] (INTERNAL)

## 2020-12-16 NOTE — PROGRESS NOTES
HPI:    Patient ID: Vero Covington is a 78year old female. Patient presents with:  Hospital F/U     Pt following up from ER x Armpit pain. Evaluated at [facility] and admitted x 1 day due to one of the stents being partially occluded.   She has a - - -   BP (enc vitals) - 125/64 - - -   Last Foot Exam - - - - -   Last Eye Exam - - - - -   Eye Doctor Name - - - - -   Eye Exam Retinopathy - - - - -   PHQ2 Score - - - - -   PHQ2 Score - - - - -   PHQ2 Score (Depression/Suicide Screening) - - - - -   P AM    ALT 17 05/12/2020 11:40 AM    TSH 0.75 09/10/2018 11:29 AM        Lab Results   Component Value Date/Time    CHOLEST 107 05/12/2020 11:40 AM    HDL 33 (L) 05/12/2020 11:40 AM    TRIG 162 (H) 05/12/2020 11:40 AM    LDL 42 05/12/2020 11:40 AM    NONHDL clotrimazole-betamethasone 1-0.05 % External Cream APPLY EVERY 12 HOURS FOR 2 WEEKS ON DRY SKIN.  60 g 3   • DICLOFENAC SODIUM 1 % Transdermal Gel APPLY 2 GRAMS TO AFFECTED AREA 4 TIMES A  g 2   • atorvastatin 40 MG Oral Tab Take 1 tablet (40 mg tota 0   • Lancets Does not apply Misc Checks Q12hrs. Code: E11.65 (Patient not taking: Reported on 12/16/2020 ) 50 each 11   • Diflorasone Diacetate 0.05 % External Ointment Apply 1 Application topically daily.  (Patient not taking: Reported on 12/16/2020 ) 60 Encounters:  12/16/20 : 291 lb (132 kg)  07/13/20 : 283 lb (128.4 kg)  07/02/20 : 260 lb (117.9 kg)      Physical Exam   Constitutional: She is oriented to person, place, and time. Vital signs are normal. She appears well-developed and well-nourished.  She abdominal tenderness. There is no rebound and no guarding. Musculoskeletal: Normal range of motion. General: No deformity or edema.    Lymphadenopathy:        Head (right side): No submental, no submandibular, no tonsillar, no preauricular and no year with dilated eye exam.  Check sugars. 2-hour postmeal should be less than 140s. Pre-meal should be 's. Both equally affected A1c. Vitamin b 12 deficiency  Check Blood    Follow-up with Dr. Xavier Dan as scheduled or sooner if needed.   No order

## 2020-12-17 ENCOUNTER — TELEPHONE (OUTPATIENT)
Dept: INTERNAL MEDICINE CLINIC | Facility: CLINIC | Age: 70
End: 2020-12-17

## 2020-12-17 NOTE — TELEPHONE ENCOUNTER
Called patient because we received a request for refill from [facility].  Patient states that the only pharmacy she is using is [facility].

## 2020-12-18 ENCOUNTER — OFFICE VISIT (OUTPATIENT)
Dept: CARDIOLOGY CLINIC | Facility: CLINIC | Age: 70
End: 2020-12-18
Payer: MEDICARE

## 2020-12-18 VITALS
OXYGEN SATURATION: 84 % | BODY MASS INDEX: 51.74 KG/M2 | RESPIRATION RATE: 24 BRPM | SYSTOLIC BLOOD PRESSURE: 128 MMHG | HEART RATE: 98 BPM | DIASTOLIC BLOOD PRESSURE: 62 MMHG | HEIGHT: 63 IN | WEIGHT: 292 LBS

## 2020-12-18 DIAGNOSIS — I25.10 CORONARY ARTERY DISEASE INVOLVING NATIVE CORONARY ARTERY OF NATIVE HEART WITHOUT ANGINA PECTORIS: Primary | ICD-10-CM

## 2020-12-18 PROCEDURE — 3074F SYST BP LT 130 MM HG: CPT | Performed by: NURSE PRACTITIONER

## 2020-12-18 PROCEDURE — 3008F BODY MASS INDEX DOCD: CPT | Performed by: NURSE PRACTITIONER

## 2020-12-18 PROCEDURE — 99214 OFFICE O/P EST MOD 30 MIN: CPT | Performed by: NURSE PRACTITIONER

## 2020-12-18 PROCEDURE — 3078F DIAST BP <80 MM HG: CPT | Performed by: NURSE PRACTITIONER

## 2020-12-18 RX ORDER — NITROGLYCERIN 0.4 MG/1
0.4 TABLET SUBLINGUAL EVERY 5 MIN PRN
Qty: 10 TABLET | Refills: 0 | Status: SHIPPED | OUTPATIENT
Start: 2020-12-18 | End: 2020-12-21

## 2020-12-18 NOTE — PROGRESS NOTES
Emi Early is a 78year old female. Patient presents with: Follow - Up  CAD: cath 1wk ago rt groin check, PTCA w/ stent 7/2020, CABG X4 2016  Dyspnea: minimal exertion, walking  Edema: lower extremity edema    HPI:   Patient comes in today for follow-up. meals. 180 tablet 1   • FUROSEMIDE 20 MG Oral Tab TAKE 1 TABLET BY MOUTH TWICE A  tablet 1   • AMLODIPINE BESYLATE 5 MG Oral Tab TAKE 1 TABLET BY MOUTH EVERY DAY 90 tablet 1   • clotrimazole-betamethasone 1-0.05 % External Cream APPLY EVERY 12 HOURS Diagnosis Code:  E11.9. Patient using Prodigy strips.  100 strip 6   • COMFORT EZ PEN NEEDLES 31G X 6 MM Does not apply Misc q12 100 each 6        Past Medical History:   Diagnosis Date   • Asthma    • COPD (chronic obstructive pulmonary disease) ([facility] [address] to cardiac rehab when it opens in January. Like her to see Dr. Maribell Peterson in 1 to 2 months     The patient indicates understanding of these issues and agrees to the plan. The patient is asked to return in 1 to 2 months.       ROC Bolaños  12/18/2020

## 2020-12-18 NOTE — PATIENT INSTRUCTIONS
1. Continue same medications  2. Start cardiac rehab when able  3.  See Dr. Palomo Arnold in 1-2 months

## 2020-12-21 ENCOUNTER — TELEPHONE (OUTPATIENT)
Dept: CARDIOLOGY CLINIC | Facility: CLINIC | Age: 70
End: 2020-12-21

## 2020-12-21 RX ORDER — NITROGLYCERIN 0.4 MG/1
0.4 TABLET SUBLINGUAL EVERY 5 MIN PRN
Qty: 25 TABLET | Refills: 0 | Status: SHIPPED | OUTPATIENT
Start: 2020-12-21 | End: 2021-09-03

## 2020-12-21 NOTE — TELEPHONE ENCOUNTER
SCRIPT CLARIFICATION: NITROGLYCERIN CAN ONLY BE DISPENSED IN ORIGINAL CONTAINER WHICH COMES AS 25 TABLETS COULD WE DISPENSE 25 TAB?     Current Outpatient Medications:   •  nitroGLYCERIN 0.4 MG Sublingual SL Tab, Place 1 tablet (0.4 mg total) under the tong

## 2020-12-30 ENCOUNTER — TELEPHONE (OUTPATIENT)
Dept: CARDIOLOGY CLINIC | Facility: CLINIC | Age: 70
End: 2020-12-30

## 2020-12-30 NOTE — TELEPHONE ENCOUNTER
Cath report obtained from [facility]. Pt had a 100% stent thrombosis at the distal potion of Lcx s/p PTCA. plavix stopped and started on effient.  Will scan into chart

## 2021-01-05 RX ORDER — ATORVASTATIN CALCIUM 40 MG/1
40 TABLET, FILM COATED ORAL NIGHTLY
Qty: 90 TABLET | Refills: 1 | Status: SHIPPED | OUTPATIENT
Start: 2021-01-05 | End: 2021-07-13

## 2021-01-09 RX ORDER — LISINOPRIL 5 MG/1
5 TABLET ORAL DAILY
Qty: 90 TABLET | Refills: 0 | Status: SHIPPED | OUTPATIENT
Start: 2021-01-09 | End: 2021-04-19

## 2021-01-09 RX ORDER — PRASUGREL 10 MG/1
10 TABLET, FILM COATED ORAL DAILY
Qty: 90 TABLET | Refills: 0 | Status: SHIPPED | OUTPATIENT
Start: 2021-01-09 | End: 2021-07-19

## 2021-01-15 ENCOUNTER — TELEPHONE (OUTPATIENT)
Dept: INTERNAL MEDICINE CLINIC | Facility: CLINIC | Age: 71
End: 2021-01-15

## 2021-01-15 DIAGNOSIS — R06.00 DOE (DYSPNEA ON EXERTION): Primary | ICD-10-CM

## 2021-01-15 RX ORDER — ALBUTEROL SULFATE 90 UG/1
2 AEROSOL, METERED RESPIRATORY (INHALATION) EVERY 4 HOURS PRN
Qty: 1 INHALER | Refills: 1 | Status: SHIPPED | OUTPATIENT
Start: 2021-01-15

## 2021-01-15 NOTE — TELEPHONE ENCOUNTER
Is also other reasons to have shortness of breath. She also has heart disease she just had stents placed recently. She was using her Symbicort? She has not had any recent PFTs. We will put in orders. I can give her albuterol as needed.   Maybe there is

## 2021-01-15 NOTE — TELEPHONE ENCOUNTER
Called [facility] , spoke with [facility], pharmacy  Transferred to [facility] of Dr. Malik Jackson orders below,  verbalizes understanding and agrees.        Called pt to inform her of PFT and RX sent to her pharmacy      Provided information to c

## 2021-01-15 NOTE — TELEPHONE ENCOUNTER
Received call from Jillian Mayfield, pharmacy coordinator at [facility] (Med[facility] Therapy Management) on behalf of [facility] insurance    They conducted a comprehensie medication review and patient reported occasional shortness of breath     Pharmacist recommends

## 2021-01-25 DIAGNOSIS — Z23 NEED FOR VACCINATION: ICD-10-CM

## 2021-02-11 ENCOUNTER — MED REC SCAN ONLY (OUTPATIENT)
Dept: INTERNAL MEDICINE CLINIC | Facility: CLINIC | Age: 71
End: 2021-02-11

## 2021-02-11 ENCOUNTER — OFFICE VISIT (OUTPATIENT)
Dept: INTERNAL MEDICINE CLINIC | Facility: CLINIC | Age: 71
End: 2021-02-11
Payer: MEDICARE

## 2021-02-11 VITALS
HEART RATE: 84 BPM | OXYGEN SATURATION: 90 % | HEIGHT: 63 IN | BODY MASS INDEX: 51.74 KG/M2 | TEMPERATURE: 98 F | RESPIRATION RATE: 20 BRPM | DIASTOLIC BLOOD PRESSURE: 83 MMHG | SYSTOLIC BLOOD PRESSURE: 133 MMHG | WEIGHT: 292 LBS

## 2021-02-11 DIAGNOSIS — E66.01 OBESITY, CLASS III, BMI 40-49.9 (MORBID OBESITY) (HCC): ICD-10-CM

## 2021-02-11 DIAGNOSIS — I50.33 ACUTE ON CHRONIC DIASTOLIC CONGESTIVE HEART FAILURE (HCC): Primary | ICD-10-CM

## 2021-02-11 DIAGNOSIS — I25.700 CORONARY ARTERY DISEASE INVOLVING CORONARY BYPASS GRAFT OF NATIVE HEART WITH UNSTABLE ANGINA PECTORIS (HCC): ICD-10-CM

## 2021-02-11 DIAGNOSIS — B35.3 TINEA PEDIS OF BOTH FEET: ICD-10-CM

## 2021-02-11 DIAGNOSIS — R80.9 PROTEINURIA, UNSPECIFIED TYPE: ICD-10-CM

## 2021-02-11 DIAGNOSIS — H52.203 HYPEROPIA OF BOTH EYES WITH ASTIGMATISM AND PRESBYOPIA: ICD-10-CM

## 2021-02-11 DIAGNOSIS — H52.03 HYPEROPIA OF BOTH EYES WITH ASTIGMATISM AND PRESBYOPIA: ICD-10-CM

## 2021-02-11 DIAGNOSIS — Z71.85 VACCINE COUNSELING: ICD-10-CM

## 2021-02-11 DIAGNOSIS — I25.119 CORONARY ARTERY DISEASE INVOLVING NATIVE CORONARY ARTERY OF NATIVE HEART WITH ANGINA PECTORIS (HCC): ICD-10-CM

## 2021-02-11 DIAGNOSIS — J44.9 ASTHMA WITH COPD (CHRONIC OBSTRUCTIVE PULMONARY DISEASE) (HCC): Chronic | ICD-10-CM

## 2021-02-11 DIAGNOSIS — Z00.00 ENCOUNTER FOR ANNUAL HEALTH EXAMINATION: ICD-10-CM

## 2021-02-11 DIAGNOSIS — D22.9 ATYPICAL NEVI: ICD-10-CM

## 2021-02-11 DIAGNOSIS — H40.039 ANATOMICAL NARROW ANGLE GLAUCOMA WITH BORDERLINE INTRAOCULAR PRESSURE, UNSPECIFIED LATERALITY: ICD-10-CM

## 2021-02-11 DIAGNOSIS — E55.9 VITAMIN D DEFICIENCY: ICD-10-CM

## 2021-02-11 DIAGNOSIS — Z12.31 ENCOUNTER FOR SCREENING MAMMOGRAM FOR MALIGNANT NEOPLASM OF BREAST: ICD-10-CM

## 2021-02-11 DIAGNOSIS — E78.2 COMBINED HYPERLIPIDEMIA: ICD-10-CM

## 2021-02-11 DIAGNOSIS — N28.89 RENAL MASS, LEFT: ICD-10-CM

## 2021-02-11 DIAGNOSIS — E04.2 MULTINODULAR GOITER (NONTOXIC): ICD-10-CM

## 2021-02-11 DIAGNOSIS — H52.4 HYPEROPIA OF BOTH EYES WITH ASTIGMATISM AND PRESBYOPIA: ICD-10-CM

## 2021-02-11 DIAGNOSIS — N18.32 STAGE 3B CHRONIC KIDNEY DISEASE (HCC): Chronic | ICD-10-CM

## 2021-02-11 DIAGNOSIS — E53.8 VITAMIN B 12 DEFICIENCY: ICD-10-CM

## 2021-02-11 DIAGNOSIS — H04.129 TEAR FILM INSUFFICIENCY, UNSPECIFIED LATERALITY: ICD-10-CM

## 2021-02-11 DIAGNOSIS — M81.0 AGE-RELATED OSTEOPOROSIS WITHOUT CURRENT PATHOLOGICAL FRACTURE: ICD-10-CM

## 2021-02-11 DIAGNOSIS — I10 HTN (HYPERTENSION), BENIGN: ICD-10-CM

## 2021-02-11 DIAGNOSIS — E11.65 UNCONTROLLED TYPE 2 DIABETES MELLITUS WITH HYPERGLYCEMIA (HCC): ICD-10-CM

## 2021-02-11 DIAGNOSIS — H25.13 AGE-RELATED NUCLEAR CATARACT OF BOTH EYES: ICD-10-CM

## 2021-02-11 LAB
ALBUMIN SERPL-MCNC: 3.4 G/DL (ref 3.4–5)
ALBUMIN/GLOB SERPL: 0.9 {RATIO} (ref 1–2)
ALP LIVER SERPL-CCNC: 83 U/L
ALT SERPL-CCNC: 19 U/L
ANION GAP SERPL CALC-SCNC: 11 MMOL/L (ref 0–18)
APPEARANCE: CLEAR
AST SERPL-CCNC: 17 U/L (ref 15–37)
BILIRUB SERPL-MCNC: 0.7 MG/DL (ref 0.1–2)
BILIRUB UR QL: NEGATIVE
BUN BLD-MCNC: 16 MG/DL (ref 7–18)
BUN/CREAT SERPL: 14.7 (ref 10–20)
CALCIUM BLD-MCNC: 9.1 MG/DL (ref 8.5–10.1)
CHLORIDE SERPL-SCNC: 106 MMOL/L (ref 98–112)
CHOLEST SMN-MCNC: 98 MG/DL (ref ?–200)
CO2 SERPL-SCNC: 23 MMOL/L (ref 21–32)
COLOR UR: YELLOW
CREAT BLD-MCNC: 1.09 MG/DL
CREAT UR-SCNC: 155 MG/DL
EST. AVERAGE GLUCOSE BLD GHB EST-MCNC: 180 MG/DL (ref 68–126)
GLOBULIN PLAS-MCNC: 3.8 G/DL (ref 2.8–4.4)
GLUCOSE BLD-MCNC: 175 MG/DL (ref 70–99)
GLUCOSE UR-MCNC: NEGATIVE MG/DL
HBA1C MFR BLD HPLC: 7.9 % (ref ?–5.7)
HDLC SERPL-MCNC: 33 MG/DL (ref 40–59)
HGB UR QL STRIP.AUTO: NEGATIVE
KETONES UR-MCNC: NEGATIVE MG/DL
LDLC SERPL CALC-MCNC: 33 MG/DL (ref ?–100)
M PROTEIN MFR SERPL ELPH: 7.2 G/DL (ref 6.4–8.2)
MICROALBUMIN UR-MCNC: 23.6 MG/DL
MICROALBUMIN/CREAT 24H UR-RTO: 152.3 UG/MG (ref ?–30)
MULTISTIX LOT#: 5077 NUMERIC
NITRITE UR QL STRIP.AUTO: POSITIVE
NONHDLC SERPL-MCNC: 65 MG/DL (ref ?–130)
OSMOLALITY SERPL CALC.SUM OF ELEC: 295 MOSM/KG (ref 275–295)
PATIENT FASTING Y/N/NP: YES
PATIENT FASTING Y/N/NP: YES
PH UR: 5 [PH] (ref 5–8)
PH, URINE: 5.5 (ref 4.5–8)
POTASSIUM SERPL-SCNC: 4.4 MMOL/L (ref 3.5–5.1)
PROT UR-MCNC: 100 MG/DL
PROTEIN (URINE DIPSTICK): 30 MG/DL
RBC #/AREA URNS AUTO: 2 /HPF
SODIUM SERPL-SCNC: 140 MMOL/L (ref 136–145)
SP GR UR STRIP: 1.02 (ref 1–1.03)
SPECIFIC GRAVITY: 1.02 (ref 1–1.03)
TRIGL SERPL-MCNC: 158 MG/DL (ref 30–149)
TSI SER-ACNC: 0.43 MIU/ML (ref 0.36–3.74)
URINE-COLOR: YELLOW
UROBILINOGEN UR STRIP-ACNC: <2
UROBILINOGEN,SEMI-QN: 0.2 MG/DL (ref 0–1.9)
VIT B12 SERPL-MCNC: 199 PG/ML (ref 193–986)
VLDLC SERPL CALC-MCNC: 32 MG/DL (ref 0–30)
WBC #/AREA URNS AUTO: 10 /HPF

## 2021-02-11 PROCEDURE — 99397 PER PM REEVAL EST PAT 65+ YR: CPT | Performed by: INTERNAL MEDICINE

## 2021-02-11 PROCEDURE — 81003 URINALYSIS AUTO W/O SCOPE: CPT | Performed by: INTERNAL MEDICINE

## 2021-02-11 PROCEDURE — 3008F BODY MASS INDEX DOCD: CPT | Performed by: INTERNAL MEDICINE

## 2021-02-11 PROCEDURE — 3051F HG A1C>EQUAL 7.0%<8.0%: CPT | Performed by: NURSE PRACTITIONER

## 2021-02-11 PROCEDURE — 3075F SYST BP GE 130 - 139MM HG: CPT | Performed by: INTERNAL MEDICINE

## 2021-02-11 PROCEDURE — 3061F NEG MICROALBUMINURIA REV: CPT | Performed by: NURSE PRACTITIONER

## 2021-02-11 PROCEDURE — 3060F POS MICROALBUMINURIA REV: CPT | Performed by: NURSE PRACTITIONER

## 2021-02-11 PROCEDURE — 3079F DIAST BP 80-89 MM HG: CPT | Performed by: INTERNAL MEDICINE

## 2021-02-11 PROCEDURE — G0439 PPPS, SUBSEQ VISIT: HCPCS | Performed by: INTERNAL MEDICINE

## 2021-02-11 PROCEDURE — 96160 PT-FOCUSED HLTH RISK ASSMT: CPT | Performed by: INTERNAL MEDICINE

## 2021-02-11 PROCEDURE — 3060F POS MICROALBUMINURIA REV: CPT | Performed by: INTERNAL MEDICINE

## 2021-02-11 PROCEDURE — 3061F NEG MICROALBUMINURIA REV: CPT | Performed by: INTERNAL MEDICINE

## 2021-02-11 NOTE — PROGRESS NOTES
HPI:    Patient ID: Mira Oropeza is a 78year old female. Mira Oropeza is a 78year old female who presents for a complete physical exam.   HPI:   Patient presents with:  Wellness Visit: medicare annual       1 yr. Ago.  Daughter  in sleep • Blood Gluc Meter Disp-Strips Does not apply Device Checks Q12hrs.  Code: E11.65 100 Device 11   • Albuterol Sulfate HFA (PROAIR HFA) 108 (90 Base) MCG/ACT Inhalation Aero Soln Inhale 2 puffs into the lungs every 4 (four) hours as needed for Wheezing or Sh • Insulin Pen Needle 32G X 4 MM Does not apply Misc INJECT TWICE A  each 3   • Blood Glucose Monitoring Suppl w/Device Does not apply Kit DX E 11.22. 1 kit 0   • acetaminophen (TYLENOL EXTRA STRENGTH) 500 MG Oral Tab Take 500 mg by mouth every 6 (si Spouse name: Not on file      Number of children: Not on file      Years of education: Not on file      Highest education level: Not on file    Occupational History      Occupation: Office work retired. Occupation: Used on work in [employer]. PHQ2 Score (Depression/Suicide Screening) - - - - -   PHQ4 Score - - - - -      Last eye exam needed. Checks feet nightly, no open sores     Hypertension  Patient is here for follow up of hypertension. BP at home: Not check.   Has been compliant with medi Has had swelling in the legs. Better when elevated in the morning. Worse in the evening. Has been standing now for a long day.         Review of Systems   Constitutional: Negative for activity change, appetite change, chills, diaphoresis, fatigue, fever Current Outpatient Medications   Medication Sig Dispense Refill   • Blood Gluc Meter Disp-Strips Does not apply Device Checks Q12hrs.  Code: E11.65 100 Device 11   • Albuterol Sulfate HFA (PROAIR HFA) 108 (90 Base) MCG/ACT Inhalation Aero Soln Inhale 2 puff • DICLOFENAC SODIUM 1 % Transdermal Gel APPLY 2 GRAMS TO AFFECTED AREA 4 TIMES A  g 2   • Insulin Pen Needle 32G X 4 MM Does not apply Misc INJECT TWICE A  each 3   • Blood Glucose Monitoring Suppl w/Device Does not apply Kit DX E 11.22. 1 ki heart attack    • Glaucoma Neg       Social History:   Social History    Tobacco Use      Smoking status: Former Smoker        Packs/day: 1.00      Smokeless tobacco: Never Used    Alcohol use: No      Alcohol/week: 0.0 standard drinks    Drug use: No Nose: Right sinus exhibits no maxillary sinus tenderness and no frontal sinus tenderness. Left sinus exhibits no maxillary sinus tenderness and no frontal sinus tenderness. Pt. wearing mask.    Did not have pt. remove mask due to current COVID virus situa She exhibits no tenderness. Right breast exhibits no inverted nipple, no mass, no nipple discharge, no skin change and no tenderness. Left breast exhibits no inverted nipple, no mass, no nipple discharge, no skin change and no tenderness.  No breast swellin Bilateral barefoot skin diabetic exam is normal, visualized feet and the appearance is normal except dry skin on feet B/L and thickened dystrophic nails all nails bilaterally.   Bilateral sensation of both feet is normal.  Pulsation pedal pulse exam of both Vitamin d deficiency Stable 5-20. Vitamin b 12 deficiency Check blood. Vaccine counseling Hold influ. Info. On shingirx. Check on insurance coverage. Uncontrolled type 2 diabetes mellitus with hyperglycemia (hcc) Not good control.   Before lunch She has Walking problems based on screening of functional status.    Difficulty walking?: Yes       Depression Screening (PHQ-2/PHQ-9): Over the LAST 2 WEEKS   Little interest or pleasure in doing things: Not at all  Feeling down, depressed, or hopeless: No Breast cancer screening     Uncontrolled type 2 diabetes mellitus with hyperglycemia (HCC)     Vitamin B 12 deficiency     Combined hyperlipidemia     Anatomical narrow angle glaucoma with borderline intraocular pressure     Tear film insufficiency •  atorvastatin 40 MG Oral Tab, Take 1 tablet (40 mg total) by mouth nightly. •  nitroGLYCERIN 0.4 MG Sublingual SL Tab, Place 1 tablet (0.4 mg total) under the tongue every 5 (five) minutes as needed for Chest pain.     •  Budesonide-Formoterol Fumarate •  Glucose Blood In Vitro Strip, Checks QAM ICD 10 Diagnosis Code:  E11.9. Patient using Prodigy strips.     •  COMFORT EZ PEN NEEDLES 31G X 6 MM Does not apply Misc, q12    •  Clobetasol Propionate 0.05 % External Ointment, Apply 1 Application topically 2 I have a problem hearing over the telephone: No I have trouble following the conversations when two or more people are talking at the same time: No   I have trouble understanding things on the TV: No I have to strain to understand conversations: No   I hav Acute on chronic diastolic congestive heart failure (HCC)    Age-related nuclear cataract of both eyes    Age-related osteoporosis without current pathological fracture  -     XR DEXA BONE DENSITOMETRY (CPT=77080);  Future    Anatomical narrow angle glaucom In the past six months, have you lost more than 10 pounds without trying?: 2 - No  Has your appetite been poor?: No  How does the patient maintain a good energy level?: Other  How would you describe your current health state?: Fair  How do you maintain pos Mammogram Annually to 78, then as discussed Mammogram due on 05/12/2021 Update Health Maintenance if applicable     Immunizations (Update Immunization Activity if applicable)     Influenza  Covered Annually  Please get every year    Pneumococcal 13 (Prevn No flowsheet data found. Dilated Eye exam  Annually Data entered on: 6/13/2017   Last Dilated Eye Exam 4/27/2017     No flowsheet data found. COPD      Spirometry Testing Annually Spirometry date:  No flowsheet data found.

## 2021-02-11 NOTE — PATIENT INSTRUCTIONS
ASSESSMENT/PLAN:   Acute on chronic diastolic congestive heart failure (hcc)  (primary encounter diagnosis) Stable. ACE inhibitor and beta-blocker.     Age-related nuclear cataract of both eyes    Age-related osteoporosis without current pathological fract dinner or with dinner. Call in 2 weeks with sugars. Careful with diet and excercise at least 30 minutes 3-4 times a week. Check sugars at different times on different dates. Careful with low sugars. Carry something with you and check sugar if can.  Can keen Age 72 years or older   • History of gestational diabetes or birth of baby weighing more than 9 pounds     Covered at least every 3 years,         Glucose (mg/dL)   Date Value   12/08/2020 200    Medicare covers annually or at 6-month intervals for prediab screening   Covered every 2 yrs after age 72    Covered yearly for Long term Glucocorticoid medication (Steroids) Requires diagnosis related to osteoporosis or estrogen deficiency.    Biennial benefit unless patient has history of long-term glucocorticoid u prescription benefits, but Medicare does not cover unless Medically needed    Zoster (Not covered by Medicare Part B) No orders found for this or any previous visit.  This may be covered with your pharmacy  prescription benefits     Recommended Websites for chills. · A red rash with small blisters appears in a few days. The rash may look as follows:   ? The blisters can occur anywhere, but they’re most common on the back, chest, or belly (abdomen).   ? They usually appear on only one side of the body, spreadi after age [address] [facility]. It' is nerve pain at the place where the rash used to be. It can range from mild to severe. It can last for only a few days, or for months or even years after you have had shingles.  Antiviral medicines given during the first 72 hours of the r zoster vaccine (Taz Juan Diego). This is a newer vaccine that has been available since 2017. You should get the shingles vaccine if you are healthy and age 48 or older, even if you've had shingles in the past. Two shots of the RZV vaccine are recommended.  You should rash, itching or hives, swelling of the face, lips, or tongue  · breathing problems  Side effects that usually do not require medical attention (report these to your doctor or health care professional if they continue or are bothersome):  · chills  · heada

## 2021-02-11 NOTE — TELEPHONE ENCOUNTER
Pharmacy is calling to request clarification on Blood Gluc Meter Disp-Strips Does not apply Device     Does the patient need just strips or strips and meter? Pharmacy states they do not have a record of patient receiving meter in the past 2 years so they are really unsure what is needed.  Please reach out and clarify

## 2021-02-12 RX ORDER — AMLODIPINE BESYLATE 5 MG/1
TABLET ORAL
Qty: 90 TABLET | Refills: 1 | Status: SHIPPED | OUTPATIENT
Start: 2021-02-12 | End: 2021-08-06

## 2021-02-12 RX ORDER — BUDESONIDE AND FORMOTEROL FUMARATE DIHYDRATE 160; 4.5 UG/1; UG/1
1 AEROSOL RESPIRATORY (INHALATION) 2 TIMES DAILY
Qty: 3 INHALER | Refills: 1 | Status: SHIPPED | OUTPATIENT
Start: 2021-02-12 | End: 2021-08-06

## 2021-02-12 RX ORDER — BLOOD SUGAR DIAGNOSTIC
STRIP MISCELLANEOUS
Qty: 50 STRIP | Refills: 11 | Status: SHIPPED | OUTPATIENT
Start: 2021-02-12 | End: 2022-07-11

## 2021-02-12 NOTE — TELEPHONE ENCOUNTER
Pharmacy stated they received strips and glucose machine but patient is missing lancets. Pharmacy is requesting a script.

## 2021-02-14 RX ORDER — LANCETS 30 GAUGE
EACH MISCELLANEOUS
Qty: 50 EACH | Refills: 11 | Status: SHIPPED | OUTPATIENT
Start: 2021-02-14 | End: 2022-07-11

## 2021-03-01 ENCOUNTER — TELEPHONE (OUTPATIENT)
Dept: INTERNAL MEDICINE CLINIC | Facility: CLINIC | Age: 71
End: 2021-03-01

## 2021-03-01 NOTE — TELEPHONE ENCOUNTER
Scheduled with CHANDLER Alvarez tomorrow at 12:00 pm for a virtual.  States she is having a COVID vaccination at 1:00 pm.

## 2021-03-01 NOTE — TELEPHONE ENCOUNTER
Spoke with Pt. States she took 2 puffs of proair about an hour ago. States she feels fine. Pt was not in distress while talking, denies shortness of breath. Pt requesting for nebulizer medication. Please advise.     Maria Isabel Gale responded via [employer], unable to add

## 2021-03-01 NOTE — TELEPHONE ENCOUNTER
Shows her history of CAD , Does she need to go to ER???  2. Can she come in and see Katherine Ruvalcaba? ?

## 2021-03-01 NOTE — TELEPHONE ENCOUNTER
Last office visit was 2/11/21.      Reported short of breath when walking from bed to the bathroom, or going to the couch or car ,will take couple of minutes to recover, on Symbicort  daily and pro air as needed, little  swollen left foot,  urinates good am

## 2021-03-02 ENCOUNTER — TELEMEDICINE (OUTPATIENT)
Dept: INTERNAL MEDICINE CLINIC | Facility: CLINIC | Age: 71
End: 2021-03-02
Payer: MEDICARE

## 2021-03-02 DIAGNOSIS — J45.901 ASTHMA WITH COPD WITH EXACERBATION (HCC): ICD-10-CM

## 2021-03-02 DIAGNOSIS — I25.119 CORONARY ARTERY DISEASE INVOLVING NATIVE CORONARY ARTERY OF NATIVE HEART WITH ANGINA PECTORIS (HCC): Primary | ICD-10-CM

## 2021-03-02 DIAGNOSIS — J44.1 ASTHMA WITH COPD WITH EXACERBATION (HCC): ICD-10-CM

## 2021-03-02 PROCEDURE — 99442 PHONE E/M BY PHYS 11-20 MIN: CPT | Performed by: NURSE PRACTITIONER

## 2021-03-02 NOTE — PATIENT INSTRUCTIONS
Diagnoses and all orders for this visit:    Coronary artery disease involving native coronary artery of native heart with angina pectoris [facility])  Follow up with cardiology  Start cardiac rehab    Asthma with COPD with exacerbation [facility])  Discussed taking triggers. Even when the lining of your airways are swollen or inflamed, you may not feel symptoms. When you do feel symptoms, your airways may be extra inflamed. You may even have an asthma attack.  This is when your airways close so tightly that air can’ If you have asthma, refer to your Asthma Action Plan to control your condition. · Quitting smoking. If you smoke, quitting is one of the best things you can do for your health. Quitting can help make your symptoms better.  This include not using e-cigarett stopping smoking, talk with your healthcare provider. Don't use e-cigarettes or vaping products. · Stay away from secondhand smoke and other irritants.  Try to stay away from smoke, chemicals, fumes, pollen, and dust. Don’t let anyone smoke in your home or of breath worse. People with COPD who have a low body mass index (BMI) may have more problems. Work with your provider to find out the best weight for you. · Balance activity and rest. This can help you from getting overtired.  Stop and rest before you fee all times. Quick-relief medicines:  · Are inhaled when needed and only when needed. Use your quick-relief medicine when you first notice your asthma is getting worse. · Start to open the airways within a few minutes after you use them.   · Can help stop a do.  · Refill your prescriptions on time, or even ahead of time, so you don’t run out. · Carry your quick-relief medicine with you. If you can, keep a spare quick-relief inhaler at work, at school, or in your gym bag.   · When you travel, make sure you hav

## 2021-03-02 NOTE — PROGRESS NOTES
Virtual Telephone Check-In    Simi Gilbert verbally consents to a Virtual/Telephone Check-In visit on 03/02/21. Patient understands and accepts financial responsibility for any deductible, co-insurance and/or co-pays associated with this service.     Brian Disp: 1 Inhaler, Rfl: 1  •  lisinopril 5 MG Oral Tab, Take 1 tablet (5 mg total) by mouth daily. , Disp: 90 tablet, Rfl: 0  •  metoprolol Tartrate 25 MG Oral Tab, Take 1 tablet (25 mg total) by mouth 2 (two) times daily. , Disp: 60 tablet, Rfl: 2  •  Prasugr Pen-injector, INJECT 52 UNITS SUBCUTANEOUSLY EVERY 12 HOURS, Disp: 40 pen, Rfl: 1  •  Lancet Devices (SIMPLE DIAGNOSTICS LANCING DEV) Does not apply Misc, Checks sugar twice a day Dx E11.65, Z79.4, Disp: 100 each, Rfl: 6  •  Betamethasone Dipropionate 0.05 visit:    Coronary artery disease involving native coronary artery of native heart with angina pectoris ([address]  Follow up with cardiology  Start cardiac rehab    Asthma with COPD with exacerbation ([facility]  Discussed taking Symbicort 1 puff BID everyday   Marty

## 2021-03-11 RX ORDER — PEN NEEDLE, DIABETIC 32GX 5/32"
NEEDLE, DISPOSABLE MISCELLANEOUS
Qty: 100 EACH | Refills: 5 | Status: SHIPPED | OUTPATIENT
Start: 2021-03-11 | End: 2022-01-31

## 2021-04-09 ENCOUNTER — TELEPHONE (OUTPATIENT)
Dept: INTERNAL MEDICINE CLINIC | Facility: CLINIC | Age: 71
End: 2021-04-09

## 2021-04-09 NOTE — TELEPHONE ENCOUNTER
[facility] [address] [facility] [employer] - [facility] [place] - [address], [phone]   Outpatient Medication Detail     Disp Refills Start End    atorvastatin 40 MG Oral Tab 90 tablet 1 1/5/2021     Sig - Route:  Take 1 tablet (40 mg total) by mouth night

## 2021-04-09 NOTE — TELEPHONE ENCOUNTER
atorvastatin 40 MG Oral Tab    Patient need a refill on medication she states she called the pharmacy and they said she need a new prescription

## 2021-04-19 RX ORDER — LISINOPRIL 5 MG/1
5 TABLET ORAL DAILY
Qty: 90 TABLET | Refills: 0 | Status: SHIPPED | OUTPATIENT
Start: 2021-04-19 | End: 2021-07-13

## 2021-04-19 RX ORDER — PRASUGREL 10 MG/1
TABLET, FILM COATED ORAL
Qty: 90 TABLET | Refills: 0 | OUTPATIENT
Start: 2021-04-19

## 2021-04-19 NOTE — TELEPHONE ENCOUNTER
Please see message. Effient or prasugrel thru cardiology that will be needing to refill this medication.

## 2021-04-20 ENCOUNTER — TELEPHONE (OUTPATIENT)
Dept: CARDIOLOGY | Age: 71
End: 2021-04-20

## 2021-04-20 RX ORDER — PRASUGREL 10 MG/1
10 TABLET, FILM COATED ORAL DAILY
Qty: 90 TABLET | Refills: 0 | Status: SHIPPED | OUTPATIENT
Start: 2021-04-20

## 2021-04-26 ENCOUNTER — NURSE TRIAGE (OUTPATIENT)
Dept: INTERNAL MEDICINE CLINIC | Facility: CLINIC | Age: 71
End: 2021-04-26

## 2021-04-26 NOTE — TELEPHONE ENCOUNTER
Action Requested: Summary for Provider     []  Critical Lab, Recommendations Needed  [] Need Additional Advice  []   FYI    []   Need Orders  [] Need Medications Sent to Pharmacy  []  Other     SUMMARY: Patient calling to advise that she has tested +Covid

## 2021-06-01 RX ORDER — INSULIN GLARGINE 100 [IU]/ML
55 INJECTION, SOLUTION SUBCUTANEOUS 2 TIMES DAILY
Qty: 35 EACH | Refills: 1 | Status: SHIPPED | OUTPATIENT
Start: 2021-06-01 | End: 2022-01-09

## 2021-06-07 ENCOUNTER — TELEPHONE (OUTPATIENT)
Dept: INTERNAL MEDICINE CLINIC | Facility: CLINIC | Age: 71
End: 2021-06-07

## 2021-06-07 NOTE — TELEPHONE ENCOUNTER
Patient calling back, states that she has not seen the eye specialist yet for her diabetes eye exam, BUT will going to call them and will make an appointment per patient . No future appointments.

## 2021-06-07 NOTE — TELEPHONE ENCOUNTER
Message left for pt to let us know if she had a recent diabetic eye exam, pt informed to let us know where she went and if not to please schedule appt.

## 2021-07-13 ENCOUNTER — TELEPHONE (OUTPATIENT)
Dept: INTERNAL MEDICINE CLINIC | Facility: CLINIC | Age: 71
End: 2021-07-13

## 2021-07-13 RX ORDER — ATORVASTATIN CALCIUM 40 MG/1
40 TABLET, FILM COATED ORAL NIGHTLY
Qty: 90 TABLET | Refills: 1 | Status: SHIPPED | OUTPATIENT
Start: 2021-07-13 | End: 2022-01-17

## 2021-07-13 RX ORDER — LISINOPRIL 5 MG/1
TABLET ORAL
Qty: 90 TABLET | Refills: 0 | Status: SHIPPED | OUTPATIENT
Start: 2021-07-13 | End: 2021-10-06

## 2021-07-13 NOTE — TELEPHONE ENCOUNTER
Spoke with patient to informed her that she is due for her eye exam. She states she has not done it yet. Told her to when she goes to please let them know to fax records to us.

## 2021-07-13 NOTE — TELEPHONE ENCOUNTER
•  atorvastatin 40 MG Oral Tab, Take 1 tablet (40 mg total) by mouth nightly., Disp: 90 tablet, Rfl: 1

## 2021-07-19 RX ORDER — PRASUGREL 10 MG/1
10 TABLET, FILM COATED ORAL DAILY
Qty: 90 TABLET | Refills: 1 | Status: SHIPPED | OUTPATIENT
Start: 2021-07-19

## 2021-07-19 NOTE — TELEPHONE ENCOUNTER
Dose verified.  Meets protocol, refill sent  Cbc from [facility] office from December 2020

## 2021-07-19 NOTE — TELEPHONE ENCOUNTER
Spoke with pt,  verified. Pt informed of below recommendation, pt stated she will just go to [facility] for yearly eye exam.   Pt was advised to make sure they are in her network, she stated understanding.        VAN

## 2021-07-19 NOTE — TELEPHONE ENCOUNTER
•  Prasugrel HCl 10 MG Oral Tab, Take 1 tablet (10 mg total) by mouth daily. , Disp: 90 tablet, Rfl: 0

## 2021-08-06 ENCOUNTER — TELEPHONE (OUTPATIENT)
Dept: ADMINISTRATIVE | Age: 71
End: 2021-08-06

## 2021-08-06 DIAGNOSIS — E11.9 DIABETIC EYE EXAM (HCC): Primary | ICD-10-CM

## 2021-08-06 DIAGNOSIS — Z01.00 DIABETIC EYE EXAM (HCC): Primary | ICD-10-CM

## 2021-08-06 DIAGNOSIS — E11.65 UNCONTROLLED TYPE 2 DIABETES MELLITUS WITH HYPERGLYCEMIA (HCC): ICD-10-CM

## 2021-08-06 RX ORDER — AMLODIPINE BESYLATE 5 MG/1
5 TABLET ORAL DAILY
Qty: 90 TABLET | Refills: 1 | Status: SHIPPED | OUTPATIENT
Start: 2021-08-06 | End: 2022-01-27

## 2021-08-06 RX ORDER — BUDESONIDE AND FORMOTEROL FUMARATE DIHYDRATE 160; 4.5 UG/1; UG/1
1 AEROSOL RESPIRATORY (INHALATION) 2 TIMES DAILY
Qty: 3 EACH | Refills: 1 | Status: SHIPPED | OUTPATIENT
Start: 2021-08-06 | End: 2022-01-27

## 2021-08-06 NOTE — TELEPHONE ENCOUNTER
Dr. Black Nicolas,    Patient called requesting a referral to see   Dr. Theron Rooney for her vision related to diabetes. Please sign off if you agree with plan of care. Thank you.     Managed Care

## 2021-08-11 ENCOUNTER — TELEPHONE (OUTPATIENT)
Dept: ADMINISTRATIVE | Age: 71
End: 2021-08-11

## 2021-08-11 DIAGNOSIS — H53.9 VISION ABNORMALITIES: Primary | ICD-10-CM

## 2021-08-12 NOTE — TELEPHONE ENCOUNTER
From   Leoncio Iraheta To   Laureen So Sent and Delivered   8/11/2021 11:44 AM   Last Read in MyChart   8/11/2021  1:52 PM by Fern Cervantes

## 2021-08-19 ENCOUNTER — TELEPHONE (OUTPATIENT)
Dept: INTERNAL MEDICINE CLINIC | Facility: CLINIC | Age: 71
End: 2021-08-19

## 2021-08-19 PROBLEM — H25.13 AGE-RELATED NUCLEAR CATARACT OF BOTH EYES: Status: ACTIVE | Noted: 2021-08-19

## 2021-08-19 PROBLEM — H25.043 POSTERIOR SUBCAPSULAR POLAR AGE-RELATED CATARACT OF BOTH EYES: Status: ACTIVE | Noted: 2021-08-19

## 2021-08-19 PROBLEM — H25.013 CORTICAL AGE-RELATED CATARACT OF BOTH EYES: Status: ACTIVE | Noted: 2021-08-19

## 2021-08-24 ENCOUNTER — TELEPHONE (OUTPATIENT)
Dept: INTERNAL MEDICINE CLINIC | Facility: CLINIC | Age: 71
End: 2021-08-24

## 2021-08-24 NOTE — TELEPHONE ENCOUNTER
Called patient to let her know she needs to scheduled her appointment for pre op exam 1 week prior surgery. Transferred to [place] to schedule her.

## 2021-08-30 ENCOUNTER — OFFICE VISIT (OUTPATIENT)
Dept: INTERNAL MEDICINE CLINIC | Facility: CLINIC | Age: 71
End: 2021-08-30
Payer: MEDICARE

## 2021-08-30 VITALS
HEART RATE: 94 BPM | WEIGHT: 291.63 LBS | BODY MASS INDEX: 51.67 KG/M2 | SYSTOLIC BLOOD PRESSURE: 127 MMHG | OXYGEN SATURATION: 90 % | DIASTOLIC BLOOD PRESSURE: 84 MMHG | HEIGHT: 63 IN

## 2021-08-30 DIAGNOSIS — E11.65 UNCONTROLLED TYPE 2 DIABETES MELLITUS WITH HYPERGLYCEMIA (HCC): ICD-10-CM

## 2021-08-30 DIAGNOSIS — E78.2 COMBINED HYPERLIPIDEMIA: ICD-10-CM

## 2021-08-30 DIAGNOSIS — I10 HTN (HYPERTENSION), BENIGN: Primary | ICD-10-CM

## 2021-08-30 DIAGNOSIS — I25.700 CORONARY ARTERY DISEASE INVOLVING CORONARY BYPASS GRAFT OF NATIVE HEART WITH UNSTABLE ANGINA PECTORIS (HCC): ICD-10-CM

## 2021-08-30 DIAGNOSIS — I25.119 CORONARY ARTERY DISEASE INVOLVING NATIVE CORONARY ARTERY OF NATIVE HEART WITH ANGINA PECTORIS (HCC): ICD-10-CM

## 2021-08-30 DIAGNOSIS — J44.9 ASTHMA WITH COPD (CHRONIC OBSTRUCTIVE PULMONARY DISEASE) (HCC): Chronic | ICD-10-CM

## 2021-08-30 DIAGNOSIS — Z01.818 PREOP EXAMINATION: ICD-10-CM

## 2021-08-30 PROBLEM — F43.21 GRIEVING: Status: RESOLVED | Noted: 2019-09-26 | Resolved: 2021-08-30

## 2021-08-30 PROBLEM — J44.1 ASTHMA WITH COPD WITH EXACERBATION (HCC): Status: RESOLVED | Noted: 2021-03-02 | Resolved: 2021-08-30

## 2021-08-30 PROBLEM — J45.901 ASTHMA WITH COPD WITH EXACERBATION (HCC): Status: RESOLVED | Noted: 2021-03-02 | Resolved: 2021-08-30

## 2021-08-30 PROBLEM — J45.901 ASTHMA WITH COPD WITH EXACERBATION: Status: RESOLVED | Noted: 2021-03-02 | Resolved: 2021-08-30

## 2021-08-30 PROBLEM — J44.1 ASTHMA WITH COPD WITH EXACERBATION  (HCC): Status: RESOLVED | Noted: 2021-03-02 | Resolved: 2021-08-30

## 2021-08-30 PROBLEM — J45.901 ASTHMA WITH COPD WITH EXACERBATION  (HCC): Status: RESOLVED | Noted: 2021-03-02 | Resolved: 2021-08-30

## 2021-08-30 PROBLEM — J44.1 ASTHMA WITH COPD WITH EXACERBATION: Status: RESOLVED | Noted: 2021-03-02 | Resolved: 2021-08-30

## 2021-08-30 PROCEDURE — 3079F DIAST BP 80-89 MM HG: CPT | Performed by: NURSE PRACTITIONER

## 2021-08-30 PROCEDURE — 3074F SYST BP LT 130 MM HG: CPT | Performed by: NURSE PRACTITIONER

## 2021-08-30 PROCEDURE — 3008F BODY MASS INDEX DOCD: CPT | Performed by: NURSE PRACTITIONER

## 2021-08-30 PROCEDURE — 99214 OFFICE O/P EST MOD 30 MIN: CPT | Performed by: NURSE PRACTITIONER

## 2021-08-30 NOTE — PATIENT INSTRUCTIONS
Assessment:  Htn (hypertension), benign  (primary encounter diagnosis)  Careful with diet and excercise at least 30 minutes 3-4 times a week. Check blood pressures at different times on different days. Can purchase own blood pressure monitor.  If not, check to procedure.   Prasugrel, ASA, and furosemide to be discussed with cardiologist.        Plan   Orders:  Orders Placed This Encounter      URINALYSIS, AUTO, W/O SCOPE      CBC With Differential With Platelet      Comp Metabolic Panel (14)    Medications yoli of salt you consume. At home, flavor your foods with other spices and herbs instead of salt. · Managing calories. A calorie is a unit of energy.  Your body burns calories for fuel, but if you eat more calories than your body burns, the extras are stored as sure to pay attention to food labels on packaged foods. Look for products that are high in fiber and protein, and low in saturated fat, added sugars, and sodium. Avoid products that contain trans fat. And pay close attention to serving size.  For instance, provider before starting an exercise program. This is especially important if you have not been active for a while. It's also important if you have a long-term (chronic) health problem such as heart disease, diabetes, or obesity.  Or if you are at high risk whole day. Walk instead of drive. Or park further away so that you take more steps each day. Do more household tasks or yard work. You may not be able to meet the advised mount of physical activity.  But doing some moderate- or vigorous-intensity aerobic ac

## 2021-08-30 NOTE — PROGRESS NOTES
Teresa Clarke is a 78year old female who presents for a pre-operative physical exam. Patient is to have Cataract surgery, to be done by Dr. Ellen Khan at [facility] on R eye 9/7/2021 & L eye 9/14/2021. Pt has had previous anesthesia:  Yes. Previous complications:   Harjeet Acevedo Encounters:  08/30/21 : 127/84  02/11/21 : 133/83  12/18/20 : 128/62    Labs:   Lab Results   Component Value Date/Time     (H) 08/31/2021 11:45 AM     08/31/2021 11:45 AM    K 3.8 08/31/2021 11:45 AM     08/31/2021 11:45 AM    CO2 21.0 Device 11   • Albuterol Sulfate HFA (PROAIR HFA) 108 (90 Base) MCG/ACT Inhalation Aero Soln Inhale 2 puffs into the lungs every 4 (four) hours as needed for Wheezing or Shortness of Breath.  1 Inhaler 1   • nitroGLYCERIN 0.4 MG Sublingual SL Tab Place 1 tab Date   • Asthma    • Asthma with COPD with exacerbation ([facility] 3/2/2021   • COPD (chronic obstructive pulmonary disease) ([facility]    • Coronary atherosclerosis    • Diabetes ([facility]    • Grieving 9/26/2019   • High blood pressure    • High cholesterol       Past lb 9.6 oz (132.3 kg)   SpO2 90%   BMI 51.65 kg/m²  Body mass index is 51.65 kg/m².   GENERAL: well developed, well nourished,in no apparent distress  SKIN: no rashes,no suspicious lesions  HEENT: atraumatic, normocephalic,ears and throat are clear  EYES:PER (hcc)  Preop examination     Pt has significant history of cardiac or pulmonary conditions. Pt may proceed to surgery without recommendation for any further evaluation / risk stratification.   Medical conditions are optimized for surgery and perioperativ the procedure with a small sip of water. Continue with Symbicort  Take Lantus 1/2 dose 27 units day before procedure  Hold Metformin the morning of procedure. Hold atorvastatin day before procedure.   Hold herbal medications and vitamins 7 days prior to p

## 2021-08-31 ENCOUNTER — EKG ENCOUNTER (OUTPATIENT)
Dept: LAB | Age: 71
End: 2021-08-31
Attending: NURSE PRACTITIONER
Payer: MEDICARE

## 2021-08-31 ENCOUNTER — LAB ENCOUNTER (OUTPATIENT)
Dept: LAB | Age: 71
End: 2021-08-31
Attending: NURSE PRACTITIONER
Payer: MEDICARE

## 2021-08-31 DIAGNOSIS — Z01.818 PREOP EXAMINATION: ICD-10-CM

## 2021-08-31 LAB
ALBUMIN SERPL-MCNC: 3.9 G/DL (ref 3.4–5)
ALBUMIN/GLOB SERPL: 1 {RATIO} (ref 1–2)
ALP LIVER SERPL-CCNC: 82 U/L
ALT SERPL-CCNC: 13 U/L
ANION GAP SERPL CALC-SCNC: 12 MMOL/L (ref 0–18)
AST SERPL-CCNC: 10 U/L (ref 15–37)
BASOPHILS # BLD AUTO: 0.08 X10(3) UL (ref 0–0.2)
BASOPHILS NFR BLD AUTO: 0.7 %
BILIRUB SERPL-MCNC: 0.9 MG/DL (ref 0.1–2)
BUN BLD-MCNC: 17 MG/DL (ref 7–18)
BUN/CREAT SERPL: 16.8 (ref 10–20)
CALCIUM BLD-MCNC: 9.1 MG/DL (ref 8.5–10.1)
CHLORIDE SERPL-SCNC: 106 MMOL/L (ref 98–112)
CO2 SERPL-SCNC: 21 MMOL/L (ref 21–32)
CREAT BLD-MCNC: 1.01 MG/DL
DEPRECATED RDW RBC AUTO: 48.5 FL (ref 35.1–46.3)
EOSINOPHIL # BLD AUTO: 0.19 X10(3) UL (ref 0–0.7)
EOSINOPHIL NFR BLD AUTO: 1.6 %
ERYTHROCYTE [DISTWIDTH] IN BLOOD BY AUTOMATED COUNT: 20.2 % (ref 11–15)
GLOBULIN PLAS-MCNC: 3.8 G/DL (ref 2.8–4.4)
GLUCOSE BLD-MCNC: 161 MG/DL (ref 70–99)
HCT VFR BLD AUTO: 37.3 %
HGB BLD-MCNC: 10.2 G/DL
IMM GRANULOCYTES # BLD AUTO: 0.07 X10(3) UL (ref 0–1)
IMM GRANULOCYTES NFR BLD: 0.6 %
LYMPHOCYTES # BLD AUTO: 2.38 X10(3) UL (ref 1–4)
LYMPHOCYTES NFR BLD AUTO: 19.9 %
M PROTEIN MFR SERPL ELPH: 7.7 G/DL (ref 6.4–8.2)
MCH RBC QN AUTO: 19 PG (ref 26–34)
MCHC RBC AUTO-ENTMCNC: 27.3 G/DL (ref 31–37)
MCV RBC AUTO: 69.6 FL
MONOCYTES # BLD AUTO: 0.68 X10(3) UL (ref 0.1–1)
MONOCYTES NFR BLD AUTO: 5.7 %
NEUTROPHILS # BLD AUTO: 8.55 X10 (3) UL (ref 1.5–7.7)
NEUTROPHILS # BLD AUTO: 8.55 X10(3) UL (ref 1.5–7.7)
NEUTROPHILS NFR BLD AUTO: 71.5 %
OSMOLALITY SERPL CALC.SUM OF ELEC: 293 MOSM/KG (ref 275–295)
PATIENT FASTING Y/N/NP: YES
PLATELET # BLD AUTO: 335 10(3)UL (ref 150–450)
POTASSIUM SERPL-SCNC: 3.8 MMOL/L (ref 3.5–5.1)
RBC # BLD AUTO: 5.36 X10(6)UL
SODIUM SERPL-SCNC: 139 MMOL/L (ref 136–145)
WBC # BLD AUTO: 12 X10(3) UL (ref 4–11)

## 2021-08-31 PROCEDURE — 85025 COMPLETE CBC W/AUTO DIFF WBC: CPT | Performed by: NURSE PRACTITIONER

## 2021-08-31 PROCEDURE — 36415 COLL VENOUS BLD VENIPUNCTURE: CPT | Performed by: NURSE PRACTITIONER

## 2021-08-31 PROCEDURE — 93005 ELECTROCARDIOGRAM TRACING: CPT

## 2021-08-31 PROCEDURE — 93010 ELECTROCARDIOGRAM REPORT: CPT | Performed by: NURSE PRACTITIONER

## 2021-08-31 PROCEDURE — 80053 COMPREHEN METABOLIC PANEL: CPT | Performed by: NURSE PRACTITIONER

## 2021-08-31 PROCEDURE — 85060 BLOOD SMEAR INTERPRETATION: CPT | Performed by: NURSE PRACTITIONER

## 2021-09-03 ENCOUNTER — LAB ENCOUNTER (OUTPATIENT)
Dept: LAB | Age: 71
End: 2021-09-03
Attending: INTERNAL MEDICINE
Payer: MEDICARE

## 2021-09-03 ENCOUNTER — OFFICE VISIT (OUTPATIENT)
Dept: CARDIOLOGY CLINIC | Facility: CLINIC | Age: 71
End: 2021-09-03
Payer: MEDICARE

## 2021-09-03 VITALS
HEART RATE: 87 BPM | BODY MASS INDEX: 51.56 KG/M2 | HEIGHT: 63 IN | OXYGEN SATURATION: 91 % | DIASTOLIC BLOOD PRESSURE: 84 MMHG | SYSTOLIC BLOOD PRESSURE: 145 MMHG | RESPIRATION RATE: 24 BRPM | WEIGHT: 291 LBS

## 2021-09-03 DIAGNOSIS — E11.65 UNCONTROLLED TYPE 2 DIABETES MELLITUS WITH HYPERGLYCEMIA (HCC): ICD-10-CM

## 2021-09-03 DIAGNOSIS — D64.9 ANEMIA, UNSPECIFIED TYPE: ICD-10-CM

## 2021-09-03 DIAGNOSIS — I25.10 CORONARY ARTERY DISEASE INVOLVING NATIVE CORONARY ARTERY OF NATIVE HEART WITHOUT ANGINA PECTORIS: Primary | ICD-10-CM

## 2021-09-03 DIAGNOSIS — R82.90 ABNORMAL URINE: ICD-10-CM

## 2021-09-03 DIAGNOSIS — R80.9 PROTEINURIA, UNSPECIFIED TYPE: ICD-10-CM

## 2021-09-03 DIAGNOSIS — E53.8 VITAMIN B 12 DEFICIENCY: ICD-10-CM

## 2021-09-03 LAB
ANION GAP SERPL CALC-SCNC: 14 MMOL/L (ref 0–18)
BASOPHILS # BLD AUTO: 0.07 X10(3) UL (ref 0–0.2)
BASOPHILS NFR BLD AUTO: 0.6 %
BILIRUB UR QL: NEGATIVE
BUN BLD-MCNC: 18 MG/DL (ref 7–18)
BUN/CREAT SERPL: 16.2 (ref 10–20)
CALCIUM BLD-MCNC: 9.3 MG/DL (ref 8.5–10.1)
CHLORIDE SERPL-SCNC: 106 MMOL/L (ref 98–112)
CHOLEST SMN-MCNC: 100 MG/DL (ref ?–200)
CO2 SERPL-SCNC: 19 MMOL/L (ref 21–32)
COLOR UR: YELLOW
CREAT BLD-MCNC: 1.11 MG/DL
DEPRECATED HBV CORE AB SER IA-ACNC: 6.4 NG/ML
DEPRECATED RDW RBC AUTO: 47.8 FL (ref 35.1–46.3)
EOSINOPHIL # BLD AUTO: 0.17 X10(3) UL (ref 0–0.7)
EOSINOPHIL NFR BLD AUTO: 1.3 %
ERYTHROCYTE [DISTWIDTH] IN BLOOD BY AUTOMATED COUNT: 19.5 % (ref 11–15)
EST. AVERAGE GLUCOSE BLD GHB EST-MCNC: 180 MG/DL (ref 68–126)
GLUCOSE BLD-MCNC: 176 MG/DL (ref 70–99)
GLUCOSE UR-MCNC: NEGATIVE MG/DL
HBA1C MFR BLD HPLC: 7.9 % (ref ?–5.7)
HCT VFR BLD AUTO: 36.5 %
HDLC SERPL-MCNC: 34 MG/DL (ref 40–59)
HGB BLD-MCNC: 9.9 G/DL
HGB UR QL STRIP.AUTO: NEGATIVE
IMM GRANULOCYTES # BLD AUTO: 0.04 X10(3) UL (ref 0–1)
IMM GRANULOCYTES NFR BLD: 0.3 %
IRON SATURATION: 5 %
IRON SERPL-MCNC: 23 UG/DL
KETONES UR-MCNC: NEGATIVE MG/DL
LDLC SERPL CALC-MCNC: 46 MG/DL (ref ?–100)
LEUKOCYTE ESTERASE UR QL STRIP.AUTO: NEGATIVE
LYMPHOCYTES # BLD AUTO: 2.06 X10(3) UL (ref 1–4)
LYMPHOCYTES NFR BLD AUTO: 16.3 %
MCH RBC QN AUTO: 19.1 PG (ref 26–34)
MCHC RBC AUTO-ENTMCNC: 27.1 G/DL (ref 31–37)
MCV RBC AUTO: 70.3 FL
MONOCYTES # BLD AUTO: 0.66 X10(3) UL (ref 0.1–1)
MONOCYTES NFR BLD AUTO: 5.2 %
NEUTROPHILS # BLD AUTO: 9.61 X10 (3) UL (ref 1.5–7.7)
NEUTROPHILS # BLD AUTO: 9.61 X10(3) UL (ref 1.5–7.7)
NEUTROPHILS NFR BLD AUTO: 76.3 %
NITRITE UR QL STRIP.AUTO: POSITIVE
NONHDLC SERPL-MCNC: 66 MG/DL (ref ?–130)
OSMOLALITY SERPL CALC.SUM OF ELEC: 294 MOSM/KG (ref 275–295)
PATIENT FASTING Y/N/NP: YES
PATIENT FASTING Y/N/NP: YES
PH UR: 5 [PH] (ref 5–8)
PLATELET # BLD AUTO: 319 10(3)UL (ref 150–450)
POTASSIUM SERPL-SCNC: 4.3 MMOL/L (ref 3.5–5.1)
PROT UR-MCNC: >=500 MG/DL
RBC # BLD AUTO: 5.19 X10(6)UL
SODIUM SERPL-SCNC: 139 MMOL/L (ref 136–145)
SP GR UR STRIP: 1.02 (ref 1–1.03)
TOTAL IRON BINDING CAPACITY: 511 UG/DL (ref 240–450)
TRANSFERRIN SERPL-MCNC: 343 MG/DL (ref 200–360)
TRIGL SERPL-MCNC: 107 MG/DL (ref 30–149)
UROBILINOGEN UR STRIP-ACNC: <2
VIT B12 SERPL-MCNC: 995 PG/ML (ref 193–986)
VLDLC SERPL CALC-MCNC: 15 MG/DL (ref 0–30)
WBC # BLD AUTO: 12.6 X10(3) UL (ref 4–11)

## 2021-09-03 PROCEDURE — 80061 LIPID PANEL: CPT

## 2021-09-03 PROCEDURE — 82728 ASSAY OF FERRITIN: CPT

## 2021-09-03 PROCEDURE — 81001 URINALYSIS AUTO W/SCOPE: CPT

## 2021-09-03 PROCEDURE — 3008F BODY MASS INDEX DOCD: CPT | Performed by: NURSE PRACTITIONER

## 2021-09-03 PROCEDURE — 36415 COLL VENOUS BLD VENIPUNCTURE: CPT

## 2021-09-03 PROCEDURE — 99214 OFFICE O/P EST MOD 30 MIN: CPT | Performed by: NURSE PRACTITIONER

## 2021-09-03 PROCEDURE — 84466 ASSAY OF TRANSFERRIN: CPT

## 2021-09-03 PROCEDURE — 83036 HEMOGLOBIN GLYCOSYLATED A1C: CPT

## 2021-09-03 PROCEDURE — 87086 URINE CULTURE/COLONY COUNT: CPT

## 2021-09-03 PROCEDURE — 85025 COMPLETE CBC W/AUTO DIFF WBC: CPT

## 2021-09-03 PROCEDURE — 82607 VITAMIN B-12: CPT

## 2021-09-03 PROCEDURE — 3077F SYST BP >= 140 MM HG: CPT | Performed by: NURSE PRACTITIONER

## 2021-09-03 PROCEDURE — 80048 BASIC METABOLIC PNL TOTAL CA: CPT

## 2021-09-03 PROCEDURE — 87186 SC STD MICRODIL/AGAR DIL: CPT

## 2021-09-03 PROCEDURE — 3079F DIAST BP 80-89 MM HG: CPT | Performed by: NURSE PRACTITIONER

## 2021-09-03 PROCEDURE — 87077 CULTURE AEROBIC IDENTIFY: CPT

## 2021-09-03 PROCEDURE — 83540 ASSAY OF IRON: CPT

## 2021-09-03 RX ORDER — FUROSEMIDE 20 MG/1
20 TABLET ORAL 2 TIMES DAILY
Qty: 180 TABLET | Refills: 3 | Status: SHIPPED | OUTPATIENT
Start: 2021-09-03

## 2021-09-03 RX ORDER — NITROGLYCERIN 0.4 MG/1
0.4 TABLET SUBLINGUAL EVERY 5 MIN PRN
Qty: 25 TABLET | Refills: 3 | Status: SHIPPED | OUTPATIENT
Start: 2021-09-03

## 2021-09-03 NOTE — PATIENT INSTRUCTIONS
1. May have cataract surgery  2. Stress test in next month or so  3. Get records from [place] from [facility] last year  4.  See Dr. Ana James in 1-2 months

## 2021-09-03 NOTE — PROGRESS NOTES
Stevie Loredo is a 78year old female. Patient presents with: Follow - Up: CAD  Surgical/procedure Clearance: EKG  08/31/21  Dyspnea: Daily with activity  Edema: feet daily    HPI:   Patient comes in today for follow-up.  She sees Dr. Disha Arevalo she has history mouth nightly. 90 tablet 1   • insulin glargine (LANTUS SOLOSTAR) 100 UNIT/ML Subcutaneous Solution Pen-injector Inject 55 Units into the skin 2 (two) times daily.  35 each 1   • metFORMIN HCl 1000 MG Oral Tab Take 1 tablet (1,000 mg total) by mouth 2 (two) EZ PEN NEEDLES 31G X 6 MM Does not apply Misc q12 100 each 6   • Clobetasol Propionate 0.05 % External Ointment Apply 1 Application topically 2 (two) times daily. Not to be placed on anywhere on face.  30 g 1   • aspirin 81 MG Oral Tab Take 162 mg by mouth 0.4 MG Sublingual SL Tab    Other Relevant Orders    CARD NUC STRESS TEST LEXISCAN (GEU=[id number]/)          Patient is doing well from a cardiac standpoint. She has coronary disease with 6 bypass and stenting last year.   Currently not having any o

## 2021-09-04 ENCOUNTER — LAB REQUISITION (OUTPATIENT)
Dept: SURGERY | Age: 71
End: 2021-09-04
Payer: MEDICARE

## 2021-09-04 DIAGNOSIS — Z01.818 PREOP EXAMINATION: ICD-10-CM

## 2021-09-05 LAB — SARS-COV-2 RNA RESP QL NAA+PROBE: DETECTED

## 2021-09-07 ENCOUNTER — TELEPHONE (OUTPATIENT)
Dept: INTERNAL MEDICINE CLINIC | Facility: CLINIC | Age: 71
End: 2021-09-07

## 2021-09-07 NOTE — PROGRESS NOTES
Detailed message left for pt that she needs to start abx as soon as possible and to take with food, finish all of the abx drink more fluids, and to take a probiotic to prevent yeast infections and to wipe from front to back and to urinate after intercourse

## 2021-09-07 NOTE — TELEPHONE ENCOUNTER
Spoke to pt, regarding +Covid19 test. Pt educated and advised to have monoclonal infusion being immunocomprimse. Pt refused Monoclonal infusion. Denies fever, cough, sore throat, chest pain, N/V, Lost of taste or smell.  Denies sick contact or known [id number]

## 2021-09-08 NOTE — TELEPHONE ENCOUNTER
Home Monitoring Day 4 of 7. Call attempt. Left Voicemail to call back our office. Phone number provided with office hours. Immunocompromised with history of Asthma with COPD and Congestive Heart Failure. MyChart sent to patient.

## 2021-09-08 NOTE — TELEPHONE ENCOUNTER
Home Monitoring Day 4 of 7. What  was your temp today? \"I dont know, but I dont have a fever\"    How did you take your temp? No thermometer    What was your pulse ox today? \"No. I dont know what that is\"    Are you feeling short of breath today?

## 2021-09-09 NOTE — TELEPHONE ENCOUNTER
Home Monitoring Day 5 of 7. Scheduled for Call Back on Saturday as symptoms are stable for now. What  was your temp today? - cannot find thermometer. RN provided education on benefits of thermometer.      How did you take your temp?     without a the Emergency Rooms.

## 2021-09-11 NOTE — TELEPHONE ENCOUNTER
Home Monitoring Day 7 of 7. What  was your temp today? - no    How did you take your temp?    n/a    What was your pulse ox today? Not checking    Are you feeling short of breath today?   Breathing well    Is the shortness of breath better, the same, or

## 2021-09-16 NOTE — TELEPHONE ENCOUNTER
Please review; protocol failed or has no protocol    Requested Prescriptions   Pending Prescriptions Disp Refills    METFORMIN HCL 1000 MG Oral Tab [Pharmacy Med Name: METFORMIN HCL 1,000 MG TABLET] 180 tablet 1     Sig: TAKE 1 TABLET BY MOUTH TWICE A DAY WITH MEALS        Diabetes Medication Protocol Failed - 9/16/2021  1:14 AM        Failed - Last A1C < 7.5 and within past 6 months     Lab Results   Component Value Date    A1C 7.9 (H) 09/03/2021               Passed - Appointment in past 6 or next 3 months        Passed - GFR Non- > 50     Lab Results   Component Value Date    GFRNAA 51 (L) 09/03/2021                 Passed - GFR in the past 12 months               Future Appointments         Provider Department Appt Notes    In 1 month Erika Freeman MD; AT [address] [place] [address] Ophthalmology - [address] [place] [address] cataract eval., went to [facility] and was told she had cats both eyes, humana ins.     In 2 months Shaoib Díaz MD [address], [address] [facility] follow up             Recent Outpatient Visits              1 week ago Coronary artery disease involving native coronary artery of native heart without angina pectoris    [facility] [place]    Office Visit    2 weeks ago HTN (hypertension), benign    [address], [address], Anish Longoria APRN    Office Visit    6 months ago Coronary artery disease involving native coronary artery of native heart with angina pectoris [facility])    [address], [address], Anish Longoria APRN    Telemedicine    7 months ago Acute on chronic diastolic congestive heart failure [facility])    [address], [address], Aneta Kaminski MD    Office Visit    9 months ago Coronary artery disease involving native coronary artery of native heart without angina pectoris    [facility] [place]    Office Visit

## 2021-10-06 RX ORDER — LISINOPRIL 5 MG/1
5 TABLET ORAL DAILY
Qty: 90 TABLET | Refills: 1 | Status: SHIPPED | OUTPATIENT
Start: 2021-10-06 | End: 2022-03-31

## 2021-10-06 NOTE — TELEPHONE ENCOUNTER
Refill passed per [employer] protocol. Requested Prescriptions   Pending Prescriptions Disp Refills    LISINOPRIL 5 MG Oral Tab [Pharmacy Med Name: LISINOPRIL 5 MG TABLET] 90 tablet 0     Sig: TAKE 1 TABLET BY MOUTH EVERY DAY        Hypertensive Medications Protocol Passed - 10/6/2021 12:30 AM        Passed - CMP or BMP in past 12 months        Passed - Appointment in past 6 or next 3 months        Passed - GFR Non- > 50     Lab Results   Component Value Date    GFRNAA 51 (L) 09/03/2021                    METOPROLOL TARTRATE 25 MG Oral Tab [Pharmacy Med Name: METOPROLOL TARTRATE 25 MG TAB] 180 tablet 0     Sig: TAKE 1 TABLET BY MOUTH TWICE A DAY        Hypertensive Medications Protocol Passed - 10/6/2021 12:30 AM        Passed - CMP or BMP in past 12 months        Passed - Appointment in past 6 or next 3 months        Passed - GFR Non- > 50     Lab Results   Component Value Date    GFRNAA 51 (L) 09/03/2021                        Future Appointments         Provider Department Appt Notes    In 3 weeks Eze Tipton MD; AT [address] [place] [address] - [address] [place] [address] cataract eval., went to [facility] and was told she had cats both eyes, humana ins.     In 1 month Nasim Corey MD [employer], [facility] follow up            Recent Outpatient Visits              1 month ago Coronary artery disease involving native coronary artery of native heart without angina pectoris    [facility] [place]    Office Visit    1 month ago HTN (hypertension), benign    [employer], Glenn Mendez [place], APRN    Office Visit    7 months ago Coronary artery disease involving native coronary artery of native heart with angina pectoris [facility])    [employer], Glenn Mendez [place], [place]    Telemedicine    7 months ago Acute on chronic diastolic congestive heart failure ([address] [facility] Marino Morgan., MD    Office Visit    9 months ago Coronary artery disease involving native coronary artery of native heart without angina pectoris    [facility] [place] [facility] Maynor Kim, [place]    Office Visit

## 2021-10-06 NOTE — TELEPHONE ENCOUNTER
Refill passed per Em[facility] Gottlieb protocol. Requested Prescriptions   Pending Prescriptions Disp Refills    LISINOPRIL 5 MG Oral Tab [Pharmacy Med Name: LISINOPRIL 5 MG TABLET] 90 tablet 0     Sig: TAKE 1 TABLET BY MOUTH EVERY DAY        Hypertensive Medications Protocol Passed - 10/6/2021 12:30 AM        Passed - CMP or BMP in past 12 months        Passed - Appointment in past 6 or next 3 months        Passed - GFR Non- > 50     Lab Results   Component Value Date    GFRNAA 51 (L) 09/03/2021                    METOPROLOL TARTRATE 25 MG Oral Tab [Pharmacy Med Name: METOPROLOL TARTRATE 25 MG TAB] 180 tablet 0     Sig: TAKE 1 TABLET BY MOUTH TWICE A DAY        Hypertensive Medications Protocol Passed - 10/6/2021 12:30 AM        Passed - CMP or BMP in past 12 months        Passed - Appointment in past 6 or next 3 months        Passed - GFR Non- > 50     Lab Results   Component Value Date    GFRNAA 51 (L) 09/03/2021                        Future Appointments         Provider Department Appt Notes    In 3 weeks Gregg Herrera MD; AT [address] [place] [address] - [address] [place] [address], [place] cataract eval., went to [facility] and was told she had cats both eyes, humana ins.     In 1 month MD Emilee Anna, 59 [facility] follow up            Recent Outpatient Visits              1 month ago Coronary artery disease involving native coronary artery of native heart without angina pectoris    [facility] [place] [facility] Adi Dupont, [place]    Office Visit    1 month ago HTN (hypertension), benign    [facility] [place], [place], APRN    Office Visit    7 months ago Coronary artery disease involving native coronary artery of native heart with angina pectoris [facility])    [facility] [place], [place], [place]    Telemedicine    7 months ago Acute on chronic diastolic congestive heart failure ([facility] [address] [facility] Tiffanie Carcamo MD    Office Visit    9 months ago Coronary artery disease involving native coronary artery of native heart without angina pectoris    [facility] [place] [facility] [place]    Office Visit

## 2021-11-17 ENCOUNTER — TELEPHONE (OUTPATIENT)
Dept: INTERNAL MEDICINE CLINIC | Facility: CLINIC | Age: 71
End: 2021-11-17

## 2021-11-17 NOTE — TELEPHONE ENCOUNTER
Spoke to Alphonse pozo from [facility].  She explained that patient is in a chronic care management program through insurance and she just needed to verify if patient had the following diagnosis:    CHF, CAD, DM    Relayed that these three problems were on her proble

## 2021-11-22 ENCOUNTER — MED REC SCAN ONLY (OUTPATIENT)
Dept: INTERNAL MEDICINE CLINIC | Facility: CLINIC | Age: 71
End: 2021-11-22

## 2021-12-30 RX ORDER — ATORVASTATIN CALCIUM 40 MG/1
40 TABLET, FILM COATED ORAL NIGHTLY
Qty: 90 TABLET | Refills: 1 | OUTPATIENT
Start: 2021-12-30

## 2022-01-09 RX ORDER — INSULIN GLARGINE 100 [IU]/ML
55 INJECTION, SOLUTION SUBCUTANEOUS 2 TIMES DAILY
Qty: 42 EACH | Refills: 1 | Status: SHIPPED | OUTPATIENT
Start: 2022-01-09 | End: 2022-03-03

## 2022-01-17 RX ORDER — PRASUGREL 10 MG/1
10 TABLET, FILM COATED ORAL DAILY
Qty: 90 TABLET | Refills: 1 | OUTPATIENT
Start: 2022-01-17

## 2022-01-17 RX ORDER — ATORVASTATIN CALCIUM 40 MG/1
40 TABLET, FILM COATED ORAL NIGHTLY
Qty: 90 TABLET | Refills: 1 | Status: SHIPPED | OUTPATIENT
Start: 2022-01-17 | End: 2022-03-31

## 2022-01-27 RX ORDER — BUDESONIDE AND FORMOTEROL FUMARATE DIHYDRATE 160; 4.5 UG/1; UG/1
1 AEROSOL RESPIRATORY (INHALATION) 2 TIMES DAILY
Qty: 30.6 EACH | Refills: 5 | Status: SHIPPED | OUTPATIENT
Start: 2022-01-27 | End: 2023-09-19

## 2022-01-27 RX ORDER — AMLODIPINE BESYLATE 5 MG/1
TABLET ORAL
Qty: 90 TABLET | Refills: 1 | Status: SHIPPED | OUTPATIENT
Start: 2022-01-27 | End: 2022-03-31

## 2022-01-27 NOTE — TELEPHONE ENCOUNTER
Refill passed per [place] [employer] [facility] protocol.       Requested Prescriptions   Pending Prescriptions Disp Refills    SYMBICORT 160-4.5 MCG/ACT Inhalation Aerosol [Pharmacy Med Name: SYMBICORT 160-4.5 MCG INHALER] 30.6 each 1     Sig: TAKE 1 PUFF BY MOUTH TWICE A DAY        Asthma & COPD Medication Protocol Passed - 1/27/2022  1:39 AM        Passed - Appointment in past 6 or next 3 months           AMLODIPINE 5 MG Oral Tab [Pharmacy Med Name: AMLODIPINE BESYLATE 5 MG TAB] 90 tablet 1     Sig: TAKE 1 TABLET BY MOUTH EVERY DAY        Hypertensive Medications Protocol Passed - 1/27/2022  1:39 AM        Passed - CMP or BMP in past 12 months        Passed - Appointment in past 6 or next 3 months        Passed - GFR Non- > 50     Lab Results   Component Value Date    GFRNAA 51 (L) 09/03/2021                        Future Appointments         Provider Department Appt Notes    In 2 months Sylvester Garcias MD [place] [employer] [facility], [address], [facility] [place] VISIT             Recent Outpatient Visits              4 months ago Coronary artery disease involving native coronary artery of native heart without angina pectoris    [facility] [place]    Office Visit    5 months ago HTN (hypertension), benign    [facility], [address], Anish Longoria APRN    Office Visit    11 months ago Coronary artery disease involving native coronary artery of native heart with angina pectoris [facility])    [facility], [address], Anish Longoria APRN    Telemedicine    11 months ago Acute on chronic diastolic congestive heart failure [facility])    [facility], [address], Augustine Payne MD    Office Visit    1 year ago Coronary artery disease involving native coronary artery of native heart without angina pectoris    [facility] [place]    Office Visit

## 2022-01-27 NOTE — TELEPHONE ENCOUNTER
Refill passed per [address] protocol.       Requested Prescriptions   Pending Prescriptions Disp Refills    SYMBICORT 160-4.5 MCG/ACT Inhalation Aerosol [Pharmacy Med Name: SYMBICORT 160-4.5 MCG INHALER] 30.6 each 1     Sig: TAKE 1 PUFF BY MOUTH TWICE A DAY        Asthma & COPD Medication Protocol Passed - 1/27/2022  1:39 AM        Passed - Appointment in past 6 or next 3 months           AMLODIPINE 5 MG Oral Tab [Pharmacy Med Name: AMLODIPINE BESYLATE 5 MG TAB] 90 tablet 1     Sig: TAKE 1 TABLET BY MOUTH EVERY DAY        Hypertensive Medications Protocol Passed - 1/27/2022  1:39 AM        Passed - CMP or BMP in past 12 months        Passed - Appointment in past 6 or next 3 months        Passed - GFR Non- > 50     Lab Results   Component Value Date    GFRNAA 51 (L) 09/03/2021                        Future Appointments         Provider Department Appt Notes    In 2 months Hilario Soto MD [address], [address], [facility] VISIT             Recent Outpatient Visits              4 months ago Coronary artery disease involving native coronary artery of native heart without angina pectoris    [facility] Cardiology [place], APRN    Office Visit    5 months ago HTN (hypertension), benign    [address], [address], [address]ela [address], APRN    Office Visit    11 months ago Coronary artery disease involving native coronary artery of native heart with angina pectoris [facility])    [address], [address], Glenn[address]amela [address], APRN    Telemedicine    11 months ago Acute on chronic diastolic congestive heart failure [facility])    [address], [address], Abdirizak Chavez MD    Office Visit    1 year ago Coronary artery disease involving native coronary artery of native heart without angina pectoris    [address] [place] [address] Cardiology [place], APRN    Office Visit

## 2022-01-31 RX ORDER — PEN NEEDLE, DIABETIC 32GX 5/32"
NEEDLE, DISPOSABLE MISCELLANEOUS
Qty: 100 EACH | Refills: 5 | Status: SHIPPED | OUTPATIENT
Start: 2022-01-31

## 2022-01-31 NOTE — TELEPHONE ENCOUNTER
Refill passed per [facility] protocol.     Requested Prescriptions   Pending Prescriptions Disp Refills    BD PEN NEEDLE ALEKSANDER 2ND GEN 32G X 4 MM Does not apply Misc [Pharmacy Med Name: BD ALEKSANDER 2 GEN PEN [id number] [id number]]  5     Sig: INJECT TWICE A DAY

## 2022-03-02 NOTE — TELEPHONE ENCOUNTER
Protocol failed or has No Protocol, please review  Requested Prescriptions   Pending Prescriptions Disp Refills    LANTUS SOLOSTAR 100 UNIT/ML Subcutaneous Solution Pen-injector [Pharmacy Med Name: LANTUS SOLOSTAR 100 UNIT/ML]  1     Sig: Inject 55 Units into the skin 2 (two) times daily.         Diabetes Medication Protocol Failed - 2/27/2022  6:02 PM        Failed - Last A1C < 7.5 and within past 6 months     Lab Results   Component Value Date    A1C 7.9 (H) 09/03/2021               Passed - Appointment in past 6 or next 3 months        Passed - GFR Non- > 50     Lab Results   Component Value Date    GFRNAA 51 (L) 09/03/2021                 Passed - GFR in the past 12 months            Future Appointments         Provider Department Appt Notes    In 5 days CRISTIANE YIN RM1; ADO DEXA [facility] know    In 4 weeks Emilia Jarrett MD [facility], [facility] VISIT           Recent Outpatient Visits              6 months ago Coronary artery disease involving native coronary artery of native heart without angina pectoris    [facility], APR    Office Visit    6 months ago HTN (hypertension), benign    [place], [place], Jose Longoria APRN    Office Visit    1 year ago Coronary artery disease involving native coronary artery of native heart with angina pectoris [facility])    [place], [place], Jose Longoria APRN    Telemedicine    1 year ago Acute on chronic diastolic congestive heart failure [place], [place], Prisca Farfan MD    Office Visit    1 year ago Coronary artery disease involving native coronary artery of native heart without angina pectoris    [facility], APR    Office Visit

## 2022-03-03 RX ORDER — INSULIN GLARGINE 100 [IU]/ML
55 INJECTION, SOLUTION SUBCUTANEOUS 2 TIMES DAILY
Qty: 45 ML | Refills: 1 | Status: SHIPPED | OUTPATIENT
Start: 2022-03-03

## 2022-03-07 ENCOUNTER — HOSPITAL ENCOUNTER (OUTPATIENT)
Dept: BONE DENSITY | Age: 72
Discharge: HOME | End: 2022-03-07
Attending: INTERNAL MEDICINE
Payer: MEDICARE

## 2022-03-07 DIAGNOSIS — M81.0 AGE-RELATED OSTEOPOROSIS WITHOUT CURRENT PATHOLOGICAL FRACTURE: ICD-10-CM

## 2022-03-07 PROCEDURE — 77080 DXA BONE DENSITY AXIAL: CPT | Performed by: INTERNAL MEDICINE

## 2022-03-09 PROBLEM — M85.89 OSTEOPENIA OF MULTIPLE SITES: Status: ACTIVE | Noted: 2022-03-09

## 2022-03-17 NOTE — TELEPHONE ENCOUNTER
Refill passed per [address] [place] protocol.     Requested Prescriptions   Pending Prescriptions Disp Refills    METFORMIN HCL 1000 MG Oral Tab [Pharmacy Med Name: METFORMIN HCL 1,000 MG TABLET] 180 tablet 1     Sig: TAKE 1 TABLET BY MOUTH TWICE A DAY WITH MEALS        Diabetes Medication Protocol Failed - 3/17/2022  4:39 PM        Failed - Last A1C < 7.5 and within past 6 months     Lab Results   Component Value Date    A1C 7.9 (H) 09/03/2021               Passed - Appointment in past 6 or next 3 months        Passed - GFR Non- > 50     Lab Results   Component Value Date    GFRNAA 51 (L) 09/03/2021                 Passed - GFR in the past 12 months              Future Appointments         Provider Department Appt Notes    In 2 weeks Romel Marshall MD [address], [address], [facility] VISIT             Recent Outpatient Visits              6 months ago Coronary artery disease involving native coronary artery of native heart without angina pectoris    [facility] [place] Rios    Office Visit    6 months ago HTN (hypertension), benign    [address], [address], Anish Longoria, ROC    Office Visit    1 year ago Coronary artery disease involving native coronary artery of native heart with angina pectoris [facility])    [address], [address], Anish Longoria, APRN    Telemedicine    1 year ago Acute on chronic diastolic congestive heart failure [facility])    [address] [place], [address], Juvenal Gao MD    Office Visit    1 year ago Coronary artery disease involving native coronary artery of native heart without angina pectoris    [facility] SPECIALTY [facility]iology [place]    Office Visit

## 2022-03-31 ENCOUNTER — OFFICE VISIT (OUTPATIENT)
Dept: INTERNAL MEDICINE CLINIC | Facility: CLINIC | Age: 72
End: 2022-03-31
Payer: MEDICARE

## 2022-03-31 VITALS
WEIGHT: 290 LBS | HEART RATE: 93 BPM | OXYGEN SATURATION: 90 % | SYSTOLIC BLOOD PRESSURE: 126 MMHG | TEMPERATURE: 98 F | BODY MASS INDEX: 51.38 KG/M2 | DIASTOLIC BLOOD PRESSURE: 71 MMHG | HEIGHT: 63 IN

## 2022-03-31 DIAGNOSIS — Z12.31 ENCOUNTER FOR SCREENING MAMMOGRAM FOR MALIGNANT NEOPLASM OF BREAST: ICD-10-CM

## 2022-03-31 DIAGNOSIS — E53.8 VITAMIN B 12 DEFICIENCY: ICD-10-CM

## 2022-03-31 DIAGNOSIS — H52.03 HYPEROPIA OF BOTH EYES WITH ASTIGMATISM AND PRESBYOPIA: ICD-10-CM

## 2022-03-31 DIAGNOSIS — N18.32 STAGE 3B CHRONIC KIDNEY DISEASE (HCC): Chronic | ICD-10-CM

## 2022-03-31 DIAGNOSIS — B35.3 TINEA PEDIS OF BOTH FEET: ICD-10-CM

## 2022-03-31 DIAGNOSIS — E78.2 COMBINED HYPERLIPIDEMIA: ICD-10-CM

## 2022-03-31 DIAGNOSIS — E55.9 VITAMIN D DEFICIENCY: ICD-10-CM

## 2022-03-31 DIAGNOSIS — E66.01 OBESITY, CLASS III, BMI 40-49.9 (MORBID OBESITY) (HCC): ICD-10-CM

## 2022-03-31 DIAGNOSIS — H40.039 ANATOMICAL NARROW ANGLE GLAUCOMA WITH BORDERLINE INTRAOCULAR PRESSURE, UNSPECIFIED LATERALITY: ICD-10-CM

## 2022-03-31 DIAGNOSIS — J44.9 ASTHMA WITH COPD (CHRONIC OBSTRUCTIVE PULMONARY DISEASE) (HCC): Primary | Chronic | ICD-10-CM

## 2022-03-31 DIAGNOSIS — H52.4 HYPEROPIA OF BOTH EYES WITH ASTIGMATISM AND PRESBYOPIA: ICD-10-CM

## 2022-03-31 DIAGNOSIS — M85.89 OSTEOPENIA OF MULTIPLE SITES: ICD-10-CM

## 2022-03-31 DIAGNOSIS — I10 HTN (HYPERTENSION), BENIGN: ICD-10-CM

## 2022-03-31 DIAGNOSIS — R60.9 PERIPHERAL EDEMA: ICD-10-CM

## 2022-03-31 DIAGNOSIS — D23.9 DYSPLASTIC NEVI: ICD-10-CM

## 2022-03-31 DIAGNOSIS — M81.0 AGE-RELATED OSTEOPOROSIS WITHOUT CURRENT PATHOLOGICAL FRACTURE: ICD-10-CM

## 2022-03-31 DIAGNOSIS — H25.13 AGE-RELATED NUCLEAR CATARACT OF BOTH EYES: ICD-10-CM

## 2022-03-31 DIAGNOSIS — H52.203 HYPEROPIA OF BOTH EYES WITH ASTIGMATISM AND PRESBYOPIA: ICD-10-CM

## 2022-03-31 DIAGNOSIS — E04.2 MULTINODULAR GOITER (NONTOXIC): ICD-10-CM

## 2022-03-31 DIAGNOSIS — H25.013 CORTICAL AGE-RELATED CATARACT OF BOTH EYES: ICD-10-CM

## 2022-03-31 DIAGNOSIS — I50.33 ACUTE ON CHRONIC DIASTOLIC CONGESTIVE HEART FAILURE (HCC): ICD-10-CM

## 2022-03-31 DIAGNOSIS — H25.043 POSTERIOR SUBCAPSULAR POLAR AGE-RELATED CATARACT OF BOTH EYES: ICD-10-CM

## 2022-03-31 DIAGNOSIS — N28.89 RENAL MASS, LEFT: ICD-10-CM

## 2022-03-31 DIAGNOSIS — I25.700 CORONARY ARTERY DISEASE INVOLVING CORONARY BYPASS GRAFT OF NATIVE HEART WITH UNSTABLE ANGINA PECTORIS (HCC): ICD-10-CM

## 2022-03-31 DIAGNOSIS — E11.65 UNCONTROLLED TYPE 2 DIABETES MELLITUS WITH HYPERGLYCEMIA (HCC): ICD-10-CM

## 2022-03-31 DIAGNOSIS — H04.129 TEAR FILM INSUFFICIENCY, UNSPECIFIED LATERALITY: ICD-10-CM

## 2022-03-31 DIAGNOSIS — Z71.85 VACCINE COUNSELING: ICD-10-CM

## 2022-03-31 DIAGNOSIS — I25.119 CORONARY ARTERY DISEASE INVOLVING NATIVE CORONARY ARTERY OF NATIVE HEART WITH ANGINA PECTORIS (HCC): ICD-10-CM

## 2022-03-31 DIAGNOSIS — R06.00 DOE (DYSPNEA ON EXERTION): ICD-10-CM

## 2022-03-31 LAB
APPEARANCE: CLEAR
BILIRUBIN: NEGATIVE
GLUCOSE (URINE DIPSTICK): NEGATIVE MG/DL
KETONES (URINE DIPSTICK): NEGATIVE MG/DL
LEUKOCYTES: NEGATIVE
MULTISTIX LOT#: ABNORMAL NUMERIC
NITRITE, URINE: POSITIVE
OCCULT BLOOD: NEGATIVE
PH, URINE: 5.5 (ref 4.5–8)
PROTEIN (URINE DIPSTICK): 100 MG/DL
SPECIFIC GRAVITY: >=1.03 (ref 1–1.03)
URINE-COLOR: YELLOW
UROBILINOGEN,SEMI-QN: 0.2 MG/DL (ref 0–1.9)

## 2022-03-31 PROCEDURE — 3078F DIAST BP <80 MM HG: CPT | Performed by: INTERNAL MEDICINE

## 2022-03-31 PROCEDURE — 81003 URINALYSIS AUTO W/O SCOPE: CPT | Performed by: INTERNAL MEDICINE

## 2022-03-31 PROCEDURE — 3060F POS MICROALBUMINURIA REV: CPT | Performed by: INTERNAL MEDICINE

## 2022-03-31 PROCEDURE — 3008F BODY MASS INDEX DOCD: CPT | Performed by: INTERNAL MEDICINE

## 2022-03-31 PROCEDURE — 99397 PER PM REEVAL EST PAT 65+ YR: CPT | Performed by: INTERNAL MEDICINE

## 2022-03-31 PROCEDURE — G0439 PPPS, SUBSEQ VISIT: HCPCS | Performed by: INTERNAL MEDICINE

## 2022-03-31 PROCEDURE — 96160 PT-FOCUSED HLTH RISK ASSMT: CPT | Performed by: INTERNAL MEDICINE

## 2022-03-31 PROCEDURE — 3074F SYST BP LT 130 MM HG: CPT | Performed by: INTERNAL MEDICINE

## 2022-03-31 RX ORDER — AMLODIPINE BESYLATE 5 MG/1
5 TABLET ORAL DAILY
Qty: 90 TABLET | Refills: 1 | Status: SHIPPED | OUTPATIENT
Start: 2022-03-31

## 2022-03-31 RX ORDER — INSULIN GLARGINE 100 [IU]/ML
INJECTION, SOLUTION SUBCUTANEOUS
Qty: 36 EACH | Refills: 1 | Status: SHIPPED | OUTPATIENT
Start: 2022-03-31

## 2022-03-31 RX ORDER — LISINOPRIL 5 MG/1
5 TABLET ORAL DAILY
Qty: 90 TABLET | Refills: 1 | Status: SHIPPED | OUTPATIENT
Start: 2022-03-31

## 2022-03-31 RX ORDER — ATORVASTATIN CALCIUM 40 MG/1
40 TABLET, FILM COATED ORAL NIGHTLY
Qty: 90 TABLET | Refills: 1 | Status: SHIPPED | OUTPATIENT
Start: 2022-03-31

## 2022-04-01 ENCOUNTER — TELEPHONE (OUTPATIENT)
Dept: INTERNAL MEDICINE CLINIC | Facility: CLINIC | Age: 72
End: 2022-04-01

## 2022-04-01 ENCOUNTER — HOSPITAL ENCOUNTER (OUTPATIENT)
Dept: ULTRASOUND IMAGING | Facility: HOSPITAL | Age: 72
Discharge: HOME | End: 2022-04-01
Attending: INTERNAL MEDICINE
Payer: MEDICARE

## 2022-04-01 DIAGNOSIS — R60.9 PERIPHERAL EDEMA: ICD-10-CM

## 2022-04-01 LAB — CREATININE, RANDOM URINE: 110 MG/DL (ref 20–275)

## 2022-04-01 PROCEDURE — 93970 EXTREMITY STUDY: CPT | Performed by: INTERNAL MEDICINE

## 2022-04-01 NOTE — TELEPHONE ENCOUNTER
Order for stat ultrasound has been scheduled for pt venous doppler bilateral for legs, pt informed and given directions.

## 2022-04-02 ENCOUNTER — TELEPHONE (OUTPATIENT)
Dept: INTERNAL MEDICINE CLINIC | Facility: CLINIC | Age: 72
End: 2022-04-02

## 2022-04-02 NOTE — TELEPHONE ENCOUNTER
Dr. Kenzie Forbes - See urine Results - medications needed? RN called patient to discuss Ultrasound results. Patient's date of birth and full name both confirmed. She is also asking about what to do with Urine Culture results, if medication needed. Reports leg swelling has decreased with recent adjustment of \"water\" pills, advised by Dr. Kenzie Forbes. RN encouraged her to continue to elevate legs when sitting.

## 2022-04-03 LAB
BILIRUBIN: NEGATIVE
COLOR: YELLOW
GLUCOSE: NEGATIVE
KETONES: NEGATIVE
LEUKOCYTE ESTERASE: NEGATIVE
NITRITE: POSITIVE
OCCULT BLOOD: NEGATIVE
PH: 6.5 (ref 5–8)
SPECIFIC GRAVITY: 1.02 (ref 1–1.03)

## 2022-05-04 ENCOUNTER — HOSPITAL ENCOUNTER (OUTPATIENT)
Dept: CV DIAGNOSTICS | Facility: HOSPITAL | Age: 72
Discharge: HOME | End: 2022-05-04
Attending: INTERNAL MEDICINE
Payer: MEDICARE

## 2022-05-04 DIAGNOSIS — R60.9 PERIPHERAL EDEMA: ICD-10-CM

## 2022-05-04 DIAGNOSIS — R06.00 DOE (DYSPNEA ON EXERTION): ICD-10-CM

## 2022-05-04 PROCEDURE — 93306 TTE W/DOPPLER COMPLETE: CPT | Performed by: INTERNAL MEDICINE

## 2022-05-18 ENCOUNTER — MED REC SCAN ONLY (OUTPATIENT)
Dept: INTERNAL MEDICINE CLINIC | Facility: CLINIC | Age: 72
End: 2022-05-18

## 2022-05-23 ENCOUNTER — HOSPITAL ENCOUNTER (OUTPATIENT)
Dept: ULTRASOUND IMAGING | Age: 72
Discharge: HOME | End: 2022-05-23
Attending: INTERNAL MEDICINE
Payer: MEDICARE

## 2022-05-23 DIAGNOSIS — E04.2 MULTINODULAR GOITER (NONTOXIC): ICD-10-CM

## 2022-05-23 DIAGNOSIS — N28.89 RENAL MASS, LEFT: ICD-10-CM

## 2022-05-23 PROCEDURE — 76770 US EXAM ABDO BACK WALL COMP: CPT | Performed by: INTERNAL MEDICINE

## 2022-05-23 PROCEDURE — 76536 US EXAM OF HEAD AND NECK: CPT | Performed by: INTERNAL MEDICINE

## 2022-05-24 DIAGNOSIS — E04.1 THYROID NODULE: ICD-10-CM

## 2022-05-24 DIAGNOSIS — N28.89 RENAL MASS, LEFT: Primary | ICD-10-CM

## 2022-06-09 ENCOUNTER — HOSPITAL ENCOUNTER (OUTPATIENT)
Dept: CT IMAGING | Facility: HOSPITAL | Age: 72
Discharge: HOME | End: 2022-06-09
Attending: INTERNAL MEDICINE
Payer: MEDICARE

## 2022-06-09 DIAGNOSIS — N28.89 RENAL MASS, LEFT: ICD-10-CM

## 2022-06-09 LAB — CREAT BLD-MCNC: 0.7 MG/DL

## 2022-06-09 PROCEDURE — 74178 CT ABD&PLV WO CNTR FLWD CNTR: CPT | Performed by: INTERNAL MEDICINE

## 2022-06-09 PROCEDURE — 82565 ASSAY OF CREATININE: CPT

## 2022-06-12 PROBLEM — R16.0 HEPATOMEGALY: Status: ACTIVE | Noted: 2022-06-12

## 2022-07-09 PROBLEM — Z12.11 SCREEN FOR COLON CANCER: Status: ACTIVE | Noted: 2022-07-09

## 2022-07-11 ENCOUNTER — TELEPHONE (OUTPATIENT)
Dept: INTERNAL MEDICINE CLINIC | Facility: CLINIC | Age: 72
End: 2022-07-11

## 2022-07-11 ENCOUNTER — OFFICE VISIT (OUTPATIENT)
Dept: INTERNAL MEDICINE CLINIC | Facility: CLINIC | Age: 72
End: 2022-07-11
Payer: MEDICARE

## 2022-07-11 VITALS
HEART RATE: 76 BPM | SYSTOLIC BLOOD PRESSURE: 129 MMHG | DIASTOLIC BLOOD PRESSURE: 78 MMHG | TEMPERATURE: 99 F | BODY MASS INDEX: 49.96 KG/M2 | HEIGHT: 63 IN | OXYGEN SATURATION: 91 % | WEIGHT: 282 LBS | RESPIRATION RATE: 20 BRPM

## 2022-07-11 DIAGNOSIS — I25.700 CORONARY ARTERY DISEASE INVOLVING CORONARY BYPASS GRAFT OF NATIVE HEART WITH UNSTABLE ANGINA PECTORIS (HCC): ICD-10-CM

## 2022-07-11 DIAGNOSIS — E53.8 VITAMIN B 12 DEFICIENCY: ICD-10-CM

## 2022-07-11 DIAGNOSIS — E04.2 MULTINODULAR GOITER (NONTOXIC): ICD-10-CM

## 2022-07-11 DIAGNOSIS — I50.33 ACUTE ON CHRONIC DIASTOLIC CONGESTIVE HEART FAILURE (HCC): Primary | ICD-10-CM

## 2022-07-11 DIAGNOSIS — M85.89 OSTEOPENIA OF MULTIPLE SITES: ICD-10-CM

## 2022-07-11 DIAGNOSIS — N28.89 RENAL MASS, LEFT: ICD-10-CM

## 2022-07-11 DIAGNOSIS — N18.32 STAGE 3B CHRONIC KIDNEY DISEASE (HCC): Chronic | ICD-10-CM

## 2022-07-11 DIAGNOSIS — E11.65 UNCONTROLLED TYPE 2 DIABETES MELLITUS WITH HYPERGLYCEMIA (HCC): ICD-10-CM

## 2022-07-11 DIAGNOSIS — Z71.85 VACCINE COUNSELING: ICD-10-CM

## 2022-07-11 DIAGNOSIS — R06.00 DOE (DYSPNEA ON EXERTION): ICD-10-CM

## 2022-07-11 DIAGNOSIS — G89.29 CHRONIC PAIN OF BOTH KNEES: ICD-10-CM

## 2022-07-11 DIAGNOSIS — M25.561 CHRONIC PAIN OF BOTH KNEES: ICD-10-CM

## 2022-07-11 DIAGNOSIS — I10 HTN (HYPERTENSION), BENIGN: ICD-10-CM

## 2022-07-11 DIAGNOSIS — Z12.11 SCREEN FOR COLON CANCER: ICD-10-CM

## 2022-07-11 DIAGNOSIS — Z12.31 ENCOUNTER FOR SCREENING MAMMOGRAM FOR MALIGNANT NEOPLASM OF BREAST: ICD-10-CM

## 2022-07-11 DIAGNOSIS — E55.9 VITAMIN D DEFICIENCY: ICD-10-CM

## 2022-07-11 DIAGNOSIS — M25.562 CHRONIC PAIN OF BOTH KNEES: ICD-10-CM

## 2022-07-11 PROCEDURE — 3074F SYST BP LT 130 MM HG: CPT | Performed by: INTERNAL MEDICINE

## 2022-07-11 PROCEDURE — 3008F BODY MASS INDEX DOCD: CPT | Performed by: INTERNAL MEDICINE

## 2022-07-11 PROCEDURE — 1125F AMNT PAIN NOTED PAIN PRSNT: CPT | Performed by: INTERNAL MEDICINE

## 2022-07-11 PROCEDURE — 99215 OFFICE O/P EST HI 40 MIN: CPT | Performed by: INTERNAL MEDICINE

## 2022-07-11 PROCEDURE — 3078F DIAST BP <80 MM HG: CPT | Performed by: INTERNAL MEDICINE

## 2022-07-11 RX ORDER — BLOOD SUGAR DIAGNOSTIC
STRIP MISCELLANEOUS
Qty: 50 STRIP | Refills: 11 | Status: SHIPPED | OUTPATIENT
Start: 2022-07-11

## 2022-07-11 RX ORDER — ALBUTEROL SULFATE 90 UG/1
2 AEROSOL, METERED RESPIRATORY (INHALATION) EVERY 4 HOURS PRN
Qty: 1 EACH | Refills: 1 | Status: SHIPPED | OUTPATIENT
Start: 2022-07-11

## 2022-07-11 RX ORDER — LANCETS 30 GAUGE
EACH MISCELLANEOUS
Qty: 50 EACH | Refills: 11 | Status: SHIPPED | OUTPATIENT
Start: 2022-07-11

## 2022-07-11 RX ORDER — AMLODIPINE BESYLATE 5 MG/1
5 TABLET ORAL DAILY
Qty: 90 TABLET | Refills: 1 | Status: SHIPPED | OUTPATIENT
Start: 2022-07-11

## 2022-07-11 RX ORDER — FLUTICASONE PROPIONATE AND SALMETEROL 250; 50 UG/1; UG/1
1 POWDER RESPIRATORY (INHALATION) EVERY 12 HOURS SCHEDULED
Qty: 1 EACH | Refills: 1 | Status: SHIPPED | OUTPATIENT
Start: 2022-07-11

## 2022-07-11 RX ORDER — ALBUTEROL SULFATE 2.5 MG/3ML
2.5 SOLUTION RESPIRATORY (INHALATION) EVERY 4 HOURS PRN
Qty: 30 EACH | Refills: 1 | Status: SHIPPED | OUTPATIENT
Start: 2022-07-11

## 2022-07-11 RX ORDER — INSULIN GLARGINE 100 [IU]/ML
INJECTION, SOLUTION SUBCUTANEOUS
Qty: 36 EACH | Refills: 1 | Status: SHIPPED | OUTPATIENT
Start: 2022-07-11

## 2022-08-16 ENCOUNTER — TELEPHONE (OUTPATIENT)
Dept: INTERNAL MEDICINE CLINIC | Facility: CLINIC | Age: 72
End: 2022-08-16

## 2022-08-16 NOTE — TELEPHONE ENCOUNTER
Patient called and states that she will call to schedule for her biopsy but she would like to know first if she is on blood thinner. Medication record reviewed, patient  confirmed that she is still taking ASA 81 mg and prasugrel, both prescribed by her cardiology   Instructed to call her cardiology regarding the instructions on stopping the 2 medications prior to biopsy, stated understanding.

## 2022-08-18 ENCOUNTER — HOSPITAL ENCOUNTER (OUTPATIENT)
Dept: GENERAL RADIOLOGY | Age: 72
Discharge: HOME | End: 2022-08-18
Attending: INTERNAL MEDICINE
Payer: MEDICARE

## 2022-08-18 ENCOUNTER — LAB ENCOUNTER (OUTPATIENT)
Dept: LAB | Age: 72
End: 2022-08-18
Attending: INTERNAL MEDICINE
Payer: MEDICARE

## 2022-08-18 DIAGNOSIS — G89.29 CHRONIC PAIN OF BOTH KNEES: ICD-10-CM

## 2022-08-18 DIAGNOSIS — M25.562 CHRONIC PAIN OF BOTH KNEES: ICD-10-CM

## 2022-08-18 DIAGNOSIS — M25.561 CHRONIC PAIN OF BOTH KNEES: ICD-10-CM

## 2022-08-18 DIAGNOSIS — E55.9 VITAMIN D DEFICIENCY: ICD-10-CM

## 2022-08-18 DIAGNOSIS — M85.89 OSTEOPENIA OF MULTIPLE SITES: ICD-10-CM

## 2022-08-18 DIAGNOSIS — D50.9 IRON DEFICIENCY ANEMIA, UNSPECIFIED IRON DEFICIENCY ANEMIA TYPE: ICD-10-CM

## 2022-08-18 LAB
BASOPHILS # BLD AUTO: 0.05 X10(3) UL (ref 0–0.2)
BASOPHILS NFR BLD AUTO: 0.5 %
DEPRECATED HBV CORE AB SER IA-ACNC: 6.3 NG/ML
DEPRECATED RDW RBC AUTO: 48.3 FL (ref 35.1–46.3)
EOSINOPHIL # BLD AUTO: 0.23 X10(3) UL (ref 0–0.7)
EOSINOPHIL NFR BLD AUTO: 2.3 %
ERYTHROCYTE [DISTWIDTH] IN BLOOD BY AUTOMATED COUNT: 18.3 % (ref 11–15)
EST. AVERAGE GLUCOSE BLD GHB EST-MCNC: 166 MG/DL (ref 68–126)
HBA1C MFR BLD: 7.4 % (ref ?–5.7)
HCT VFR BLD AUTO: 36.7 %
HGB BLD-MCNC: 9.9 G/DL
IMM GRANULOCYTES # BLD AUTO: 0.02 X10(3) UL (ref 0–1)
IMM GRANULOCYTES NFR BLD: 0.2 %
IRON SATN MFR SERPL: 6 %
IRON SERPL-MCNC: 27 UG/DL
LYMPHOCYTES # BLD AUTO: 1.73 X10(3) UL (ref 1–4)
LYMPHOCYTES NFR BLD AUTO: 17.3 %
MCH RBC QN AUTO: 19.9 PG (ref 26–34)
MCHC RBC AUTO-ENTMCNC: 27 G/DL (ref 31–37)
MCV RBC AUTO: 73.7 FL
MONOCYTES # BLD AUTO: 0.74 X10(3) UL (ref 0.1–1)
MONOCYTES NFR BLD AUTO: 7.4 %
NEUTROPHILS # BLD AUTO: 7.23 X10 (3) UL (ref 1.5–7.7)
NEUTROPHILS # BLD AUTO: 7.23 X10(3) UL (ref 1.5–7.7)
NEUTROPHILS NFR BLD AUTO: 72.3 %
PLATELET # BLD AUTO: 265 10(3)UL (ref 150–450)
RBC # BLD AUTO: 4.98 X10(6)UL
TIBC SERPL-MCNC: 444 UG/DL (ref 240–450)
TRANSFERRIN SERPL-MCNC: 298 MG/DL (ref 200–360)
TSI SER-ACNC: 1 MIU/ML (ref 0.36–3.74)
VIT B12 SERPL-MCNC: 459 PG/ML (ref 193–986)
VIT D+METAB SERPL-MCNC: 34.8 NG/ML (ref 30–100)
WBC # BLD AUTO: 10 X10(3) UL (ref 4–11)

## 2022-08-18 PROCEDURE — 82306 VITAMIN D 25 HYDROXY: CPT

## 2022-08-18 PROCEDURE — 36415 COLL VENOUS BLD VENIPUNCTURE: CPT | Performed by: INTERNAL MEDICINE

## 2022-08-18 PROCEDURE — 82607 VITAMIN B-12: CPT | Performed by: INTERNAL MEDICINE

## 2022-08-18 PROCEDURE — 73560 X-RAY EXAM OF KNEE 1 OR 2: CPT | Performed by: INTERNAL MEDICINE

## 2022-08-18 PROCEDURE — 85025 COMPLETE CBC W/AUTO DIFF WBC: CPT

## 2022-08-18 PROCEDURE — 83540 ASSAY OF IRON: CPT

## 2022-08-18 PROCEDURE — 3051F HG A1C>EQUAL 7.0%<8.0%: CPT | Performed by: INTERNAL MEDICINE

## 2022-08-18 PROCEDURE — 84466 ASSAY OF TRANSFERRIN: CPT

## 2022-08-18 PROCEDURE — 82728 ASSAY OF FERRITIN: CPT

## 2022-08-18 PROCEDURE — 84443 ASSAY THYROID STIM HORMONE: CPT | Performed by: INTERNAL MEDICINE

## 2022-08-18 PROCEDURE — 83036 HEMOGLOBIN GLYCOSYLATED A1C: CPT | Performed by: INTERNAL MEDICINE

## 2022-08-19 PROBLEM — M17.0 PRIMARY OSTEOARTHRITIS OF BOTH KNEES: Status: ACTIVE | Noted: 2022-08-19

## 2022-08-25 NOTE — TELEPHONE ENCOUNTER
Please Review.  Protocol failed or has no protocol  Requested Prescriptions   Pending Prescriptions Disp Refills    DICLOFENAC 1 % External Gel [Pharmacy Med Name: DICLOFENAC SODIUM 1% GEL] 300 g 2     Sig: APPLY 2 GRAMS TO AFFECTED AREA 4 TIMES A DAY        There is no refill protocol information for this order          Recent Outpatient Visits              1 month ago Acute on chronic diastolic congestive heart failure [facility])    [address], [address], Marino Suarez MD    Office Visit    4 months ago Asthma with COPD (chronic obstructive pulmonary disease) [facility])    [facility], [address], Marino Suarez MD    Office Visit    11 months ago Coronary artery disease involving native coronary artery of native heart without angina pectoris    [facility]TY [facility] Maynor Kim, APRN    Office Visit    12 months ago HTN (hypertension), benign    [address], [address], Pamlea Longoria 88, APRN    Office Visit    1 year ago Coronary artery disease involving native coronary artery of native heart with angina pectoris [facility])    [address], [address], Pamela Longoria 88, APRN    Telemedicine           Future Appointments         Provider Department Appt Notes    In 2 weeks [address]     In 2 weeks 300 [facility]note 3[facility] RN RADIOLOGY 1; 49[facility] Ultrasound     In 1 month Dottie Sanchez MD [address], [facility] 3 mth     In 1 month [address]

## 2022-09-04 RX ORDER — ALBUTEROL SULFATE 90 UG/1
2 AEROSOL, METERED RESPIRATORY (INHALATION) EVERY 4 HOURS PRN
Qty: 8.5 EACH | Refills: 1 | Status: SHIPPED | OUTPATIENT
Start: 2022-09-04

## 2022-09-04 NOTE — TELEPHONE ENCOUNTER
Refill passed per [employer] [facility] protocol.     Requested Prescriptions   Pending Prescriptions Disp Refills    ALBUTEROL 108 (90 Base) MCG/ACT Inhalation Aero Soln [Pharmacy Med Name: ALBUTEROL HFA (PROAIR) INHALER] 8.5 each 1     Sig: INHALE 2 PUFFS INTO THE LUNGS EVERY 4 HOURS AS NEEDED FOR WHEEZE        Asthma & COPD Medication Protocol Passed - 9/4/2022 12:21 AM        Passed - In person appointment or virtual visit in the past 6 mos or appointment in next 3 mos       Recent Outpatient Visits              1 month ago Acute on chronic diastolic congestive heart failure [facility])    [place] [facility], [address], Trisha Childress MD    Office Visit    5 months ago Asthma with COPD (chronic obstructive pulmonary disease) [facility])    [facility], [address], Janice Suarez MD    Office Visit    1 year ago Coronary artery disease involving native coronary artery of native heart without angina pectoris    [facility] [place]    Office Visit    1 year ago HTN (hypertension), benign    [place] [employer] [facility] [facility], [address], Anish Longoria, ROC    Office Visit    1 year ago Coronary artery disease involving native coronary artery of native heart with angina pectoris [facility])    [place] [employer] [facility] [facility], [address], Anish Longoria, APRN    Telemedicine     Future Appointments         Provider Department Appt Notes    In 5 days [address]     In 1 week [facility] RN RADIOLOGY 1; [facility] Ultrasound     In 1 month Renan Badillo MD [employer] [facility], [address] 3 mth     In 1 month [facility] OF [place] [address] [address] [place] [address]                      Recent Outpatient Visits              1 month ago Acute on chronic diastolic congestive heart failure [facility])    [place] [facility], [address], Trisha Childress MD    Office Visit    5 months ago Asthma with COPD (chronic obstructive pulmonary disease) [facility])    [place] [employer] [facility], [address], Carla Suarez MD    Office Visit    1 year ago Coronary artery disease involving native coronary artery of native heart without angina pectoris    [address] [facility] Cardiology [place], APRN    Office Visit    1 year ago HTN (hypertension), benign    [facility], [address], Anish Longoria, ROC    Office Visit    1 year ago Coronary artery disease involving native coronary artery of native heart with angina pectoris [facility])    [place] [email or web address] [facility], [address], Michael Longoria APRN    Telemedicine            Future Appointments         Provider Department Appt Notes    In 5 days [facility]     In 1 week [address]; 4960 [facility] Ultrasound     In 1 month Tiana Mitchell MD [place] [email or web address] [place], [facility], [address] 3 [facility]     In 1 month [address]

## 2022-09-06 RX ORDER — FLUTICASONE PROPIONATE AND SALMETEROL 250; 50 UG/1; UG/1
1 POWDER RESPIRATORY (INHALATION) EVERY 12 HOURS
Qty: 60 EACH | Refills: 1 | Status: SHIPPED | OUTPATIENT
Start: 2022-09-06 | End: 2022-11-15

## 2022-09-07 NOTE — TELEPHONE ENCOUNTER
Refill passed per [address] [place] protocol.      Requested Prescriptions   Pending Prescriptions Disp Refills    FLUTICASONE-SALMETEROL 250-50 MCG/ACT Inhalation Aerosol Powder, Breath Activated [Pharmacy Med Name: Marybeth Herrera 250-50 INHUB] 60 each 1     Sig: INHALE 1 PUFF BY MOUTH EVERY 12 HOURS        Asthma & COPD Medication Protocol Passed - 9/6/2022  9:37 PM        Passed - In person appointment or virtual visit in the past 6 mos or appointment in next 3 mos       Recent Outpatient Visits              1 month ago Acute on chronic diastolic congestive heart failure [facility])    [address] [place], [address], Bynum Gitelman., MD    Office Visit    5 months ago Asthma with COPD (chronic obstructive pulmonary disease) [facility])    [facility], [address], [place] Tamika Suarez MD    Office Visit    1 year ago Coronary artery disease involving native coronary artery of native heart without angina pectoris    [facility] Jesus Roldan, APRN    Office Visit    1 year ago HTN (hypertension), benign    [address] [place], [address], Aleisha Longoria, APRN    Office Visit    1 year ago Coronary artery disease involving native coronary artery of native heart with angina pectoris [facility])    [address] [place], [address], Aleisha Longoria, APRN    Telemedicine     Future Appointments         Provider Department Appt Notes    In 3 days [address]     In 6 days 300 [facility] RN RADIOLOGY 1; [facility] bx instructions given    In 1 month Lluvia Pop MD [address] [place], [address] 3 mth     In 1 month Felicia [address] for Health                       Future Appointments         Provider Department Appt Notes    In 3 days [address]     In 6 days 300 [facility] RN RADIOLOGY 1; 4960 [facility] [place] Ultrasound bx instructions given    In 1 month Blair Haq MD [place] [facility] [place], [facility], [address] 3 mth     In 1 month [facility] OF THE [facility] [address] [facility] for [facility]              Recent Outpatient Visits              1 month ago Acute on chronic diastolic congestive heart failure [facility])    [place] [employer] [place], [facility], [place], Shayla Loredo MD    Office Visit    5 months ago Asthma with COPD (chronic obstructive pulmonary disease) [facility])    [place], [place], Mickey Suarez MD    Office Visit    1 year ago Coronary artery disease involving native coronary artery of native heart without angina pectoris    [facility] [place]    Office Visit    1 year ago HTN (hypertension), benign    [facility], [facility], [place], Anish Longoria APRN    Office Visit    1 year ago Coronary artery disease involving native coronary artery of native heart with angina pectoris [facility])    [facility], [place], [place], Anish Longoria, [address] [place] [address]

## 2022-09-09 ENCOUNTER — LAB ENCOUNTER (OUTPATIENT)
Dept: LAB | Facility: HOSPITAL | Age: 72
End: 2022-09-09
Attending: INTERNAL MEDICINE
Payer: MEDICARE

## 2022-09-09 DIAGNOSIS — Z11.59 SCREENING FOR VIRAL DISEASE: ICD-10-CM

## 2022-09-09 DIAGNOSIS — Z01.818 PREOP EXAMINATION: ICD-10-CM

## 2022-09-10 LAB — SARS-COV-2 RNA RESP QL NAA+PROBE: NOT DETECTED

## 2022-09-12 ENCOUNTER — HOSPITAL ENCOUNTER (OUTPATIENT)
Dept: ULTRASOUND IMAGING | Facility: HOSPITAL | Age: 72
Discharge: HOME | End: 2022-09-12
Attending: INTERNAL MEDICINE
Payer: MEDICARE

## 2022-09-12 VITALS — RESPIRATION RATE: 16 BRPM | HEART RATE: 77 BPM | SYSTOLIC BLOOD PRESSURE: 151 MMHG | DIASTOLIC BLOOD PRESSURE: 86 MMHG

## 2022-09-12 DIAGNOSIS — E04.1 THYROID NODULE: ICD-10-CM

## 2022-09-12 PROCEDURE — 88173 CYTOPATH EVAL FNA REPORT: CPT | Performed by: INTERNAL MEDICINE

## 2022-09-12 PROCEDURE — 10005 FNA BX W/US GDN 1ST LES: CPT | Performed by: INTERNAL MEDICINE

## 2022-09-12 PROCEDURE — 88172 CYTP DX EVAL FNA 1ST EA SITE: CPT | Performed by: INTERNAL MEDICINE

## 2022-09-12 PROCEDURE — 88177 CYTP FNA EVAL EA ADDL: CPT | Performed by: INTERNAL MEDICINE

## 2022-09-12 PROCEDURE — 88305 TISSUE EXAM BY PATHOLOGIST: CPT | Performed by: INTERNAL MEDICINE

## 2022-09-12 NOTE — IMAGING NOTE
1338 Pt arrived to ultrasound room #2     1410 Scans taken by SHAKILA REDD ultrasound  sonographer     1340 History taken and as follows:  Left thyroid nodule increase in size. 1341 Procedure explained questions answered. 1343 Consent verified and obtained      1423  scans reviewed by Dr. Vera Wells    Site marked LEFT THYROID NODULE     1424 Time out taken      1426 Area cleaned sterile towels  to site. Pathology  was  notified. 1428 Lidocaine 1% 10 milligrams per ml  from kit  was given 2.5 ml    at 1428     FNA # 1 taken at  1430 with 25 g needle    FNA # 2 taken at 1432  with 25 g needle    FNA # 3 taken at  1437 with 25 g needle    1439 Procedure completed area re scanned . Area cleaned band aid to site ice pack to site. 1444 Post instructions given  verbal et written with AVS summary sheet provided to patient. Also instructed patient to refrain from drinking or eating anything hot for several hours after biopsy  to prevent increase bleeding from occurring. 0  Pt  discharged comfortably via wheelchair with daughter.

## 2022-09-22 RX ORDER — LISINOPRIL 5 MG/1
5 TABLET ORAL DAILY
Qty: 90 TABLET | Refills: 1 | Status: SHIPPED | OUTPATIENT
Start: 2022-09-22 | End: 2023-03-16

## 2022-09-22 NOTE — TELEPHONE ENCOUNTER
Protocol failed or has No Protocol, please review  Requested Prescriptions   Pending Prescriptions Disp Refills    LISINOPRIL 5 MG Oral Tab [Pharmacy Med Name: LISINOPRIL 5 MG TABLET] 90 tablet 1     Sig: Take 1 tablet (5 mg total) by mouth daily. Hypertensive Medications Protocol Failed - 9/22/2022 12:41 AM        Failed - Last BP reading less than 140/90     BP Readings from Last 1 Encounters:  09/12/22 : 151/86                Failed - CMP or BMP in past 6 months     No results found for this or any previous visit (from the past 4392 hour(s)).               Passed - In person appointment in the past 12 or next 3 months       Recent Outpatient Visits              2 months ago Acute on chronic diastolic congestive heart failure [facility])    [address] [place], [address], Daniel Pereira MD    Office Visit    5 months ago Asthma with COPD (chronic obstructive pulmonary disease) [facility])    [facility], [address], Viki Suarez MD    Office Visit    1 year ago Coronary artery disease involving native coronary artery of native heart without angina pectoris    [facility], APRN    Office Visit    1 year ago HTN (hypertension), benign    [address], [address], Anish Longoria, ROC    Office Visit    1 year ago Coronary artery disease involving native coronary artery of native heart with angina pectoris [facility])    [address] [place], [address], Anish Longoria, APRN    Telemedicine     Future Appointments         Provider Department Appt Notes    In 3 weeks Tiana Mitchell MD [address], [address] 3 mth     In 4 weeks Felicia 150 [facility] [address] [place] [address]                Passed - In person appointment or virtual visit in the past 6 months       Recent Outpatient Visits              2 months ago Acute on chronic diastolic congestive heart failure [facility])    [address] [place], [address], Alba Morgan MD    Office Visit    5 months ago Asthma with COPD (chronic obstructive pulmonary disease) [facility])    [facility], [address], Manuel Suarez MD    Office Visit    1 year ago Coronary artery disease involving native coronary artery of native heart without angina pectoris    [facility] [place], APRN    Office Visit    1 year ago HTN (hypertension), [facility]    [employer] [place] [employer] [facility], [address], Glenn Brandy, [address], APRN    Office Visit    1 year ago Coronary artery disease involving native coronary artery of native heart with angina pectoris [facility])    [place] [employer] [place] [facility], [address], Glenngeorge Nava, [address], APRN    Telemedicine     Future Appointments         Provider Department Appt Notes    In 3 weeks Charles Hinson MD [employer] [facility], [address] 3 mth     In 4 weeks [address] [place] [address] Rd for [address] [facility] > 50     GFR Evaluation  GFRNAA: 89 , resulted on 6/9/2022                Future Appointments         Provider Department Appt Notes    In 3 weeks Charles Hinson MD [employer] [facility], [address] 3 mth     In 4 weeks [address] for Health           Recent Outpatient Visits              2 months ago Acute on chronic diastolic congestive heart failure [facility])    [place] [employer] [place] [facility], [address], Alba Morgan MD    Office Visit    5 months ago Asthma with COPD (chronic obstructive pulmonary disease) [facility])    [facility], [address], Manuel Suarez MD    Office Visit    1 year ago Coronary artery disease involving native coronary artery of native heart without angina pectoris    [facility] [place], APRN    Office Visit    1 year ago HTN (hypertension), [facility]    [employer] [facility], [address], Glenngeorge Nava, [address], APRN    Office Visit    1 year ago Coronary artery disease involving native coronary artery of native heart with angina pectoris [facility])    [place] [facility] [place], [facility], Glenn Mendez [place], [address] [place] [address]

## 2022-10-05 RX ORDER — ATORVASTATIN CALCIUM 40 MG/1
TABLET, FILM COATED ORAL
Qty: 90 TABLET | Refills: 1 | Status: SHIPPED | OUTPATIENT
Start: 2022-10-05

## 2022-10-17 ENCOUNTER — TELEPHONE (OUTPATIENT)
Dept: INTERNAL MEDICINE CLINIC | Facility: CLINIC | Age: 72
End: 2022-10-17

## 2022-10-17 ENCOUNTER — OFFICE VISIT (OUTPATIENT)
Dept: INTERNAL MEDICINE CLINIC | Facility: CLINIC | Age: 72
End: 2022-10-17
Payer: MEDICARE

## 2022-10-17 VITALS
RESPIRATION RATE: 20 BRPM | DIASTOLIC BLOOD PRESSURE: 76 MMHG | OXYGEN SATURATION: 93 % | BODY MASS INDEX: 50.14 KG/M2 | SYSTOLIC BLOOD PRESSURE: 134 MMHG | WEIGHT: 283 LBS | HEART RATE: 75 BPM | HEIGHT: 63 IN | TEMPERATURE: 98 F

## 2022-10-17 DIAGNOSIS — Z12.11 SCREEN FOR COLON CANCER: ICD-10-CM

## 2022-10-17 DIAGNOSIS — E53.8 VITAMIN B 12 DEFICIENCY: ICD-10-CM

## 2022-10-17 DIAGNOSIS — Z71.85 VACCINE COUNSELING: ICD-10-CM

## 2022-10-17 DIAGNOSIS — E55.9 VITAMIN D DEFICIENCY: ICD-10-CM

## 2022-10-17 DIAGNOSIS — I50.33 ACUTE ON CHRONIC DIASTOLIC CONGESTIVE HEART FAILURE (HCC): ICD-10-CM

## 2022-10-17 DIAGNOSIS — I10 HTN (HYPERTENSION), BENIGN: ICD-10-CM

## 2022-10-17 DIAGNOSIS — D64.9 ANEMIA, UNSPECIFIED TYPE: ICD-10-CM

## 2022-10-17 DIAGNOSIS — E78.2 COMBINED HYPERLIPIDEMIA: ICD-10-CM

## 2022-10-17 DIAGNOSIS — N28.89 RENAL MASS, LEFT: ICD-10-CM

## 2022-10-17 DIAGNOSIS — N18.32 STAGE 3B CHRONIC KIDNEY DISEASE (HCC): Primary | Chronic | ICD-10-CM

## 2022-10-17 DIAGNOSIS — E11.65 UNCONTROLLED TYPE 2 DIABETES MELLITUS WITH HYPERGLYCEMIA (HCC): ICD-10-CM

## 2022-10-17 PROCEDURE — 3008F BODY MASS INDEX DOCD: CPT | Performed by: INTERNAL MEDICINE

## 2022-10-17 PROCEDURE — 3078F DIAST BP <80 MM HG: CPT | Performed by: INTERNAL MEDICINE

## 2022-10-17 PROCEDURE — 1126F AMNT PAIN NOTED NONE PRSNT: CPT | Performed by: INTERNAL MEDICINE

## 2022-10-17 PROCEDURE — 99214 OFFICE O/P EST MOD 30 MIN: CPT | Performed by: INTERNAL MEDICINE

## 2022-10-17 PROCEDURE — 3075F SYST BP GE 130 - 139MM HG: CPT | Performed by: INTERNAL MEDICINE

## 2022-10-17 RX ORDER — INSULIN GLARGINE 100 [IU]/ML
INJECTION, SOLUTION SUBCUTANEOUS
Qty: 36 EACH | Refills: 1 | Status: SHIPPED | OUTPATIENT
Start: 2022-10-17

## 2022-10-17 NOTE — TELEPHONE ENCOUNTER
Patient was advice to return in January for a follow up  No more opening please advice she prefers mornings in [place]

## 2022-10-20 ENCOUNTER — HOSPITAL ENCOUNTER (OUTPATIENT)
Dept: MAMMOGRAPHY | Facility: HOSPITAL | Age: 72
Discharge: HOME | End: 2022-10-20
Attending: INTERNAL MEDICINE
Payer: MEDICARE

## 2022-10-20 DIAGNOSIS — Z12.31 ENCOUNTER FOR SCREENING MAMMOGRAM FOR MALIGNANT NEOPLASM OF BREAST: ICD-10-CM

## 2022-10-20 PROCEDURE — 77067 SCR MAMMO BI INCL CAD: CPT | Performed by: INTERNAL MEDICINE

## 2022-10-20 PROCEDURE — 77063 BREAST TOMOSYNTHESIS BI: CPT | Performed by: INTERNAL MEDICINE

## 2022-11-18 RX ORDER — ALBUTEROL SULFATE 90 UG/1
2 AEROSOL, METERED RESPIRATORY (INHALATION) EVERY 4 HOURS PRN
Qty: 8.5 EACH | Refills: 5 | Status: SHIPPED | OUTPATIENT
Start: 2022-11-18

## 2022-11-18 NOTE — TELEPHONE ENCOUNTER
Refill passed per [employer] [facility] protocol.     Requested Prescriptions   Pending Prescriptions Disp Refills    ALBUTEROL 108 (90 Base) MCG/ACT Inhalation Aero Soln [Pharmacy Med Name: ALBUTEROL HFA (PROAIR) INHALER] 8.5 each 1     Sig: INHALE 2 PUFFS INTO THE LUNGS EVERY 4 HOURS AS NEEDED FOR WHEEZE       Asthma & COPD Medication Protocol Passed - 11/18/2022  7:07 AM        Passed - In person appointment or virtual visit in the past 6 mos or appointment in next 3 mos     Recent Outpatient Visits              1 month ago Stage 3b chronic kidney disease ([address]    [place] [employer] [place] [facility], [address], Pennie Suarez MD    Office Visit    4 months ago Acute on chronic diastolic congestive heart failure [facility])    [facility], [address], Freddie Landrum MD    Office Visit    7 months ago Asthma with COPD (chronic obstructive pulmonary disease) [facility])    [facility], [address], Pennie Suarez MD    Office Visit    1 year ago Coronary artery disease involving native coronary artery of native heart without angina pectoris    [facility] [place] [facility] Lyle Ren, APRN    Office Visit    1 year ago HTN (hypertension), benign    [facility], [address], Anish Longoria, APRN    Office Visit          Future Appointments         Provider Department Appt Notes    In 1 month Rupa Chandra MD [facility], [address] from last office visit   (policy informed)    In 4 months Rupa Chandra MD [place] [employer] [place] [facility], [address], [facility]                     Recent Outpatient Visits              1 month ago Stage 3b chronic kidney disease [facility])    [facility], [address], Freddie Landrum MD    Office Visit    4 months ago Acute on chronic diastolic congestive heart failure [facility])    [facility], [address], Freddie Landrum MD    Office Visit    7 months ago Asthma with COPD (chronic obstructive pulmonary disease) [facility])    [place], [address], Viki Suarez MD    Office Visit    1 year ago Coronary artery disease involving native coronary artery of native heart without angina pectoris    [facility] Cressey Shanks, APRN    Office Visit    1 year ago HTN (hypertension), benign    [facility], [address], Anish Longoria, APRRAMYA    Office Visit            Future Appointments         Provider Department Appt Notes    In 1 month Tiana Began., MD [place], [address] [facility] [facility] [address] from last office visit   (policy informed)    In 4 months Tiana Began., MD [place], [address], [place]

## 2022-11-24 NOTE — TELEPHONE ENCOUNTER
Not on active medication list.  Please advise.   Last office visit :  12/6/19 with Dr. Derek Lincoln DISPLAY PLAN FREE TEXT

## 2022-11-27 RX ORDER — PEN NEEDLE, DIABETIC 32GX 5/32"
NEEDLE, DISPOSABLE MISCELLANEOUS
Qty: 200 EACH | Refills: 5 | Status: SHIPPED | OUTPATIENT
Start: 2022-11-27

## 2022-11-27 NOTE — TELEPHONE ENCOUNTER
Refill passed per [facility] protocol   Requested Prescriptions   Pending Prescriptions Disp Refills    BD PEN NEEDLE ALEKSANDER 2ND GEN 32G X 4 MM Does not apply Misc [Pharmacy Med Name: BD ALEKSANDER 2 GEN PEN NDL [id number]]  5     Sig: INJECT TWICE A DAY       Diabetic Supplies Protocol Passed - 11/27/2022 12:21 AM        Passed - In person appointment or virtual visit in the past 12 mos or appointment in next 3 mos     Recent Outpatient Visits              1 month ago Stage 3b chronic kidney disease ([address]    [facility], [address], Lula Kauffman MD    Office Visit    4 months ago Acute on chronic diastolic congestive heart failure [facility])    [facility], [address], Lula Kauffman MD    Office Visit    8 months ago Asthma with COPD (chronic obstructive pulmonary disease) [facility])    [facility], [address], Asher Suarez MD    Office Visit    1 year ago Coronary artery disease involving native coronary artery of native heart without angina pectoris    [facility] Daina Bassett, APRN    Office Visit    1 year ago HTN (hypertension), benign    [facility], [address], Anish Longoria, ROC    Office Visit          Future Appointments         Provider Department Appt Notes    In 1 month Kolby Dominguez MD [facility], [address] from last office visit   (policy informed)    In 4 months Kolby Dominguez MD [facility], [address], [facility]

## 2022-12-21 ENCOUNTER — TELEPHONE (OUTPATIENT)
Dept: INTERNAL MEDICINE CLINIC | Facility: CLINIC | Age: 72
End: 2022-12-21

## 2022-12-21 NOTE — TELEPHONE ENCOUNTER
Patient, states that she was seen in [facility] ER yesterday and was told to follow up with her pcp. Pt would only like an appointment to see Dr. Foreign Jha. There are no available appointments. Would the doctor like to add the patient to the schedule? If so which date and time?

## 2022-12-22 ENCOUNTER — MED REC SCAN ONLY (OUTPATIENT)
Dept: INTERNAL MEDICINE CLINIC | Facility: CLINIC | Age: 72
End: 2022-12-22

## 2022-12-22 NOTE — TELEPHONE ENCOUNTER
Can do 1130 December 29, 2022 at [facility] office for follow-up. I am squeezing her in between patients will be just to follow-up on that.

## 2022-12-29 ENCOUNTER — OFFICE VISIT (OUTPATIENT)
Dept: INTERNAL MEDICINE CLINIC | Facility: CLINIC | Age: 72
End: 2022-12-29
Payer: MEDICARE

## 2022-12-29 VITALS
RESPIRATION RATE: 20 BRPM | DIASTOLIC BLOOD PRESSURE: 84 MMHG | TEMPERATURE: 98 F | HEIGHT: 63 IN | SYSTOLIC BLOOD PRESSURE: 146 MMHG | WEIGHT: 287 LBS | HEART RATE: 76 BPM | OXYGEN SATURATION: 90 % | BODY MASS INDEX: 50.85 KG/M2

## 2022-12-29 DIAGNOSIS — I10 HTN (HYPERTENSION), BENIGN: ICD-10-CM

## 2022-12-29 DIAGNOSIS — Z12.11 COLON CANCER SCREENING: ICD-10-CM

## 2022-12-29 DIAGNOSIS — I25.119 CORONARY ARTERY DISEASE INVOLVING NATIVE CORONARY ARTERY OF NATIVE HEART WITH ANGINA PECTORIS (HCC): ICD-10-CM

## 2022-12-29 DIAGNOSIS — E55.9 VITAMIN D DEFICIENCY: ICD-10-CM

## 2022-12-29 DIAGNOSIS — E11.65 UNCONTROLLED TYPE 2 DIABETES MELLITUS WITH HYPERGLYCEMIA (HCC): ICD-10-CM

## 2022-12-29 DIAGNOSIS — Z71.85 VACCINE COUNSELING: ICD-10-CM

## 2022-12-29 DIAGNOSIS — I25.700 CORONARY ARTERY DISEASE INVOLVING CORONARY BYPASS GRAFT OF NATIVE HEART WITH UNSTABLE ANGINA PECTORIS (HCC): ICD-10-CM

## 2022-12-29 DIAGNOSIS — N18.32 STAGE 3B CHRONIC KIDNEY DISEASE (HCC): Primary | Chronic | ICD-10-CM

## 2022-12-29 DIAGNOSIS — I50.33 ACUTE ON CHRONIC DIASTOLIC CONGESTIVE HEART FAILURE (HCC): ICD-10-CM

## 2022-12-29 LAB
ALBUMIN SERPL-MCNC: 3.6 G/DL (ref 3.4–5)
ALBUMIN/GLOB SERPL: 0.9 {RATIO} (ref 1–2)
ALP LIVER SERPL-CCNC: 87 U/L
ALT SERPL-CCNC: 17 U/L
ANION GAP SERPL CALC-SCNC: 11 MMOL/L (ref 0–18)
AST SERPL-CCNC: 15 U/L (ref 15–37)
BASOPHILS # BLD AUTO: 0.07 X10(3) UL (ref 0–0.2)
BASOPHILS NFR BLD AUTO: 0.5 %
BILIRUB SERPL-MCNC: 1.2 MG/DL (ref 0.1–2)
BUN BLD-MCNC: 15 MG/DL (ref 7–18)
BUN/CREAT SERPL: 16 (ref 10–20)
CALCIUM BLD-MCNC: 9.1 MG/DL (ref 8.5–10.1)
CHLORIDE SERPL-SCNC: 108 MMOL/L (ref 98–112)
CHOLEST SERPL-MCNC: 82 MG/DL (ref ?–200)
CO2 SERPL-SCNC: 25 MMOL/L (ref 21–32)
CREAT BLD-MCNC: 0.94 MG/DL
DEPRECATED HBV CORE AB SER IA-ACNC: 9.5 NG/ML
DEPRECATED RDW RBC AUTO: 50 FL (ref 35.1–46.3)
EOSINOPHIL # BLD AUTO: 0.13 X10(3) UL (ref 0–0.7)
EOSINOPHIL NFR BLD AUTO: 1 %
ERYTHROCYTE [DISTWIDTH] IN BLOOD BY AUTOMATED COUNT: 19.2 % (ref 11–15)
EST. AVERAGE GLUCOSE BLD GHB EST-MCNC: 163 MG/DL (ref 68–126)
FASTING PATIENT LIPID ANSWER: YES
FASTING STATUS PATIENT QL REPORTED: YES
GFR SERPLBLD BASED ON 1.73 SQ M-ARVRAT: 65 ML/MIN/1.73M2 (ref 60–?)
GLOBULIN PLAS-MCNC: 3.8 G/DL (ref 2.8–4.4)
GLUCOSE BLD-MCNC: 141 MG/DL (ref 70–99)
HBA1C MFR BLD: 7.3 % (ref ?–5.7)
HCT VFR BLD AUTO: 37 %
HDLC SERPL-MCNC: 34 MG/DL (ref 40–59)
HGB BLD-MCNC: 9.3 G/DL
IMM GRANULOCYTES # BLD AUTO: 0.06 X10(3) UL (ref 0–1)
IMM GRANULOCYTES NFR BLD: 0.5 %
IRON SATN MFR SERPL: 5 %
IRON SERPL-MCNC: 23 UG/DL
LDLC SERPL CALC-MCNC: 32 MG/DL (ref ?–100)
LYMPHOCYTES # BLD AUTO: 1.45 X10(3) UL (ref 1–4)
LYMPHOCYTES NFR BLD AUTO: 11.1 %
MCH RBC QN AUTO: 18.6 PG (ref 26–34)
MCHC RBC AUTO-ENTMCNC: 25.1 G/DL (ref 31–37)
MCV RBC AUTO: 74.1 FL
MONOCYTES # BLD AUTO: 0.73 X10(3) UL (ref 0.1–1)
MONOCYTES NFR BLD AUTO: 5.6 %
NEUTROPHILS # BLD AUTO: 10.6 X10 (3) UL (ref 1.5–7.7)
NEUTROPHILS # BLD AUTO: 10.6 X10(3) UL (ref 1.5–7.7)
NEUTROPHILS NFR BLD AUTO: 81.3 %
NONHDLC SERPL-MCNC: 48 MG/DL (ref ?–130)
OSMOLALITY SERPL CALC.SUM OF ELEC: 301 MOSM/KG (ref 275–295)
PLATELET # BLD AUTO: 294 10(3)UL (ref 150–450)
POTASSIUM SERPL-SCNC: 4.3 MMOL/L (ref 3.5–5.1)
PROT SERPL-MCNC: 7.4 G/DL (ref 6.4–8.2)
RBC # BLD AUTO: 4.99 X10(6)UL
SODIUM SERPL-SCNC: 144 MMOL/L (ref 136–145)
TIBC SERPL-MCNC: 492 UG/DL (ref 240–450)
TRANSFERRIN SERPL-MCNC: 330 MG/DL (ref 200–360)
TRIGL SERPL-MCNC: 77 MG/DL (ref 30–149)
VLDLC SERPL CALC-MCNC: 10 MG/DL (ref 0–30)
WBC # BLD AUTO: 13 X10(3) UL (ref 4–11)

## 2022-12-29 PROCEDURE — 99214 OFFICE O/P EST MOD 30 MIN: CPT | Performed by: INTERNAL MEDICINE

## 2022-12-29 PROCEDURE — 36415 COLL VENOUS BLD VENIPUNCTURE: CPT | Performed by: INTERNAL MEDICINE

## 2022-12-29 PROCEDURE — 3077F SYST BP >= 140 MM HG: CPT | Performed by: INTERNAL MEDICINE

## 2022-12-29 PROCEDURE — 1125F AMNT PAIN NOTED PAIN PRSNT: CPT | Performed by: INTERNAL MEDICINE

## 2022-12-29 PROCEDURE — 3008F BODY MASS INDEX DOCD: CPT | Performed by: INTERNAL MEDICINE

## 2022-12-29 PROCEDURE — 3079F DIAST BP 80-89 MM HG: CPT | Performed by: INTERNAL MEDICINE

## 2023-03-24 NOTE — TELEPHONE ENCOUNTER
Refill passed per [facility] protocol.   Requested Prescriptions   Pending Prescriptions Disp Refills    METFORMIN HCL 1000 MG Oral Tab [Pharmacy Med Name: METFORMIN HCL 1,000 MG TABLET] 180 tablet 1     Sig: TAKE 1 TABLET BY MOUTH TWICE A DAY WITH MEALS       Diabetes Medication Protocol Passed - 3/24/2023 12:54 AM        Passed - Last A1C < 7.5 and within past 6 months     Lab Results   Component Value Date    A1C 7.3 (H) 12/29/2022             Passed - In person appointment or virtual visit in the past 6 mos or appointment in next 3 mos     Recent Outpatient Visits              2 months ago Stage 3b chronic kidney disease ([facility]    [facility], [address], Daniel Pereira MD    Office Visit    5 months ago Stage 3b chronic kidney disease [facility])    [facility] Mejia, [address], Daniel Pereira MD    Office Visit    8 months ago Acute on chronic diastolic congestive heart failure [facility])    [facility] Me[facility]ia, [address], Daniel Pereira MD    Office Visit    11 months ago Asthma with COPD (chronic obstructive pulmonary disease) ([facility]    [facility], [address], Daniel Pereira MD    Office Visit    1 year ago Coronary artery disease involving native coronary artery of native heart without angina pectoris    [facility], [address] [facility] [place]    Office Visit          Future Appointments         Provider Department Appt Notes    In 1 week [facility], [address] [facility]               Passed - EGFRCR or GFRNAA > 50     GFR Evaluation  EGFRCR: 72 , resulted on 12/29/2022          Passed - GFR in the past 12 months

## 2023-03-25 RX ORDER — INSULIN GLARGINE 100 [IU]/ML
INJECTION, SOLUTION SUBCUTANEOUS
Qty: 90 EACH | Refills: 2 | Status: SHIPPED | OUTPATIENT
Start: 2023-03-25

## 2023-03-25 NOTE — TELEPHONE ENCOUNTER
Refill passed per [address] [place] protocol.    Requested Prescriptions   Pending Prescriptions Disp Refills    LANTUS SOLOSTAR 100 UNIT/ML Subcutaneous Solution Pen-injector [Pharmacy Med Name: Esequiel Morgan 100 UNIT/ML]  2     Sig: INJECT 54 UNITS SUBCUTANEOUSLY EVERY 12 HOURS       Diabetes Medication Protocol Passed - 3/25/2023 11:50 AM        Passed - Last A1C < 7.5 and within past 6 months     Lab Results   Component Value Date    A1C 7.3 (H) 12/29/2022             Passed - In person appointment or virtual visit in the past 6 mos or appointment in next 3 mos     Recent Outpatient Visits              2 months ago Stage 3b chronic kidney disease ([facility]    [facility] [facility], [address], Nanci Maynard MD    Office Visit    5 months ago Stage 3b chronic kidney disease [facility])    [employer] [facility], [address], Nanci Maynard MD    Office Visit    8 months ago Acute on chronic diastolic congestive heart failure [facility])    [employer] [facility] [facility], [address], Nanci Maynard MD    Office Visit    11 months ago Asthma with COPD (chronic obstructive pulmonary disease) ([facility]    [employer] [facility], [address], Nanci Maynard MD    Office Visit    1 year ago Coronary artery disease involving native coronary artery of native heart without angina pectoris    [facility], [address] [place] [address] [place] Diana    Office Visit          Future Appointments         Provider Department Appt Notes    In 1 week Moshe Murphy MD [facility], [address] [facility] [facility], [facility]               Passed - EGFRCR or GFRNAA > 50     GFR Evaluation  EGFRCR: 65 , resulted on 12/29/2022          Passed - GFR in the past 12 months            Recent Outpatient Visits              2 months ago Stage 3b chronic kidney disease [facility])    Nanci Burgess MD Office Visit    5 months ago Stage 3b chronic kidney disease ([facility]    [address], [address], Ivett Hartmann MD    Office Visit    8 months ago Acute on chronic diastolic congestive heart failure [facility])    [address], [address], Ivett Hartmann MD    Office Visit    11 months ago Asthma with COPD (chronic obstructive pulmonary disease) [facility])    [address], [address], Ivett Hartmann MD    Office Visit    1 year ago Coronary artery disease involving native coronary artery of native heart without angina pectoris    [facility], [address] [facility] [place] Rodriges    Office Visit           Future Appointments         Provider Department Appt Notes    In 1 week Dottie Sanchez MD [address], [address], [place]

## 2023-03-27 RX ORDER — NITROGLYCERIN 0.4 MG/1
TABLET SUBLINGUAL
Qty: 25 TABLET | Refills: 3 | OUTPATIENT
Start: 2023-03-27

## 2023-04-05 RX ORDER — INSULIN LISPRO 100 [IU]/ML
INJECTION, SOLUTION INTRAVENOUS; SUBCUTANEOUS
COMMUNITY
Start: 2023-01-20

## 2023-04-05 RX ORDER — ATORVASTATIN CALCIUM 40 MG/1
40 TABLET, FILM COATED ORAL NIGHTLY
Qty: 90 TABLET | Refills: 3 | Status: SHIPPED | OUTPATIENT
Start: 2023-04-05

## 2023-04-05 NOTE — TELEPHONE ENCOUNTER
Refill passed per [address] protocol    Requested Prescriptions   Pending Prescriptions Disp Refills    ATORVASTATIN 40 MG Oral Tab [Pharmacy Med Name: ATORVASTATIN 40 MG TABLET] 90 tablet 1     Sig: TAKE 1 TABLET BY MOUTH EVERY DAY AT NIGHT       Cholesterol Medication Protocol Passed - 4/5/2023 12:52 AM        Passed - ALT in past 12 months        Passed - LDL in past 12 months        Passed - Last ALT < 80     Lab Results   Component Value Date    ALT 17 12/29/2022             Passed - Last LDL < 130     Lab Results   Component Value Date    LDL 32 12/29/2022             Passed - In person appointment or virtual visit in the past 12 mos or appointment in next 3 mos     Recent Outpatient Visits              3 months ago Stage 3b chronic kidney disease ([facility]    [address], [address], Daniel Pereira MD    Office Visit    5 months ago Stage 3b chronic kidney disease [facility])    [address], [address], Daniel Pereira MD    Office Visit    8 months ago Acute on chronic diastolic congestive heart failure [facility])    [address], [address], Daniel Pereira MD    Office Visit    1 year ago Asthma with COPD (chronic obstructive pulmonary disease) ([facility]    [address], [address], Daniel Pereira MD    Office Visit    1 year ago Coronary artery disease involving native coronary artery of native heart without angina pectoris    [facility], [address] [facility], APRN    Office Visit          Future Appointments         Provider Department Appt Notes    Tomorrow Tiana Began., MD [address], [address], [place]/spoke with pt confirming apt and to arrive 15 min early/3-29/tf

## 2023-04-06 ENCOUNTER — TELEPHONE (OUTPATIENT)
Dept: INTERNAL MEDICINE CLINIC | Facility: CLINIC | Age: 73
End: 2023-04-06

## 2023-04-06 ENCOUNTER — OFFICE VISIT (OUTPATIENT)
Dept: INTERNAL MEDICINE CLINIC | Facility: CLINIC | Age: 73
End: 2023-04-06

## 2023-04-06 VITALS
WEIGHT: 283 LBS | DIASTOLIC BLOOD PRESSURE: 75 MMHG | RESPIRATION RATE: 20 BRPM | OXYGEN SATURATION: 92 % | BODY MASS INDEX: 50.14 KG/M2 | HEART RATE: 78 BPM | SYSTOLIC BLOOD PRESSURE: 134 MMHG | TEMPERATURE: 98 F | HEIGHT: 63 IN

## 2023-04-06 DIAGNOSIS — Z12.11 COLON CANCER SCREENING: ICD-10-CM

## 2023-04-06 DIAGNOSIS — M81.0 AGE-RELATED OSTEOPOROSIS WITHOUT CURRENT PATHOLOGICAL FRACTURE: ICD-10-CM

## 2023-04-06 DIAGNOSIS — R31.9 HEMATURIA, UNSPECIFIED TYPE: ICD-10-CM

## 2023-04-06 DIAGNOSIS — I25.700 CORONARY ARTERY DISEASE INVOLVING CORONARY BYPASS GRAFT OF NATIVE HEART WITH UNSTABLE ANGINA PECTORIS (HCC): ICD-10-CM

## 2023-04-06 DIAGNOSIS — E04.2 MULTINODULAR GOITER (NONTOXIC): ICD-10-CM

## 2023-04-06 DIAGNOSIS — B35.3 TINEA PEDIS OF BOTH FEET: ICD-10-CM

## 2023-04-06 DIAGNOSIS — R16.0 HEPATOMEGALY: ICD-10-CM

## 2023-04-06 DIAGNOSIS — H04.129 TEAR FILM INSUFFICIENCY, UNSPECIFIED LATERALITY: ICD-10-CM

## 2023-04-06 DIAGNOSIS — Z12.11 SCREEN FOR COLON CANCER: ICD-10-CM

## 2023-04-06 DIAGNOSIS — I10 HTN (HYPERTENSION), BENIGN: ICD-10-CM

## 2023-04-06 DIAGNOSIS — H25.13 AGE-RELATED NUCLEAR CATARACT OF BOTH EYES: ICD-10-CM

## 2023-04-06 DIAGNOSIS — E53.8 VITAMIN B 12 DEFICIENCY: ICD-10-CM

## 2023-04-06 DIAGNOSIS — N18.32 STAGE 3B CHRONIC KIDNEY DISEASE (HCC): Chronic | ICD-10-CM

## 2023-04-06 DIAGNOSIS — Z12.31 ENCOUNTER FOR SCREENING MAMMOGRAM FOR MALIGNANT NEOPLASM OF BREAST: ICD-10-CM

## 2023-04-06 DIAGNOSIS — H52.03 HYPEROPIA OF BOTH EYES WITH ASTIGMATISM AND PRESBYOPIA: ICD-10-CM

## 2023-04-06 DIAGNOSIS — H25.013 CORTICAL AGE-RELATED CATARACT OF BOTH EYES: ICD-10-CM

## 2023-04-06 DIAGNOSIS — R80.9 PROTEINURIA, UNSPECIFIED TYPE: ICD-10-CM

## 2023-04-06 DIAGNOSIS — J44.9 ASTHMA WITH COPD (CHRONIC OBSTRUCTIVE PULMONARY DISEASE) (HCC): Primary | Chronic | ICD-10-CM

## 2023-04-06 DIAGNOSIS — E66.01 OBESITY, CLASS III, BMI 40-49.9 (MORBID OBESITY) (HCC): ICD-10-CM

## 2023-04-06 DIAGNOSIS — H52.4 HYPEROPIA OF BOTH EYES WITH ASTIGMATISM AND PRESBYOPIA: ICD-10-CM

## 2023-04-06 DIAGNOSIS — E55.9 VITAMIN D DEFICIENCY: ICD-10-CM

## 2023-04-06 DIAGNOSIS — I50.33 ACUTE ON CHRONIC DIASTOLIC CONGESTIVE HEART FAILURE (HCC): ICD-10-CM

## 2023-04-06 DIAGNOSIS — I25.119 CORONARY ARTERY DISEASE INVOLVING NATIVE CORONARY ARTERY OF NATIVE HEART WITH ANGINA PECTORIS (HCC): ICD-10-CM

## 2023-04-06 DIAGNOSIS — Z00.00 ENCOUNTER FOR ANNUAL HEALTH EXAMINATION: ICD-10-CM

## 2023-04-06 DIAGNOSIS — H40.039 ANATOMICAL NARROW ANGLE GLAUCOMA WITH BORDERLINE INTRAOCULAR PRESSURE, UNSPECIFIED LATERALITY: ICD-10-CM

## 2023-04-06 DIAGNOSIS — N28.89 RENAL MASS, LEFT: ICD-10-CM

## 2023-04-06 DIAGNOSIS — M85.89 OSTEOPENIA OF MULTIPLE SITES: ICD-10-CM

## 2023-04-06 DIAGNOSIS — E66.01 CLASS 3 SEVERE OBESITY DUE TO EXCESS CALORIES WITH SERIOUS COMORBIDITY AND BODY MASS INDEX (BMI) OF 50.0 TO 59.9 IN ADULT (HCC): ICD-10-CM

## 2023-04-06 DIAGNOSIS — H25.043 POSTERIOR SUBCAPSULAR POLAR AGE-RELATED CATARACT OF BOTH EYES: ICD-10-CM

## 2023-04-06 DIAGNOSIS — E78.2 COMBINED HYPERLIPIDEMIA: ICD-10-CM

## 2023-04-06 DIAGNOSIS — E11.65 UNCONTROLLED TYPE 2 DIABETES MELLITUS WITH HYPERGLYCEMIA (HCC): ICD-10-CM

## 2023-04-06 DIAGNOSIS — M17.0 PRIMARY OSTEOARTHRITIS OF BOTH KNEES: ICD-10-CM

## 2023-04-06 DIAGNOSIS — Z71.85 VACCINE COUNSELING: ICD-10-CM

## 2023-04-06 DIAGNOSIS — H52.203 HYPEROPIA OF BOTH EYES WITH ASTIGMATISM AND PRESBYOPIA: ICD-10-CM

## 2023-04-06 LAB
BILIRUBIN: NEGATIVE
GLUCOSE (URINE DIPSTICK): NEGATIVE MG/DL
KETONES (URINE DIPSTICK): NEGATIVE MG/DL
MULTISTIX LOT#: ABNORMAL NUMERIC
NITRITE, URINE: POSITIVE
OCCULT BLOOD: NEGATIVE
PH, URINE: 5.5 (ref 4.5–8)
SPECIFIC GRAVITY: 1.02 (ref 1–1.03)
URINE-COLOR: YELLOW
UROBILINOGEN,SEMI-QN: 0.2 MG/DL (ref 0–1.9)

## 2023-04-06 PROCEDURE — 3008F BODY MASS INDEX DOCD: CPT | Performed by: INTERNAL MEDICINE

## 2023-04-06 PROCEDURE — 3075F SYST BP GE 130 - 139MM HG: CPT | Performed by: INTERNAL MEDICINE

## 2023-04-06 PROCEDURE — 3044F HG A1C LEVEL LT 7.0%: CPT | Performed by: INTERNAL MEDICINE

## 2023-04-06 PROCEDURE — 1126F AMNT PAIN NOTED NONE PRSNT: CPT | Performed by: INTERNAL MEDICINE

## 2023-04-06 PROCEDURE — 36415 COLL VENOUS BLD VENIPUNCTURE: CPT | Performed by: INTERNAL MEDICINE

## 2023-04-06 PROCEDURE — 3078F DIAST BP <80 MM HG: CPT | Performed by: INTERNAL MEDICINE

## 2023-04-06 PROCEDURE — 3060F POS MICROALBUMINURIA REV: CPT | Performed by: INTERNAL MEDICINE

## 2023-04-06 RX ORDER — CLOTRIMAZOLE AND BETAMETHASONE DIPROPIONATE 10; .64 MG/G; MG/G
1 CREAM TOPICAL 2 TIMES DAILY
Qty: 90 G | Refills: 2 | Status: SHIPPED | OUTPATIENT
Start: 2023-04-06

## 2023-04-06 RX ORDER — FUROSEMIDE 20 MG/1
20 TABLET ORAL 2 TIMES DAILY
Qty: 180 TABLET | Refills: 3 | Status: SHIPPED | OUTPATIENT
Start: 2023-04-06

## 2023-04-06 RX ORDER — FERROUS SULFATE 325(65) MG
TABLET ORAL
COMMUNITY
Start: 2023-02-22

## 2023-04-06 RX ORDER — NAPROXEN SODIUM 220 MG
TABLET ORAL
COMMUNITY
Start: 2023-02-22

## 2023-04-06 NOTE — TELEPHONE ENCOUNTER
Can do 130 PM on 7-11-23 at [facility] office for FU. Or 1030 AM on 7-24-23 at [facility] for FU. non-reactive

## 2023-04-06 NOTE — TELEPHONE ENCOUNTER
Patient was advice to return in 3 months she prefers morning appointment in [facility] office please lucero

## 2023-04-07 LAB
ALBUMIN SERPL-MCNC: 3.6 G/DL (ref 3.4–5)
ALBUMIN/GLOB SERPL: 1 {RATIO} (ref 1–2)
ALP LIVER SERPL-CCNC: 89 U/L
ALT SERPL-CCNC: 16 U/L
ANION GAP SERPL CALC-SCNC: 9 MMOL/L (ref 0–18)
AST SERPL-CCNC: 14 U/L (ref 15–37)
BILIRUB SERPL-MCNC: 1 MG/DL (ref 0.1–2)
BUN BLD-MCNC: 11 MG/DL (ref 7–18)
BUN/CREAT SERPL: 12.2 (ref 10–20)
CALCIUM BLD-MCNC: 9.3 MG/DL (ref 8.5–10.1)
CHLORIDE SERPL-SCNC: 104 MMOL/L (ref 98–112)
CO2 SERPL-SCNC: 26 MMOL/L (ref 21–32)
CREAT BLD-MCNC: 0.9 MG/DL
CREAT UR-SCNC: 147 MG/DL
FASTING STATUS PATIENT QL REPORTED: YES
GFR SERPLBLD BASED ON 1.73 SQ M-ARVRAT: 69 ML/MIN/1.73M2 (ref 60–?)
GLOBULIN PLAS-MCNC: 3.7 G/DL (ref 2.8–4.4)
GLUCOSE BLD-MCNC: 68 MG/DL (ref 70–99)
MICROALBUMIN UR-MCNC: 154 MG/DL
MICROALBUMIN/CREAT 24H UR-RTO: 1047.6 UG/MG (ref ?–30)
OSMOLALITY SERPL CALC.SUM OF ELEC: 286 MOSM/KG (ref 275–295)
POTASSIUM SERPL-SCNC: 4 MMOL/L (ref 3.5–5.1)
PROT SERPL-MCNC: 7.3 G/DL (ref 6.4–8.2)
SODIUM SERPL-SCNC: 139 MMOL/L (ref 136–145)
WBC #/AREA URNS AUTO: >50 /HPF
WBC CLUMPS UR QL AUTO: PRESENT /HPF

## 2023-04-09 ENCOUNTER — TELEPHONE (OUTPATIENT)
Dept: INTERNAL MEDICINE CLINIC | Facility: CLINIC | Age: 73
End: 2023-04-09

## 2023-04-09 DIAGNOSIS — E04.2 MULTINODULAR GOITER (NONTOXIC): Primary | ICD-10-CM

## 2023-04-09 DIAGNOSIS — N28.89 RENAL MASS, LEFT: ICD-10-CM

## 2023-04-09 PROBLEM — D50.0 IRON DEFICIENCY ANEMIA DUE TO CHRONIC BLOOD LOSS: Status: ACTIVE | Noted: 2023-04-09

## 2023-04-09 NOTE — PROGRESS NOTES
CMP Normal (electrolytes, kidney and liver functions),   Urine culture shows UTI. Will send antibiotics to pharmacy. Finish. Check urine > 7 days post antibiotic. Orders written.

## 2023-04-10 NOTE — TELEPHONE ENCOUNTER
I had already told patient I could not find the mammogram at after October 2022. Because she was insistent I said we would check with the hospital that is why I wrote the message to check with the hospital to see if they have any other reports in medical records.

## 2023-04-10 NOTE — PROGRESS NOTES
Pt informed that her CMP Normal (electrolytes, kidney and liver functions),   Urine culture shows UTI. Will send antibiotics to pharmacy. Finish. Check urine > 7 days post antibiotic. Orders written.

## 2023-04-10 NOTE — TELEPHONE ENCOUNTER
Pt informed that DR. Elvira Morrissey did not find results of a mammogram from this year and also pt is due for repeat ultrasound of thyroid in September and also ultrasound of kidneys in June.

## 2023-04-10 NOTE — TELEPHONE ENCOUNTER
Patient says she had a mammogram done this year. I do not see any records of it I see the last 1 was October of last year. Also patient will be due for a repeat thyroid ultrasound in September and a repeat the ultrasound of the kidneys to check in June.

## 2023-04-13 ENCOUNTER — TELEPHONE (OUTPATIENT)
Dept: INTERNAL MEDICINE CLINIC | Facility: CLINIC | Age: 73
End: 2023-04-13

## 2023-04-13 NOTE — TELEPHONE ENCOUNTER
I sent a request to Medical Records to see if they can find mammogram results that pt states she had done after October of 2022.

## 2023-04-20 ENCOUNTER — TELEPHONE (OUTPATIENT)
Dept: INTERNAL MEDICINE CLINIC | Facility: CLINIC | Age: 73
End: 2023-04-20

## 2023-04-20 NOTE — TELEPHONE ENCOUNTER
Patient had only a mammogram in October 2022. So she will be due this October.   Orders written for there is no other orders for mammogram on file per medical records at [place].

## 2023-06-13 ENCOUNTER — MED REC SCAN ONLY (OUTPATIENT)
Dept: INTERNAL MEDICINE CLINIC | Facility: CLINIC | Age: 73
End: 2023-06-13

## 2023-06-22 RX ORDER — AMLODIPINE BESYLATE 5 MG/1
5 TABLET ORAL DAILY
Qty: 90 TABLET | Refills: 3 | Status: SHIPPED | OUTPATIENT
Start: 2023-06-22

## 2023-06-23 NOTE — TELEPHONE ENCOUNTER
Last visit 4/6/23;  Return in about 3 months (around 7/6/2023), or if symptoms worsen or fail to improve. Future Appointments   Date Time Provider Jc Magaña   7/24/2023 10:30 AM Rudi Zimmerman MD [id number] 429   9/25/2023  9:00 AM ADO US RM1 ADO US EM Srini         Refill passed per [facility] protocol. Requested Prescriptions   Pending Prescriptions Disp Refills    AMLODIPINE 5 MG Oral Tab [Pharmacy Med Name: AMLODIPINE BESYLATE 5 MG TAB] 90 tablet 1     Sig: Take 1 tablet (5 mg total) by mouth daily.        Hypertensive Medications Protocol Passed - 6/22/2023  2:06 PM        Passed - In person appointment in the past 12 or next 3 months     Recent Outpatient Visits              2 months ago Asthma with COPD (chronic obstructive pulmonary disease) ([facility]    [address], [address], Sherell Lesches., MD    Office Visit    5 months ago Stage 3b chronic kidney disease [facility])    [address], [address], Sherell Lesches., MD    Office Visit    8 months ago Stage 3b chronic kidney disease [facility])    [address], [address], Sherell Lesches., MD    Office Visit    11 months ago Acute on chronic diastolic congestive heart failure [facility])    [address], [address], Sherell Lesches., MD    Office Visit    1 year ago Asthma with COPD (chronic obstructive pulmonary disease) ([facility])    [address], [address], Sherell Lesches., MD    Office Visit          Future Appointments         Provider Department Appt Notes    In 1 month Rudi Zimmerman MD [address], [address] [facility] ok per dr William Goldberg    In 3 months ADO  [facility] - Last BP reading less than 140/90     BP Readings from Last 1 Encounters:  04/06/23 : 134/75              Passed - CMP or BMP in past 6 months Recent Results (from the past 4392 hour(s))   COMP METABOLIC PANEL (14)    Collection Time: 04/06/23 11:29 AM   Result Value Ref Range    Glucose 68 (L) 70 - 99 mg/dL    Sodium 139 136 - 145 mmol/L    Potassium 4.0 3.5 - 5.1 mmol/L    Chloride 104 98 - 112 mmol/L    CO2 26.0 21.0 - 32.0 mmol/L    Anion Gap 9 0 - 18 mmol/L    BUN 11 7 - 18 mg/dL    Creatinine 0.90 0.55 - 1.02 mg/dL    BUN/CREA Ratio 12.2 10.0 - 20.0    Calcium, Total 9.3 8.5 - 10.1 mg/dL    Calculated Osmolality 286 275 - 295 mOsm/kg    eGFR-Cr 69 >=60 mL/min/1.73m2    ALT 16 13 - 56 U/L    AST 14 (L) 15 - 37 U/L    Alkaline Phosphatase 89 55 - 142 U/L    Bilirubin, Total 1.0 0.1 - 2.0 mg/dL    Total Protein 7.3 6.4 - 8.2 g/dL    Albumin 3.6 3.4 - 5.0 g/dL    Globulin  3.7 2.8 - 4.4 g/dL    A/G Ratio 1.0 1.0 - 2.0    Patient Fasting for CMP? Yes      *Note: Due to a large number of results and/or encounters for the requested time period, some results have not been displayed. A complete set of results can be found in Results Review.                Passed - In person appointment or virtual visit in the past 6 months     Recent Outpatient Visits              2 months ago Asthma with COPD (ch[facility] obstructive [facility] disease) ([facility]    [address], [address], John Brock MD    Office Visit    5 months ago Stage 3b chronic kidney disease [facility])    [address], [address] FloorJohn MD    Office Visit    8 months ago Stage 3b chronic kidney disease [facility])    [address], [address]John MD    Office Visit    11 months ago Acute on chronic diastolic congestive heart failure [facility])    [address], [address]John MD    Office Visit    1 year ago Asthma with COPD (chronic obstructive pulmonary disease) [facility])    [address], [address], John Brock MD    Office Visit Future Appointments         Provider Department Appt Notes    In 1 month Judy Crook MD [address] [place] per dr Heath Busby    In 3 months ADO US [address] or GFRNAA > 50     GFR Evaluation  EGFRCR: 69 , resulted on 4/6/2023                Future Appointments         Provider Department Appt Notes    In 1 month Judy Crook MD [address] [place] per dr Heath Busby    In 3 months ADO [address]             Recent Outpatient Visits              2 months ago Asthma with COPD (chronic obstructive pulmonary disease) ([facility]    [address], [address], Noelle Martins MD    Office Visit    5 months ago Stage 3b chronic kidney disease [facility])    [address], [address], Noelle Martins MD    Office Visit    8 months ago Stage 3b chronic kidney disease [facility])    [address], [address], Noelle Martins MD    Office Visit    11 months ago Acute on chronic diastolic congestive heart failure [facility])    [address], [address], Noelle Martins MD    Office Visit    1 year ago Asthma with COPD (chronic obstructive pulmonary disease) [facility])    [address], [address], Noelle Martins MD    Office Visit

## 2023-06-24 RX ORDER — LISINOPRIL 5 MG/1
5 TABLET ORAL DAILY
Qty: 90 TABLET | Refills: 1 | OUTPATIENT
Start: 2023-06-24

## 2023-06-24 RX ORDER — FLUTICASONE PROPIONATE AND SALMETEROL 50; 250 UG/1; UG/1
POWDER RESPIRATORY (INHALATION)
Qty: 180 EACH | Refills: 1 | OUTPATIENT
Start: 2023-06-24

## 2023-09-19 RX ORDER — LISINOPRIL 5 MG/1
5 TABLET ORAL DAILY
Qty: 90 TABLET | Refills: 3 | Status: SHIPPED | OUTPATIENT
Start: 2023-09-19

## 2023-09-19 RX ORDER — FLUTICASONE PROPIONATE AND SALMETEROL 250; 50 UG/1; UG/1
1 POWDER RESPIRATORY (INHALATION) EVERY 12 HOURS
Qty: 180 EACH | Refills: 3 | Status: SHIPPED | OUTPATIENT
Start: 2023-09-19

## 2023-09-19 RX ORDER — INSULIN GLARGINE 100 [IU]/ML
54 INJECTION, SOLUTION SUBCUTANEOUS EVERY 12 HOURS
Qty: 100 ML | Refills: 3 | Status: SHIPPED | OUTPATIENT
Start: 2023-09-19

## 2023-09-19 NOTE — TELEPHONE ENCOUNTER
Refill passed per [employer], [facility] protocol. Requested Prescriptions   Pending Prescriptions Disp Refills    ADVAIR DISKUS 250-50 MCG/ACT Inhalation Aerosol Powder, Breath Activated [Pharmacy Med Name: Edna Horsfall 250-50 DISKUS] 180 each 1     Sig: INHALE 1 PUFF INTO THE LUNGS EVERY 12 HOURS       Asthma & COPD Medication Protocol Passed - 9/19/2023  6:24 AM        Passed - In person appointment or virtual visit in the past 6 mos or appointment in next 3 mos     Recent Outpatient Visits              5 months ago Asthma with COPD (chronic obstructive pulmonary disease) ([facility]    [address], [address], Stephen Collazo MD    Office Visit    8 months ago Stage 3b chronic kidney disease [facility])    [address], [address], Stephen Cabrera., MD    Office Visit    11 months ago Stage 3b chronic kidney disease [facility])    [address], [address], Stephen Collazo MD    Office Visit    1 year ago Acute on chronic diastolic congestive heart failure [facility])    [address], [address], Stephen Cabrera., MD    Office Visit    1 year ago Asthma with COPD (chronic obstructive pulmonary disease) ([facility])    [address], [address], Stephen Collazo MD    Office Visit          Future Appointments         Provider Department Appt Notes    In 1 month [address]    In 1 month Daniel Hodge MD [address] [place] per dr Lupillo Vázquez                      LISINOPRIL 5 MG Oral Tab [Pharmacy Med Name: LISINOPRIL 5 MG TABLET] 90 tablet 1     Sig: Take 1 tablet (5 mg total) by mouth daily.        Hypertensive Medications Protocol Passed - 9/19/2023  6:24 AM        Passed - In person appointment in the past 12 or next 3 months     Recent Outpatient Visits              5 months ago Asthma with COPD (chronic obstructive pulmonary disease) ([facility].)    [address], [address], Daniel Pereira MD    Office Visit    8 months ago Stage 3b chronic kidney disease [facility])    [address], [address], Daniel Pereira MD    Office Visit    11 months ago Stage 3b chronic kidney disease [facility])    [address], [address], Daniel Pereira MD    Office Visit    1 year ago Acute on chronic diastolic congestive heart failure [facility])    [address], [address], Daniel Pereira MD    Office Visit    1 year ago Asthma with COPD (chronic obstructive pulmonary disease) ([facility] 75.)    [address], [address], Daniel Pereira MD    Office Visit          Future Appointments         Provider Department Appt Notes    In 1 month [address]    In 1 month [facility] [address], [place] per dr Arnel Guerrero                    Passed - Last BP reading less than 140/90     BP Readings from Last 1 Encounters:  04/06/23 : 134/75              Passed - CMP or BMP in past 6 months     Recent Results (from the past 4392 hour(s))   Comp Metabolic Panel (14)    Collection Time: 04/06/23 11:29 AM   Result Value Ref Range    Glucose 68 (L) 70 - 99 mg/dL    Sodium 139 136 - 145 mmol/L    Potassium 4.0 3.5 - 5.1 mmol/L    Chloride 104 98 - 112 mmol/L    CO2 26.0 21.0 - 32.0 mmol/L    Anion Gap 9 0 - 18 mmol/L    BUN 11 7 - 18 mg/dL    Creatinine 0.90 0.55 - 1.02 mg/dL    BUN/CREA Ratio 12.2 10.0 - 20.0    Calcium, Total 9.3 8.5 - 10.1 mg/dL    Calculated Osmolality 286 275 - 295 mOsm/kg    eGFR-Cr 69 >=60 mL/min/1.73m2    ALT 16 13 - 56 U/L    AST 14 (L) 15 - 37 U/L    Alkaline Phosphatase 89 55 - 142 U/L    Bilirubin, Total 1.0 0.1 - 2.0 mg/dL    Total Protein 7.3 6.4 - 8.2 g/dL    Albumin 3.6 3.4 - 5.0 g/dL    Globulin  3.7 2.8 - 4.4 g/dL    A/G Ratio 1.0 1.0 - 2.0    Patient Fasting for CMP? Yes      *Note: Due to a large number of results and/or encounters for the requested time period, some results have not been displayed. A complete set of results can be found in Results Review.                Passed - In person appointment or virtual visit in the past 6 months     Recent Outpatient Visits              5 months ago Asthma with COPD (chronic obstructive pulmonary disease) ([facility].)    [address], [address], Roanna Tracee., MD    Office Visit    8 months ago Stage 3b chronic kidney disease [facility])    [address], [address], [facility]    Office Visit    11 months ago Stage 3b chronic kidney disease [facility])    [address], [address], Roanna [place]., MD    Office Visit    1 year ago Acute on chronic diastolic congestive heart failure [facility])    [address], [address], Roanna [place]., MD    Office Visit    1 year ago Asthma with COPD (chronic obstructive pulmonary disease) ([facility].)    [address], [address], Roanna [place]., MD    Office Visit          Future Appointments         Provider Department Appt Notes    In 1 month [address]    In 1 month Hilario Soto MD [address] [place] per dr Romaine Cardona                    Passed - EGFRCR or GFRNAA > 50     GFR Evaluation  EGFRCR: 69 , resulted on 4/6/2023            LANTUS SOLOSTAR 100 UNIT/ML Subcutaneous Solution Pen-injector [Pharmacy Med Name: Robert Figueroa 100 UNIT/ML]  2     Sig: INJECT 47 UNITS SUBCUTANEOUSLY EVERY 12 HOURS       Diabetes Medication Protocol Passed - 9/19/2023  6:24 AM        Passed - Last A1C < 7.5 and within past 6 months     Lab Results   Component Value Date    A1C 5.9 (H) 04/06/2023             Passed - In person appointment or virtual visit in the past 6 mos or appointment in next 3 mos     Recent Outpatient Visits              5 months ago Asthma with COPD (chronic obstructive pulmonary disease) ([facility] 75.)    Patience Kiran, [address], Anya Velázquez MD    Office Visit    8 months ago Stage 3b chronic kidney disease [facility])    Patience Kiran, [address], Anya Velázquez MD    Office Visit    11 months ago Stage 3b chronic kidney disease [facility])    Patience Kiran, [address], Anya Velázquez MD    Office Visit    1 year ago Acute on chronic diastolic congestive heart failure [facility])    Patience Kiran, [address], Anya Velázquez MD    Office Visit    1 year ago Asthma with COPD (chronic obstructive pulmonary disease) ([facility].)    Patience Kiran, [address], Anya Velázquez MD    Office Visit          Future Appointments         Provider Department Appt Notes    In 1 month [address]    In 1 month Savita Mackay MD [address] [facility] ok per dr Darryle Cliff                    Passed - EGFRCR or GFRNAA > 50     GFR Evaluation  EGFRCR: 69 , resulted on 4/6/2023          Passed - GFR in the past 12 months           Future Appointments         Provider Department Appt Notes    In 1 month [address]    In 1 month MD Patience Sandra, [address] [facility] ok per dr Darryle Cliff          Recent Outpatient Visits              5 months ago Asthma with COPD (chronic obstructive pulmonary disease) ([facility].)    Patience Kiran, [address], Anya Velázquez MD    Office Visit    8 months ago Stage 3b chronic kidney disease [facility])    Patience Kiran, [address], Anya Velázquez MD    Office Visit    11 months ago Stage 3b chronic kidney disease [facility])    [facility], [address], Anya Velázquez MD    Office Visit    1 year ago Acute on chronic diastolic congestive heart failure [facility])    Patience Kiran, [address], Anya Velázquez MD    Office Visit    1 year ago Asthma with COPD (chronic obstructive pulmonary disease) [facility])    Patience Kiran, [address], Anya Velázquez MD    Office Visit

## 2023-10-06 ENCOUNTER — NURSE TRIAGE (OUTPATIENT)
Dept: INTERNAL MEDICINE CLINIC | Facility: CLINIC | Age: 73
End: 2023-10-06

## 2023-10-06 NOTE — TELEPHONE ENCOUNTER
Action Requested: Summary for Provider     []  Critical Lab, Recommendations Needed  [] Need Additional Advice  []   FYI    []   Need Orders  [] Need Medications Sent to Pharmacy  []  Other     SUMMARY: Per protocol: OV    Future Appointments   Date Time Provider Jc Mooni   10/12/2023 12:00 PM Virgilio Benavides MD [id number] CW K[facility] [id number]   10/26/2023  9:00 AM ADO US RM1 ADO US EM Srini   11/4/2023 10:30 AM Virgilio Benavides MD [id number] [facility] 71     Reason for call: Hand Pain  Onset: Data Unavailable    Patient has right thumb pain and is having trouble moving it. This started a month ago. A month ago she was on a rolling chair and fell off of it and that's when the pain started. She is wearing a thumb brace.      Reason for Disposition   Injury and pain has not improved after 3 days    Protocols used: Finger Injury-A-[place]

## 2023-10-12 ENCOUNTER — OFFICE VISIT (OUTPATIENT)
Dept: INTERNAL MEDICINE CLINIC | Facility: CLINIC | Age: 73
End: 2023-10-12

## 2023-10-12 VITALS
OXYGEN SATURATION: 92 % | BODY MASS INDEX: 52 KG/M2 | TEMPERATURE: 98 F | DIASTOLIC BLOOD PRESSURE: 78 MMHG | HEART RATE: 80 BPM | WEIGHT: 291 LBS | SYSTOLIC BLOOD PRESSURE: 122 MMHG

## 2023-10-12 DIAGNOSIS — N28.89 RENAL MASS, LEFT: ICD-10-CM

## 2023-10-12 DIAGNOSIS — E04.2 MULTINODULAR GOITER (NONTOXIC): ICD-10-CM

## 2023-10-12 DIAGNOSIS — I10 HTN (HYPERTENSION), BENIGN: ICD-10-CM

## 2023-10-12 DIAGNOSIS — M79.641 PAIN OF RIGHT HAND: ICD-10-CM

## 2023-10-12 DIAGNOSIS — E78.2 COMBINED HYPERLIPIDEMIA: ICD-10-CM

## 2023-10-12 DIAGNOSIS — N18.31 STAGE 3A CHRONIC KIDNEY DISEASE (HCC): Chronic | ICD-10-CM

## 2023-10-12 DIAGNOSIS — E53.8 VITAMIN B 12 DEFICIENCY: ICD-10-CM

## 2023-10-12 DIAGNOSIS — E11.65 UNCONTROLLED TYPE 2 DIABETES MELLITUS WITH HYPERGLYCEMIA (HCC): ICD-10-CM

## 2023-10-12 DIAGNOSIS — E55.9 VITAMIN D DEFICIENCY: Primary | ICD-10-CM

## 2023-10-12 DIAGNOSIS — Z12.31 ENCOUNTER FOR SCREENING MAMMOGRAM FOR MALIGNANT NEOPLASM OF BREAST: ICD-10-CM

## 2023-10-12 DIAGNOSIS — Z71.85 VACCINE COUNSELING: ICD-10-CM

## 2023-10-12 DIAGNOSIS — I50.33 ACUTE ON CHRONIC DIASTOLIC CONGESTIVE HEART FAILURE (HCC): ICD-10-CM

## 2023-10-12 LAB — VIT D+METAB SERPL-MCNC: 19 NG/ML (ref 30–100)

## 2023-10-12 PROCEDURE — 1159F MED LIST DOCD IN RCRD: CPT | Performed by: INTERNAL MEDICINE

## 2023-10-12 PROCEDURE — 3044F HG A1C LEVEL LT 7.0%: CPT | Performed by: INTERNAL MEDICINE

## 2023-10-12 PROCEDURE — 3074F SYST BP LT 130 MM HG: CPT | Performed by: INTERNAL MEDICINE

## 2023-10-12 PROCEDURE — 3051F HG A1C>EQUAL 7.0%<8.0%: CPT | Performed by: INTERNAL MEDICINE

## 2023-10-12 PROCEDURE — 36415 COLL VENOUS BLD VENIPUNCTURE: CPT | Performed by: INTERNAL MEDICINE

## 2023-10-12 PROCEDURE — 1125F AMNT PAIN NOTED PAIN PRSNT: CPT | Performed by: INTERNAL MEDICINE

## 2023-10-12 PROCEDURE — 3078F DIAST BP <80 MM HG: CPT | Performed by: INTERNAL MEDICINE

## 2023-10-12 PROCEDURE — 99214 OFFICE O/P EST MOD 30 MIN: CPT | Performed by: INTERNAL MEDICINE

## 2023-10-12 PROCEDURE — 1160F RVW MEDS BY RX/DR IN RCRD: CPT | Performed by: INTERNAL MEDICINE

## 2023-10-12 NOTE — PATIENT INSTRUCTIONS
ASSESSMENT/PLAN:   Vitamin d deficiency  (primary encounter diagnosis) Check blood. Vitamin b 12 deficiency Check blood. Vaccine counseling Holding flu and shingrix and covid booster. Uncontrolled type 2 diabetes mellitus with hyperglycemia (hcc) Higher. Check blood. Careful with diet and excercise at least 30 minutes 3-4 times a week. Check sugars at different times on different dates. Careful with low sugars. Carry something with you and check sugar if can. Can carry faisal cracker, etc. Decrease carbohydrates. But also, careful with fruits and natural sugars. One serving a day and no more than 1 handful every day. Check feet  every AM and careful with sores and ulcers on feet bilaterally. Check eyes every year with dilated eye exam.  Check sugars. 2-hour postmeal should be less than 140s. Pre-meal should be 's. Both equally affected A1c. Discussed importance of glycemic control to prevent complications of diabetes  -Discussed complications of diabetes include retinopathy, neuropathy, nephropathy and cardiovascular disease  -Discussed ABCs of DM  -Discussed importance of SBGM  -Discussed importance of low CHO diet, recommend 45gm per meal or 135gm per day  - Follow up with optho    Renal mass, left Check US. Htn (hypertension), benign ? Control. Careful with diet and excercise at least 30 minutes 3-4 times a week. Check blood pressures at different times on different days. Can purchase own blood pressure monitor. If not, check at local pharmacy. Bake foods more and grill occasionally. Avoid fried foods. No salt. Use other seasonings. Stage 3a chronic kidney disease (hcc) No motrin, ibuprofen, advil, alleve, naprosyn  with these medications. Check blood. Acute on chronic diastolic congestive heart failure (hcc) Stable. Combined hyperlipidemia Stable. Multinodular goiter (nontoxic) Check blood. Check ultrasound of thyroid.     Encounter for screening mammogram for malignant neoplasm of breast Check mammogram. Continue self breast exam every month. Pain of right hand Check Xrays. [employer]. Orders Placed This Encounter      Hemoglobin A1C      Vitamin D      TSH W Reflex To Free T4      Free T4, (Free Thyroxine)      Meds This Visit:  Requested Prescriptions      No prescriptions requested or ordered in this encounter       Imaging & Referrals:  XR HAND (2 VIEWS), RIGHT (CPT=73120)      Has set FU. RTC for physical after 4-16-24.

## 2023-10-13 LAB
T4 FREE SERPL-MCNC: 1.1 NG/DL (ref 0.8–1.7)
TSI SER-ACNC: 0.86 MIU/ML (ref 0.36–3.74)

## 2023-10-14 NOTE — PROGRESS NOTES
Thyroid is good. Vitamin D level is very low. Goal is between 30 and 60. Will give prescription vitamin D 50,000 once a week recheck vitamin D level in 3 months. Orders written and prescription sent to the pharmacy.

## 2023-10-18 LAB — HGBA1C: 7.2 %

## 2023-11-04 ENCOUNTER — OFFICE VISIT (OUTPATIENT)
Dept: INTERNAL MEDICINE CLINIC | Facility: CLINIC | Age: 73
End: 2023-11-04

## 2023-11-04 VITALS
HEIGHT: 63 IN | HEART RATE: 73 BPM | BODY MASS INDEX: 51.91 KG/M2 | TEMPERATURE: 98 F | RESPIRATION RATE: 21 BRPM | SYSTOLIC BLOOD PRESSURE: 135 MMHG | DIASTOLIC BLOOD PRESSURE: 71 MMHG | WEIGHT: 293 LBS

## 2023-11-04 DIAGNOSIS — R20.0 NUMBNESS AND TINGLING IN BOTH HANDS: ICD-10-CM

## 2023-11-04 DIAGNOSIS — R20.2 NUMBNESS AND TINGLING IN BOTH HANDS: ICD-10-CM

## 2023-11-04 DIAGNOSIS — E55.9 VITAMIN D DEFICIENCY: ICD-10-CM

## 2023-11-04 DIAGNOSIS — I50.33 ACUTE ON CHRONIC DIASTOLIC CONGESTIVE HEART FAILURE (HCC): ICD-10-CM

## 2023-11-04 DIAGNOSIS — E11.65 UNCONTROLLED TYPE 2 DIABETES MELLITUS WITH HYPERGLYCEMIA (HCC): ICD-10-CM

## 2023-11-04 DIAGNOSIS — E53.8 VITAMIN B 12 DEFICIENCY: ICD-10-CM

## 2023-11-04 DIAGNOSIS — I10 HTN (HYPERTENSION), BENIGN: Primary | ICD-10-CM

## 2023-11-04 PROCEDURE — 3008F BODY MASS INDEX DOCD: CPT | Performed by: INTERNAL MEDICINE

## 2023-11-04 PROCEDURE — 99215 OFFICE O/P EST HI 40 MIN: CPT | Performed by: INTERNAL MEDICINE

## 2023-11-04 PROCEDURE — 1160F RVW MEDS BY RX/DR IN RCRD: CPT | Performed by: INTERNAL MEDICINE

## 2023-11-04 PROCEDURE — 3075F SYST BP GE 130 - 139MM HG: CPT | Performed by: INTERNAL MEDICINE

## 2023-11-04 PROCEDURE — 1159F MED LIST DOCD IN RCRD: CPT | Performed by: INTERNAL MEDICINE

## 2023-11-04 PROCEDURE — 3078F DIAST BP <80 MM HG: CPT | Performed by: INTERNAL MEDICINE

## 2023-11-04 NOTE — PATIENT INSTRUCTIONS
ASSESSMENT/PLAN:   Htn (hypertension), benign  (primary encounter diagnosis) Stable. Careful with diet and excercise at least 30 minutes 3-4 times a week. Check blood pressures at different times on different days. Can purchase own blood pressure monitor. If not, check at local pharmacy. Bake foods more and grill occasionally. Avoid fried foods. No salt. Use other seasonings. Vitamin b 12 deficiency Check blood 1-24. Vitamin d deficiency  Check blood 1-24. Uncontrolled type 2 diabetes mellitus with hyperglycemia (hcc) Stable. Check blood 1-24. Careful with diet and excercise at least 30 minutes 3-4 times a week. Check sugars at different times on different dates. Careful with low sugars. Carry something with you and check sugar if can. Can carry faisal cracker, etc. Decrease carbohydrates. But also, careful with fruits and natural sugars. One serving a day and no more than 1 handful every day. Check feet  every AM and careful with sores and ulcers on feet bilaterally. Check eyes every year with dilated eye exam.  Check sugars. 2-hour postmeal should be less than 140s. Pre-meal should be 's. Both equally affected A1c. Discussed importance of glycemic control to prevent complications of diabetes  -Discussed complications of diabetes include retinopathy, neuropathy, nephropathy and cardiovascular disease  -Discussed ABCs of DM  -Discussed importance of SBGM  -Discussed importance of low CHO diet, recommend 45gm per meal or 135gm per day  -UTD with optho  -Normotensive     Acute on chronic diastolic congestive heart failure (hcc) Weight every AM . If > 3 #, take extra dose of water pill furosemide. Numbness R hand. Wear carpal tunnel wrist braces. Check EMG. Cotton mouth. Biotene OTC. No orders of the defined types were placed in this encounter.       Meds This Visit:  Requested Prescriptions      No prescriptions requested or ordered in this encounter       Imaging & Referrals:  OPHTHALMOLOGY - EXTERNAL      RTC 3 months.

## 2023-11-18 DIAGNOSIS — E11.65 UNCONTROLLED TYPE 2 DIABETES MELLITUS WITH HYPERGLYCEMIA (HCC): ICD-10-CM

## 2023-11-19 RX ORDER — ALBUTEROL SULFATE 2.5 MG/3ML
2.5 SOLUTION RESPIRATORY (INHALATION) EVERY 4 HOURS PRN
Qty: 75 ML | Refills: 3 | Status: SHIPPED | OUTPATIENT
Start: 2023-11-19

## 2023-11-19 NOTE — TELEPHONE ENCOUNTER
Refill passed per [facility] protocol.    Requested Prescriptions   Pending Prescriptions Disp Refills    ALBUTEROL (2.5 MG/3ML) 0.083% Inhalation Nebu Soln [Pharmacy Med Name: ALBUTEROL SUL 2.5 MG/3 ML SOLN] 75 mL 1     Sig: INHALE 3 ML BY NEBULIZATION EVERY 4 HOURS AS NEEDED FOR WHEEZING       Asthma & COPD Medication Protocol Passed - 11/17/2023  3:36 PM        Passed - In person appointment or virtual visit in the past 6 mos or appointment in next 3 mos     Recent Outpatient Visits              2 weeks ago HTN (hypertension), benign    [facility] Vashti Link MD    Office Visit    1 month ago Vitamin D deficiency    [facility] Vashti Link MD    Office Visit    7 months ago Asthma with COPD (chronic obstructive pulmonary disease) (HCC)    [facility] Vashti Link MD    Office Visit    10 months ago Stage 3b chronic kidney disease ([facility])    [facility] Vashti Link MD    Office Visit    1 year ago Stage 3b chronic kidney disease ([facility])    [facility] Vashti Link MD    Office Visit          Future Appointments         Provider Department Appt Notes    In 2 months [address]     In 5 [facility]    In 5 months Vashti Link MD [facility] 3 MTH                    Future Appointments         Provider Department Appt Notes    In 2 months [address]     In 5 months Vashti Link MD [facility] px    In 5 months Vashti Link MD [facility] 3 MTH            Recent Outpatient  Visits              2 weeks [facility]    [facility]    [facility]    Office Visit    7 months ago Asthma with COPD (chronic obstructive pulmonary disease) ([facility]    Office Visit    10 months ago Stage 3b chronic kidney disease ([facility]    Office Visit    1 year ago Stage 3b chronic kidney disease ([facility]    Office Visit

## 2023-11-20 RX ORDER — PEN NEEDLE, DIABETIC 29 G X1/2"
NEEDLE, DISPOSABLE MISCELLANEOUS
Refills: 0 | OUTPATIENT
Start: 2023-11-20

## 2024-01-19 ENCOUNTER — HOSPITAL ENCOUNTER (OUTPATIENT)
Dept: MAMMOGRAPHY | Facility: HOSPITAL | Age: 74
Discharge: HOME | End: 2024-01-19
Attending: INTERNAL MEDICINE
Payer: MEDICARE

## 2024-01-19 DIAGNOSIS — Z12.31 ENCOUNTER FOR SCREENING MAMMOGRAM FOR MALIGNANT NEOPLASM OF BREAST: ICD-10-CM

## 2024-01-19 PROCEDURE — 77063 BREAST TOMOSYNTHESIS BI: CPT | Performed by: INTERNAL MEDICINE

## 2024-01-19 PROCEDURE — 77067 SCR MAMMO BI INCL CAD: CPT | Performed by: INTERNAL MEDICINE

## 2024-01-25 ENCOUNTER — HOSPITAL ENCOUNTER (OUTPATIENT)
Dept: MAMMOGRAPHY | Facility: HOSPITAL | Age: 74
Discharge: HOME | End: 2024-01-25
Attending: INTERNAL MEDICINE
Payer: MEDICARE

## 2024-01-25 ENCOUNTER — HOSPITAL ENCOUNTER (OUTPATIENT)
Dept: ULTRASOUND IMAGING | Facility: HOSPITAL | Age: 74
Discharge: HOME | End: 2024-01-25
Attending: INTERNAL MEDICINE
Payer: MEDICARE

## 2024-01-25 DIAGNOSIS — R92.8 ABNORMAL MAMMOGRAM: ICD-10-CM

## 2024-01-25 PROCEDURE — 76642 ULTRASOUND BREAST LIMITED: CPT | Performed by: INTERNAL MEDICINE

## 2024-01-25 PROCEDURE — 77061 BREAST TOMOSYNTHESIS UNI: CPT | Performed by: INTERNAL MEDICINE

## 2024-01-25 PROCEDURE — 77065 DX MAMMO INCL CAD UNI: CPT | Performed by: INTERNAL MEDICINE

## 2024-02-15 NOTE — TELEPHONE ENCOUNTER
Please review. Protocol failed or has no protocol.    Requested Prescriptions   Pending Prescriptions Disp Refills    DICLOFENAC 1 % External Gel [Pharmacy Med Name: DICLOFENAC SODIUM 1% GEL] 300 g 2     Sig: APPLY 2 GRAMS TO AFFECTED AREA 4 TIMES A DAY       There is no refill protocol information for this order          Recent Outpatient Visits              3 months ago HTN (hypertension), benign    [facility] Vashti Link MD    Office Visit    4 months ago [facility] D deficiency    [facility]    [facility]    10 months ago Asthma with COPD (chronic obstructive pulmonary disease) ([facility])    [facility] Vashti Link MD    Office Visit    1 year ago Stage 3b chronic kidney disease ([facility])    [facility]    Office Visit    1 year ago Stage 3b chronic kidney disease ([facility])    [facility]    [facility]            Future Appointments         Provider Department Appt Notes    In 2 months Vashti Link MD [facility] 4/6/2023 LAST px    In 2 months Vashti Link MD [facility] 3 MTH

## 2024-02-23 DIAGNOSIS — E55.9 VITAMIN D DEFICIENCY: ICD-10-CM

## 2024-02-23 RX ORDER — FOLIC ACID 1 MG/1
TABLET ORAL
Qty: 4 CAPSULE | Refills: 4 | OUTPATIENT
Start: 2024-02-23

## 2024-03-09 NOTE — TELEPHONE ENCOUNTER
Refill passed per [facility] protocol.    Requested Prescriptions   Pending Prescriptions Disp Refills    METOPROLOL TARTRATE 25 MG Oral Tab [Pharmacy Med Name: METOPROLOL TARTRATE 25 MG TAB] 180 tablet 3     Sig: TAKE 1 TABLET BY MOUTH TWICE A DAY       Hypertension Medications Protocol Passed - 3/9/2024 12:51 AM        Passed - CMP or BMP in past 12 months        Passed - Last BP reading less than 140/90     BP Readings from Last 1 Encounters:   11/04/23 135/71               Passed - In person appointment or virtual visit in the past 12 mos or appointment in next 3 mos     Recent Outpatient Visits              4 months ago [facility]hypertension[facility]    [facility]    4 months ago Vitamin D deficiency    [facility]    Office Visit    11 months ago Asthma with COPD (chronic obstructive pulmonary disease) ([facility])    [facility]    Office Visit    1 year ago Stage 3b chronic kidney disease ([facility])    [facility]    Office Visit    1 year ago Stage 3b chronic kidney disease ([facility]    [facility]          Future Appointments         Provider Department Appt Notes    In 1 month [facility]x    In 2 months Vashti Link MD [facility] 3 MTH               Passed - EGFRCR or GFRNAA > 50     GFR Evaluation  EGFRCR: 69 , resulted on 4/6/2023             Recent Outpatient Visits              4 months ago HTN (hypertension), [facility] Vashti Clark MD    Office Visit    4 months ago Vitamin D deficiency     [facility]    Office Visit    11 months ago Asthma with COPD (chronic obstructive pulmonary disease) ([facility])    [facility]    Office Visit    1 year ago Stage 3b chronic kidney disease ([facility])    [facility]    Office Visit    1 year ago Stage 3b chronic kidney disease ([facility])    [facility]    Office Visit          Future Appointments         Provider Department Appt Notes    In 1 month Vashti Link MD [facility] 4/6/2023 LAST px    In 2 months Vashti Link MD [facility] 3 MTH

## 2024-03-11 ENCOUNTER — TELEPHONE (OUTPATIENT)
Dept: INTERNAL MEDICINE CLINIC | Facility: CLINIC | Age: 74
End: 2024-03-11

## 2024-03-17 RX ORDER — ATORVASTATIN CALCIUM 40 MG/1
40 TABLET, FILM COATED ORAL NIGHTLY
Qty: 90 TABLET | Refills: 1 | Status: SHIPPED | OUTPATIENT
Start: 2024-03-17

## 2024-03-17 NOTE — TELEPHONE ENCOUNTER
Please review. Protocol failed / No Protocol.    Requested Prescriptions   Pending Prescriptions Disp Refills    METFORMIN HCL 1000 MG Oral Tab [Pharmacy Med Name: METFORMIN HCL 1,000 MG TABLET] 180 tablet 3     Sig: TAKE 1 TABLET BY MOUTH TWICE A DAY WITH MEALS       Diabetes Medication Protocol Passed - 3/15/2024  7:06 PM        Passed - Last A1C < 7.5 and within past 6 months     Lab Results   Component Value Date    A1C  10/12/2023      Comment:      Sent to [facility] for testing.        A1C 7.2 (H) 10/12/2023             Passed - In person appointment or virtual visit in the past 6 mos or appointment in next 3 mos     Recent Outpatient Visits              4 months [facility]    [facility]    11 months ago Asthma with COPD (chronic obstructive pulmonary disease) ([facility]    1 year ago Stage 3b chronic kidney disease ([facility]    1 year ago Stage 3b chronic kidney disease ([facility])    [facility] Clark MD    Office Visit          Future Appointments         Provider Department Appt Notes    [facility]    In 1 month Vashti Link MD [facility]               Passed - Microalbumin procedure in past 12 months or taking ACE/ARB        Passed - EGFRCR or GFRNAA > 50     GFR Evaluation  EGFRCR: 69 , resulted on 4/6/2023          Passed - GFR in the past 12 months          ATORVASTATIN 40 MG Oral Tab [Pharmacy Med Name:  ATORVASTATIN 40 MG TABLET] 90 tablet 3     Sig: TAKE 1 TABLET BY MOUTH EVERY DAY AT NIGHT       Cholesterol Medication Protocol Failed - 3/15/2024  7:06 PM        Failed - Lipid panel within past 12 months     Lab Results   Component Value Date    CHOLEST 82 12/29/2022    TRIG 77 12/29/2022    HDL 34 (L) 12/29/2022    LDL 32 12/29/2022    VLDL 10 12/29/2022    NONHDLC 48 12/29/2022             Passed - ALT < 80     Lab Results   Component Value Date    ALT 16 04/06/2023             Passed - ALT resulted within past year        Passed - In person appointment or virtual visit in the past 12 mos or appointment in next 3 mos     Recent Outpatient Visits              4 months ago HTN (hypertension), benign    [facility]    Office Visit    5 months ago [facility] D [facility]    [facility]    11 months ago Asthma with COPD (chronic obstructive pulmonary disease) ([facility])    [facility]    [facility] Visit    1 year ago Stage 3b chronic kidney disease ([facility])    [facility]    Office Visit    1 year ago Stage 3b chronic kidney disease ([facility]          Future Appointments         Provider Department Appt Notes    In [facility] month [facility]ST px    In 1 month [facility] Lnik MD [facility]

## 2024-04-22 ENCOUNTER — OFFICE VISIT (OUTPATIENT)
Dept: INTERNAL MEDICINE CLINIC | Facility: CLINIC | Age: 74
End: 2024-04-22

## 2024-04-22 VITALS
WEIGHT: 266.81 LBS | OXYGEN SATURATION: 100 % | TEMPERATURE: 98 F | BODY MASS INDEX: 49.1 KG/M2 | DIASTOLIC BLOOD PRESSURE: 64 MMHG | SYSTOLIC BLOOD PRESSURE: 118 MMHG | HEART RATE: 76 BPM | HEIGHT: 62 IN

## 2024-04-22 DIAGNOSIS — I25.708 CORONARY ARTERY DISEASE INVOLVING CORONARY BYPASS GRAFT OF NATIVE HEART WITH OTHER FORMS OF ANGINA PECTORIS (HCC): ICD-10-CM

## 2024-04-22 DIAGNOSIS — R16.0 HEPATOMEGALY: ICD-10-CM

## 2024-04-22 DIAGNOSIS — H25.043 POSTERIOR SUBCAPSULAR POLAR AGE-RELATED CATARACT OF BOTH EYES: ICD-10-CM

## 2024-04-22 DIAGNOSIS — E11.65 UNCONTROLLED TYPE 2 DIABETES MELLITUS WITH HYPERGLYCEMIA (HCC): ICD-10-CM

## 2024-04-22 DIAGNOSIS — B35.3 TINEA PEDIS OF BOTH FEET: ICD-10-CM

## 2024-04-22 DIAGNOSIS — R80.8 OTHER PROTEINURIA: ICD-10-CM

## 2024-04-22 DIAGNOSIS — E66.01 OBESITY, CLASS III, BMI 40-49.9 (MORBID OBESITY) (HCC): ICD-10-CM

## 2024-04-22 DIAGNOSIS — H40.039 ANATOMICAL NARROW ANGLE GLAUCOMA WITH BORDERLINE INTRAOCULAR PRESSURE, UNSPECIFIED LATERALITY: ICD-10-CM

## 2024-04-22 DIAGNOSIS — E55.9 VITAMIN D DEFICIENCY: ICD-10-CM

## 2024-04-22 DIAGNOSIS — H52.00 HYPEROPIA WITH ASTIGMATISM AND PRESBYOPIA, UNSPECIFIED LATERALITY: ICD-10-CM

## 2024-04-22 DIAGNOSIS — Z12.11 COLON CANCER SCREENING: ICD-10-CM

## 2024-04-22 DIAGNOSIS — N18.31 STAGE 3A CHRONIC KIDNEY DISEASE (HCC): Chronic | ICD-10-CM

## 2024-04-22 DIAGNOSIS — H52.4 HYPEROPIA WITH ASTIGMATISM AND PRESBYOPIA, UNSPECIFIED LATERALITY: ICD-10-CM

## 2024-04-22 DIAGNOSIS — M81.0 AGE-RELATED OSTEOPOROSIS WITHOUT CURRENT PATHOLOGICAL FRACTURE: ICD-10-CM

## 2024-04-22 DIAGNOSIS — H04.129 TEAR FILM INSUFFICIENCY, UNSPECIFIED LATERALITY: ICD-10-CM

## 2024-04-22 DIAGNOSIS — Z12.31 ENCOUNTER FOR SCREENING MAMMOGRAM FOR MALIGNANT NEOPLASM OF BREAST: ICD-10-CM

## 2024-04-22 DIAGNOSIS — I50.33 ACUTE ON CHRONIC DIASTOLIC CONGESTIVE HEART FAILURE (HCC): Primary | ICD-10-CM

## 2024-04-22 DIAGNOSIS — D50.0 IRON DEFICIENCY ANEMIA DUE TO CHRONIC BLOOD LOSS: ICD-10-CM

## 2024-04-22 DIAGNOSIS — I10 HTN (HYPERTENSION), BENIGN: ICD-10-CM

## 2024-04-22 DIAGNOSIS — H25.013 CORTICAL AGE-RELATED CATARACT OF BOTH EYES: ICD-10-CM

## 2024-04-22 DIAGNOSIS — I25.119 CORONARY ARTERY DISEASE INVOLVING NATIVE CORONARY ARTERY OF NATIVE HEART WITH ANGINA PECTORIS (HCC): ICD-10-CM

## 2024-04-22 DIAGNOSIS — G47.09 OTHER INSOMNIA: ICD-10-CM

## 2024-04-22 DIAGNOSIS — Z12.11 SCREEN FOR COLON CANCER: ICD-10-CM

## 2024-04-22 DIAGNOSIS — N28.89 RENAL MASS, LEFT: ICD-10-CM

## 2024-04-22 DIAGNOSIS — M17.0 PRIMARY OSTEOARTHRITIS OF BOTH KNEES: ICD-10-CM

## 2024-04-22 DIAGNOSIS — E53.8 VITAMIN B 12 DEFICIENCY: ICD-10-CM

## 2024-04-22 DIAGNOSIS — E78.2 COMBINED HYPERLIPIDEMIA: ICD-10-CM

## 2024-04-22 DIAGNOSIS — E04.2 MULTINODULAR GOITER (NONTOXIC): ICD-10-CM

## 2024-04-22 DIAGNOSIS — H52.209 HYPEROPIA WITH ASTIGMATISM AND PRESBYOPIA, UNSPECIFIED LATERALITY: ICD-10-CM

## 2024-04-22 DIAGNOSIS — Z00.00 ENCOUNTER FOR ANNUAL HEALTH EXAMINATION: ICD-10-CM

## 2024-04-22 DIAGNOSIS — J44.89 ASTHMA WITH COPD (CHRONIC OBSTRUCTIVE PULMONARY DISEASE) (HCC): Chronic | ICD-10-CM

## 2024-04-22 DIAGNOSIS — H25.13 AGE-RELATED NUCLEAR CATARACT OF BOTH EYES: ICD-10-CM

## 2024-04-22 DIAGNOSIS — Z71.85 VACCINE COUNSELING: ICD-10-CM

## 2024-04-22 PROBLEM — M85.89 OSTEOPENIA OF MULTIPLE SITES: Status: RESOLVED | Noted: 2022-03-09 | Resolved: 2024-04-22

## 2024-04-22 PROBLEM — M85.89 OSTEOPENIA OF MULTIPLE SITES: Status: RESOLVED | Noted: 2022-03-09 | Resolved: 2024-01-01

## 2024-04-22 LAB
ALBUMIN SERPL-MCNC: 4.7 G/DL (ref 3.2–4.8)
ALBUMIN/GLOB SERPL: 1.6 {RATIO} (ref 1–2)
ALP LIVER SERPL-CCNC: 84 U/L
ALT SERPL-CCNC: 9 U/L
ANION GAP SERPL CALC-SCNC: 8 MMOL/L (ref 0–18)
APPEARANCE: CLEAR
AST SERPL-CCNC: 18 U/L (ref ?–34)
BILIRUB SERPL-MCNC: 1.1 MG/DL (ref 0.2–1.1)
BILIRUBIN: NEGATIVE
BUN BLD-MCNC: 18 MG/DL (ref 9–23)
BUN/CREAT SERPL: 18.6 (ref 10–20)
CALCIUM BLD-MCNC: 10.1 MG/DL (ref 8.7–10.4)
CHLORIDE SERPL-SCNC: 105 MMOL/L (ref 98–112)
CHOLEST SERPL-MCNC: 109 MG/DL (ref ?–200)
CO2 SERPL-SCNC: 26 MMOL/L (ref 21–32)
CREAT BLD-MCNC: 0.97 MG/DL
CREAT UR-SCNC: 45.5 MG/DL
EGFRCR SERPLBLD CKD-EPI 2021: 62 ML/MIN/1.73M2 (ref 60–?)
EST. AVERAGE GLUCOSE BLD GHB EST-MCNC: 126 MG/DL (ref 68–126)
FASTING PATIENT LIPID ANSWER: YES
FASTING STATUS PATIENT QL REPORTED: YES
GLOBULIN PLAS-MCNC: 3 G/DL (ref 2.8–4.4)
GLUCOSE (URINE DIPSTICK): NEGATIVE MG/DL
GLUCOSE BLD-MCNC: 80 MG/DL (ref 70–99)
HBA1C MFR BLD: 6 % (ref ?–5.7)
HBV CORE AB SERPL QL IA: NONREACTIVE
HBV SURFACE AB SER QL: NONREACTIVE
HBV SURFACE AB SERPL IA-ACNC: <3.1 MIU/ML
HBV SURFACE AG SER-ACNC: <0.1 [IU]/L
HBV SURFACE AG SERPL QL IA: NONREACTIVE
HDLC SERPL-MCNC: 34 MG/DL (ref 40–59)
IGA SERPL-MCNC: 335.7 MG/DL (ref 40–350)
IGM SERPL-MCNC: 80.2 MG/DL (ref 50–300)
IMMUNOGLOBULIN PNL SER-MCNC: 956 MG/DL (ref 650–1600)
KETONES (URINE DIPSTICK): NEGATIVE MG/DL
LDLC SERPL CALC-MCNC: 54 MG/DL (ref ?–100)
MICROALBUMIN UR-MCNC: 12.9 MG/DL
MICROALBUMIN/CREAT 24H UR-RTO: 283.5 UG/MG (ref ?–30)
MULTISTIX LOT#: ABNORMAL NUMERIC
NITRITE, URINE: NEGATIVE
NONHDLC SERPL-MCNC: 75 MG/DL (ref ?–130)
OCCULT BLOOD: NEGATIVE
OSMOLALITY SERPL CALC.SUM OF ELEC: 289 MOSM/KG (ref 275–295)
PH, URINE: 7 (ref 4.5–8)
POTASSIUM SERPL-SCNC: 4.7 MMOL/L (ref 3.5–5.1)
PROT SERPL-MCNC: 7.7 G/DL (ref 5.7–8.2)
PROTEIN (URINE DIPSTICK): 30 MG/DL
SODIUM SERPL-SCNC: 139 MMOL/L (ref 136–145)
SPECIFIC GRAVITY: 1.01 (ref 1–1.03)
TRIGL SERPL-MCNC: 112 MG/DL (ref 30–149)
URINE-COLOR: YELLOW
UROBILINOGEN,SEMI-QN: 0.2 MG/DL (ref 0–1.9)
VLDLC SERPL CALC-MCNC: 16 MG/DL (ref 0–30)

## 2024-04-22 NOTE — PROGRESS NOTES
HPI:    Patient ID: Serena Chavez is a 73 year old female.    Serena Chavez is a 73 year old female who presents for a complete physical exam.   Serena Chavez is a 73 year old female who presents for a Medicare Assessment.     Chief Complaint   Patient presents with    Wellness Visit     Patient states she is here for Medicare Wellness Visit         Patient here for follow up of Diabetes.  Has been taking medications regularly.    Checks sugars 1 times occ.   Fasting sugars average . One 50 yesterday.   Watches diabetic diet, low salt.  Diabetic Flow sheet      4/22/2024     9:03 PM 4/22/2024     8:46 PM 4/22/2024    12:08 PM 4/22/2024    11:09 AM   DIABETIC FLOWSHEET ([facility])   Lisinopril 5 mg Daily OR 5 mg Daily OR  5 mg Daily OR  5 mg Daily OR    BP (enc vitals)   118/64 146/78   Last Foot Exam  4/22/2024        Last eye exam  No sign of diabetic retinopathy. Changed insurance and [facility].   Checks feet nightly, no open sores     Hypertension  Patient is here for follow up of hypertension. BP at home: not check. Not have machine.   Has been compliant with medications.  Exercise level: not active and has not been following low salt diet.  Weight has been down. Ozempic. 1/2 eats out. [employer] or [employer]. Daughter cooks occ.   Wt Readings from Last 3 Encounters:   04/22/24 266 lb 12.8 oz (121 kg)   11/04/23 298 lb 12.8 oz (135.5 kg)   10/12/23 291 lb (132 kg)     BP Readings from Last 3 Encounters:   04/22/24 118/64   11/04/23 135/71   10/12/23 122/78     Labs:   Lab Results   Component Value Date/Time    GLU 68 (L) 04/06/2023 11:29 AM     04/06/2023 11:29 AM    K 4.0 04/06/2023 11:29 AM     04/06/2023 11:29 AM    CO2 26.0 04/06/2023 11:29 AM    CREATSERUM 0.90 04/06/2023 11:29 AM    CA 9.3 04/06/2023 11:29 AM    AST 14 (L) 04/06/2023 11:29 AM    ALT 16 04/06/2023 11:29 AM    TSH 0.858 10/12/2023 12:33 PM    T4F 1.1 10/12/2023 12:33 PM        Lab Results   Component Value Date/Time    CHOLEST 82  12/29/2022 05:41 PM    HDL 34 (L) 12/29/2022 05:41 PM    TRIG 77 12/29/2022 05:41 PM    LDL 32 12/29/2022 05:41 PM    NONHDLC 48 12/29/2022 05:41 PM          Labs:   Lab Results   Component Value Date/Time    WBC 11.0 10/12/2023 12:33 PM    HGB 12.8 10/12/2023 12:33 PM    .0 10/12/2023 12:33 PM      Lab Results   Component Value Date/Time    GLU 68 (L) 04/06/2023 11:29 AM     04/06/2023 11:29 AM    K 4.0 04/06/2023 11:29 AM     04/06/2023 11:29 AM    CO2 26.0 04/06/2023 11:29 AM    CREATSERUM 0.90 04/06/2023 11:29 AM    CA 9.3 04/06/2023 11:29 AM    ALB 3.6 04/06/2023 11:29 AM    TP 7.3 04/06/2023 11:29 AM    ALKPHO 89 04/06/2023 11:29 AM    AST 14 (L) 04/06/2023 11:29 AM    ALT 16 04/06/2023 11:29 AM    BILT 1.0 04/06/2023 11:29 AM    TSH 0.858 10/12/2023 12:33 PM    T4F 1.1 10/12/2023 12:33 PM        Lab Results   Component Value Date/Time    CHOLEST 82 12/29/2022 05:41 PM    HDL 34 (L) 12/29/2022 05:41 PM    TRIG 77 12/29/2022 05:41 PM    LDL 32 12/29/2022 05:41 PM    NONHDLC 48 12/29/2022 05:41 PM       Lab Results   Component Value Date/Time    A1C  10/12/2023 12:33 PM      Comment:      Sent to [facility] for testing.        A1C 7.2 (H) 10/12/2023 12:33 PM      Lab Results   Component Value Date    VITD 19.0 (L) 10/12/2023         Health Maintenance   Topic Date Due    Mammogram  01/25/2025       Allergies:Not on File  Current Outpatient Medications   Medication Sig Dispense Refill    metFORMIN HCl 1000 MG Oral Tab Take 1 tablet (1,000 mg total) by mouth 2 (two) times daily with meals. 180 tablet 3    atorvastatin 40 MG Oral Tab Take 1 tablet (40 mg total) by mouth nightly. 90 tablet 1    metoprolol tartrate 25 MG Oral Tab Take 1 tablet (25 mg total) by mouth 2 (two) times daily. 180 tablet 2    diclofenac 1 % External Gel Apply 2 g topically 4 (four) times daily. 300 g 2    albuterol (2.5 MG/3ML) 0.083% Inhalation Nebu Soln Take 3 mL (2.5 mg total) by nebulization every 4 (four) hours as  needed for Wheezing. 75 mL 3    Insulin Pen Needle (BD PEN NEEDLE ORIGINAL U/F) 29G X 12.7MM Does not apply Misc Uses qweek. 30 each 0    semaglutide 2 MG/3ML Subcutaneous Solution Pen-injector Inject 0.25 mg into the skin once a week. 1 each 12    Cholecalciferol (VITAMIN D3) 1.25 MG (64723 UT) Oral Cap 1 po qweek. 4 capsule 4    fluticasone-salmeterol (ADVAIR DISKUS) 250-50 MCG/ACT Inhalation Aerosol Powder, Breath Activated Inhale 1 puff into the lungs Q12H. 180 each 3    lisinopril 5 MG Oral Tab Take 1 tablet (5 mg total) by mouth daily. 90 tablet 3    amLODIPine 5 MG Oral Tab Take 1 tablet (5 mg total) by mouth daily. 90 tablet 3    furosemide 20 MG Oral Tab Take 1 tablet (20 mg total) by mouth 2 (two) times daily. 180 tablet 3    naproxen (ALEVE) 220 MG Oral Tab Aleve 220 mg tablet, [RxNorm: 263666]      clotrimazole-betamethasone 1-0.05 % External Cream Apply 1 Application topically 2 (two) times daily. Apply under breasts and on feet BL but NOT between toes. 90 g 2    Insulin Pen Needle (BD PEN NEEDLE ALEKSANDER 2ND GEN) 32G X 4 MM Does not apply Misc INJECT TWICE A  each 5    albuterol 108 (90 Base) MCG/ACT Inhalation Aero Soln Inhale 2 puffs into the lungs every 4 (four) hours as needed for Wheezing. 8.5 each 5    Blood Gluc Meter Disp-Strips Does not apply Device Checks Q12hrs. Code: E11.65 100 each 5    Glucose Blood (ACCU-CHEK JULIO PLUS) In Vitro Strip DX. E11.65. CHECK BLOOD GLUCOSE EVERY MORNING 50 strip 11    insulin glargine (LANTUS SOLOSTAR) 100 UNIT/ML Subcutaneous Solution Pen-injector Inject 55 Units into the skin 2 (two) times daily. 45 mL 1    nitroGLYCERIN 0.4 MG Sublingual SL Tab Place 1 tablet (0.4 mg total) under the tongue every 5 (five) minutes as needed for Chest pain. 25 tablet 3    prasugrel 10 MG Oral Tab Take 1 tablet (10 mg total) by mouth daily. 90 tablet 1    Blood Glucose Monitoring Suppl (ACCU-CHEK GUIDE) w/Device Does not apply Kit Inject 1 kit as directed 2 (two) times  daily. Checks Q12hrs. Code: E11.65 1 kit 0    Blood Glucose Monitoring Suppl (BLOOD GLUCOSE SYSTEM JW) Does not apply Kit Checks Q12hrs. Code: E11.65 1 kit 0    DICLOFENAC SODIUM 1 % Transdermal Gel APPLY 2 GRAMS TO AFFECTED AREA 4 TIMES A  g 2    acetaminophen 500 MG Oral Tab Take 1 tablet (500 mg total) by mouth every 6 (six) hours as needed for Pain.      Lancet Devices (SIMPLE DIAGNOSTICS LANCING DEV) Does not apply Misc Checks sugar twice a day Dx E11.65, Z79.4 100 each 6    COMFORT EZ PEN NEEDLES 31G X 6 MM Does not apply Misc q12 100 each 6    aspirin 81 MG Oral Tab Take 2 tablets (162 mg total) by mouth daily.      Aspirin-Acetaminophen-Caffeine (EXCEDRIN OR) Take 2 tablets by mouth every 6 (six) hours as needed.        insulin glargine (LANTUS SOLOSTAR) 100 UNIT/ML Subcutaneous Solution Pen-injector Inject 54 Units into the skin every 12 (twelve) hours. (Patient not taking: Reported on 10/12/2023) 100 mL 3    Ascorbic Acid 500 MG Oral Chew Tab Vitamin C 500 mg chewable tablet, [RxNorm: 444313] (Patient not taking: Reported on 10/12/2023)      Ferrous Sulfate 325 (65 Fe) MG Oral Tab iron 325 mg (65 mg iron) tablet, [RxNorm: 544372] (Patient not taking: Reported on 10/12/2023)      Insulin Lispro, 1 Unit Dial, 100 UNIT/ML Subcutaneous Solution Pen-injector Inject 55 units (subcutaneous) 2 times a day. (Patient not taking: Reported on 10/12/2023)      Lancets Does not apply Misc Checks Q12hrs. Code: E11.65 (Patient not taking: Reported on 10/12/2023) 50 each 11    clotrimazole-betamethasone 1-0.05 % External Cream APPLY EVERY 12 HOURS FOR 2 WEEKS ON DRY SKIN. (Patient not taking: Reported on 3/31/2022) 60 g 3    Blood Glucose Monitoring Suppl w/Device Does not apply Kit DX E 11.22. (Patient not taking: Reported on 3/31/2022) 1 kit 0    Betamethasone Dipropionate 0.05 % External Ointment Apply q12hrs. For 1 week on R 3rd finger. (Patient not taking: Reported on 3/31/2022) 45 g 1    Clobetasol Propionate  0.05 % External Ointment Apply 1 Application topically 2 (two) times daily. Not to be placed on anywhere on face. (Patient not taking: Reported on 3/31/2022) 30 g 1      Past Medical History:    Asthma (HCC)    Asthma with COPD with exacerbation (HCC)    COPD (chronic obstructive pulmonary disease) ([facility])    Coronary atherosclerosis    Diabetes ([facility])    Grieving    High blood pressure    High cholesterol      Past Surgical History:   Procedure Laterality Date    Anesth,open heart surgery  10/03/2016    Cabg      Cath drug eluting stent      Cath percutaneous  transluminal coronary angioplasty      Tubal ligation        Family History   Problem Relation Age of Onset    Heart Disorder Father     Hypertension Father     Diabetes Mother     Heart Disorder Mother     Breast Cancer Mother 62    Heart Disorder Daughter 29        heart attack     Glaucoma Neg       Social History:  Social History     Socioeconomic History    Marital status:    Occupational History    Occupation: Office work retired.     Occupation: Used on work in [employer]. No chemicals expsoures.     Tobacco Use    Smoking status: Former     Current packs/day: 1.00     Types: Cigarettes    Smokeless tobacco: Never   Substance and Sexual Activity    Alcohol use: No     Alcohol/week: 0.0 standard drinks of alcohol    Drug use: No       History/Other:     Patient Active Problem List   Diagnosis    Multinodular goiter (nontoxic)    CAD (coronary artery disease) of bypass graft    HTN (hypertension), benign    Obesity, Class III, BMI 40-49.9 (morbid obesity) ([facility])    Asthma with COPD (chronic obstructive pulmonary disease) ([facility])    Hyperopia with astigmatism and presbyopia    Breast cancer screening    Uncontrolled type 2 diabetes mellitus with hyperglycemia ([facility])    Vitamin B 12 deficiency    Combined hyperlipidemia    Anatomical narrow angle glaucoma with borderline intraocular pressure    Tear film insufficiency    Age-related osteoporosis  without current pathological fracture    Vitamin D deficiency    Tinea pedis of both feet    Renal mass, left    Vaccine counseling    Acute on chronic diastolic congestive heart failure (HCC)    Coronary artery disease involving native coronary artery of native heart with angina pectoris (HCC)    CKD (chronic kidney disease) stage 3, GFR 30-59 ml/min (HCC)    Cortical age-related cataract of both eyes    Age-related nuclear cataract of both eyes    Posterior subcapsular polar age-related cataract of both eyes    Hepatomegaly    Screen for colon cancer    Primary osteoarthritis of both knees    Colon cancer screening    Iron deficiency anemia due to chronic blood loss     OB History    Para Term  AB Living   2 2 2 0 0 4   SAB IAB Ectopic Multiple Live Births   0 0 0 1 2        No LMP recorded. Patient is postmenopausal.    Hx of Pap: all Paps normal          Tobacco:  She smoked tobacco in the past but quit greater than 12 months ago.  Social History     Tobacco Use   Smoking Status Former    Current packs/day: 1.00    Types: Cigarettes   Smokeless Tobacco Never        CAGE Alcohol Screen:   CAGE screening score of 0 on 2024, showing low risk of alcohol abuse.        Patient Care Team:  Vashti Link MD as PCP - General  Jaziel Light MD as Consulting Physician (Interventional, Cardiology)  Rahel Lyn MD as Consulting Physician (OPHTHALMOLOGY)  Shannon Ackerman APRN (Nurse Practitioner)  HPI    Review of Systems   Constitutional:  Positive for fatigue. Negative for activity change, appetite change, chills, diaphoresis, fever and unexpected weight change.   HENT:  Negative for congestion, dental problem, drooling, ear discharge, ear pain, facial swelling, hearing loss, mouth sores, nosebleeds, postnasal drip, rhinorrhea, sinus pressure, sinus pain, sneezing, sore throat, tinnitus, trouble swallowing and voice change.    Eyes:  Negative for photophobia, pain, discharge, redness,  itching and visual disturbance.   Respiratory:  Positive for shortness of breath (Chronic.). Negative for apnea, cough, choking, chest tightness, wheezing and stridor.    Cardiovascular:  Negative for chest pain, palpitations and leg swelling.   Gastrointestinal:  Negative for abdominal distention, abdominal pain, anal bleeding, blood in stool, constipation, diarrhea, nausea, rectal pain and vomiting.   Endocrine: Negative for cold intolerance, heat intolerance, polydipsia, polyphagia and polyuria.   Genitourinary:  Negative for decreased urine volume, difficulty urinating, dysuria, flank pain, frequency, hematuria, menstrual problem, pelvic pain, urgency, vaginal bleeding, vaginal discharge and vaginal pain.   Skin:  Negative for rash.   Neurological:  Negative for dizziness, tremors, seizures, syncope, facial asymmetry, speech difficulty, weakness, light-headedness, numbness and headaches.   Psychiatric/Behavioral:  Positive for sleep disturbance. Negative for agitation, behavioral problems, confusion, decreased concentration, dysphoric mood, hallucinations, self-injury and suicidal ideas. The patient is nervous/anxious. The patient is not hyperactive.         Goes to bed at 1030.  Falls asleep exactly at 130.  And wakes up 3-4 times a night because mind racing.  Per patient family has not mentioned that she stops breathing nor snores.  But unrefreshing sleep in the morning.  Does do melatonin 10 mg and does not seem to really help.   All other systems reviewed and are negative.          Functional Ability/Status:   Serena Chavez has a completely normal functional assessment. See flowsheet for details.        Fall Risk Assessment:   She has been screened for Falls and is High Risk. Fall Prevention information provided to patient in After Visit Summary.    Do you feel unsteady when standing or walking?: Yes  Do you worry about falling?: Yes  Have you fallen in the past year?: Yes  How many times have you fallen?:  1  Were you injured?: No       Medicare Hearing Assessment:   Hearing Screening    Time taken: 4/22/2024 11:05 AM  Entry User: Vanesa Lam  Screening Method: Finger Rub  Finger Rub Result: Pass         Depression Screening (PHQ-2/PHQ-9): PHQ-2 SCORE: 0  , done 4/22/2024          Cognitive Assessment:   She had a completely normal cognitive assessment - see flowsheet entries       Supplementary Documentation:     In the past six months, have you lost more than 10 pounds without trying?: 1 - Yes  Has your appetite been poor?: Yes  Type of Diet: Diabetic  How does the patient maintain a good energy level?: Stretching  How would you describe your daily physical activity?: Light  How would you describe your current health state?: Fair  How do you maintain positive mental well-being?: Games  On a scale of 0 to 10, with 0 being no pain and 10 being severe pain, what is your pain level?: 2 - (Mild)  In the past six months, have you experienced urine leakage?: 0-No  At any time do you feel concerned for the safety/well-being of yourself and/or your children, in your home or elsewhere?: No  Have you had any immunizations at another office such as Influenza, Hepatitis B, Tetanus, or Pneumococcal?: No    Visual Acuity:   Right Eye Visual Acuity: Corrected Right Eye Chart Acuity: 20/50   Left Eye Visual Acuity: Corrected Left Eye Chart Acuity: 20/50   Both Eyes Visual Acuity: Corrected Both Eyes Chart Acuity: 20/50   Able To Tolerate Visual Acuity: Yes        PHYSICAL EXAM:   /64 (BP Location: Left arm, Patient Position: Sitting, Cuff Size: large)   Pulse 76   Temp 97.6 °F (36.4 °C) (Oral)   Ht 5' 2\" (1.575 m)   Wt 266 lb 12.8 oz (121 kg)   SpO2 100%   BMI 48.80 kg/m²   BP Readings from Last 3 Encounters:   04/22/24 118/64   11/04/23 135/71   10/12/23 122/78     Wt Readings from Last 3 Encounters:   04/22/24 266 lb 12.8 oz (121 kg)   11/04/23 298 lb 12.8 oz (135.5 kg)   10/12/23 291 lb (132 kg)      Body mass  index is 48.8 kg/m².   Physical Exam  Vitals and nursing note reviewed.   Constitutional:       General: She is not in acute distress.     Appearance: Normal appearance. She is well-developed and well-groomed. She is not ill-appearing, toxic-appearing or diaphoretic.      Interventions: She is not intubated.  HENT:      Head: Normocephalic and atraumatic.      Right Ear: Tympanic membrane, ear canal and external ear normal. No decreased hearing noted. No laceration, drainage, swelling or tenderness. No middle ear effusion. There is no impacted cerumen. No foreign body. No mastoid tenderness. No PE tube. No hemotympanum. Tympanic membrane is not injected, scarred, perforated, erythematous, retracted or bulging. Tympanic membrane has normal mobility.      Left Ear: Tympanic membrane, ear canal and external ear normal. No decreased hearing noted. No laceration, drainage, swelling or tenderness.  No middle ear effusion. There is no impacted cerumen. No foreign body. No mastoid tenderness. No PE tube. No hemotympanum. Tympanic membrane is not injected, scarred, perforated, erythematous, retracted or bulging. Tympanic membrane has normal mobility.      Nose:      Right Sinus: No maxillary sinus tenderness or frontal sinus tenderness.      Left Sinus: No maxillary sinus tenderness or frontal sinus tenderness.      Mouth/Throat:      Lips: Pink. No lesions.      Mouth: Mucous membranes are moist. No injury, lacerations, oral lesions or angioedema.      Dentition: Abnormal dentition (Missing front 4 teeh.). No dental tenderness, gingival swelling or gum lesions.      Tongue: No lesions. Tongue does not deviate from midline.      Palate: No mass and lesions.      Pharynx: Oropharynx is clear. Uvula midline. No pharyngeal swelling, oropharyngeal exudate, posterior oropharyngeal erythema or uvula swelling.      Tonsils: No tonsillar exudate or tonsillar abscesses.   Eyes:      General: Lids are normal. No scleral icterus.         Right eye: No foreign body, discharge or hordeolum.         Left eye: No foreign body, discharge or hordeolum.      Extraocular Movements: Extraocular movements intact.      Right eye: Normal extraocular motion and no nystagmus.      Left eye: Normal extraocular motion and no nystagmus.      Conjunctiva/sclera: Conjunctivae normal.      Right eye: Right conjunctiva is not injected. No chemosis, exudate or hemorrhage.     Left eye: Left conjunctiva is not injected. No chemosis, exudate or hemorrhage.     Pupils: Pupils are equal, round, and reactive to light.   Neck:      Thyroid: No thyroid mass, thyromegaly or thyroid tenderness.      Vascular: Normal carotid pulses. No carotid bruit, hepatojugular reflux or JVD.      Trachea: Trachea and phonation normal. No tracheal tenderness, tracheostomy, abnormal tracheal secretions or tracheal deviation.   Cardiovascular:      Rate and Rhythm: Normal rate and regular rhythm.      Pulses: Normal pulses.           Carotid pulses are 2+ on the right side and 2+ on the left side.       Radial pulses are 2+ on the right side and 2+ on the left side.        Dorsalis pedis pulses are 2+ on the right side and 2+ on the left side.        Posterior tibial pulses are 2+ on the right side and 2+ on the left side.      Heart sounds: Normal heart sounds, S1 normal and S2 normal.   Pulmonary:      Effort: Pulmonary effort is normal. No tachypnea, bradypnea, accessory muscle usage, prolonged expiration, respiratory distress or retractions. She is not intubated.      Breath sounds: Normal breath sounds and air entry. No stridor, decreased air movement or transmitted upper airway sounds. No decreased breath sounds, wheezing, rhonchi or rales.   Chest:      Chest wall: No tenderness.   Breasts:     Breasts are symmetrical.      Right: No swelling, bleeding, inverted nipple, mass, nipple discharge, skin change or tenderness.      Left: No swelling, bleeding, inverted nipple, mass, nipple  discharge, skin change or tenderness.   Abdominal:      General: Bowel sounds are normal. There is no distension.      Palpations: Abdomen is soft. Abdomen is not rigid. There is no fluid wave, hepatomegaly, splenomegaly or mass.      Tenderness: There is no abdominal tenderness. There is no right CVA tenderness, left CVA tenderness, guarding or rebound.   Genitourinary:     Exam position: Supine.   Musculoskeletal:      Cervical back: Neck supple. No tenderness.      Right lower leg: No edema.      Left lower leg: No edema.   Lymphadenopathy:      Head:      Right side of head: No submental, submandibular, preauricular, posterior auricular or occipital adenopathy.      Left side of head: No submental, submandibular, preauricular, posterior auricular or occipital adenopathy.      Cervical: No cervical adenopathy.      Right cervical: No superficial, deep or posterior cervical adenopathy.     Left cervical: No superficial, deep or posterior cervical adenopathy.      Upper Body:      Right upper body: No supraclavicular, axillary or pectoral adenopathy.      Left upper body: No supraclavicular, axillary or pectoral adenopathy.   Skin:     General: Skin is warm and dry.      Coloration: Skin is not pale.      Findings: No erythema or rash.   Neurological:      Mental Status: She is alert and oriented to person, place, and time.   Psychiatric:         Mood and Affect: Mood normal.         Speech: Speech normal.         Behavior: Behavior normal. Behavior is cooperative.     Bilateral barefoot skin diabetic exam is normal, visualized feet and the appearance is normal tinea pedis and especially in between toes.  Some cracking of the skin.  But no evidence of infection.  Thickened dystrophic nails all nails on right and left side especially left big toenail with patient has cut and caused some bleeding of the toenail.  Bilateral monofilament sensation of both feet is normal.  Pulsation pedal pulse exam of both lower  legs/feet is normal as well.     [place]: 4.   Stop bang score 5.        ASSESSMENT/PLAN:     Encounter Diagnoses   Name Primary?    Acute on chronic diastolic congestive heart failure (HCC) Stable.    Yes    Age-related nuclear cataract of both eyes No new vision changes. Fu optho at [facility].        Age-related osteoporosis without current pathological fracture Check dexa. Calcium and Vit d intake:   Steroid use,  PPI use, chronic pain medication use, Anti-epileptics use :  RA: No.   Exercise: No.   She is postmenopausal Yes.   Maternal history of osteoporosis No.   History of falls/ fractures:  No.   History of kidney stones No.     Current Smoking   Excess alcohol No.   History of thyroid disease  No.   History of calcium problems:  No.   History of Malabsorption/ bariatric surgery: No.      No extensive dental work in the past: root canals, bridges. She also has TMJ  No h/o radiation No.   H/o heart burn: not on a regular basis No.   lifelong physical activity at all ages is strongly endorsed by the National Osteopor[employer] Foundation. Exercise recommendations generally should include weight-bearing, muscle-strengthening, and balance training exercises for 30 minutes 5 days per week or 75 minutes twice weekly, often consistent with other general health recommendations.   Weight Bearing  There are two types of osteoporosis exercises that are important for building and maintaining bone density: weight-bearing and muscle-strengthening exercises.  Weight-bearing Exercises  These exercises include activities that make you move against gravity while staying upright. Weight-bearing exercises can be high-impact or low-impact.  High-impact weight-bearing exercises help build bones and keep them strong. If you have broken a bone due to osteoporosis or are at risk of breaking a bone, you may need to avoid high-impact exercises. If you’re not sure, you should check with your healthcare provider.  Examples of high-impact  weight-bearing exercises are:  Dancing   Doing high-impact aerobics   Hiking   Jogging/running   Jumping Rope   Stair climbing   Tennis  Low-impact weight-bearing exercises can also help keep bones strong and are a safe alternative if you cannot do high-impact exercises. Examples of low-impact weight-bearing exercises are:  Using elliptical training machines   Doing low-impact aerobics   Using stair-step machines   Fast walking on a treadmill or outside    Take calcium 600 every 12 hrs. With vitamin D 400 IU every  12 hrs. Excerise at least 30 minutes 3-4 times a week. May use calcium citrate as opposed to calcium carbonate which may be better absorbed in the setting of PPI use.          Anatomical narrow angle glaucoma with borderline intraocular pressure, unspecified laterality No new vision changes. FU optho.        Asthma with COPD (chronic obstructive pulmonary disease) (HCC) Stable.        Encounter for screening mammogram for malignant neoplasm of breast Check mammogram after 7-25-24. Continue self breast exam every month.         Stage 3a chronic kidney disease (HCC) No motrin, ibuprofen, advil, alleve, naprosyn  with these medications.  Check blood.        Colon cancer screening Holding FOBT and colonoscopy.  Explained importance of doing.  Patient still refusing.       Combined hyperlipidemia Check blood.        Coronary artery disease involving native coronary artery of native heart with angina pectoris (HCC) Stable.  Patient denies symptoms.  On aspirin, beta-blocker, statin.       Coronary artery disease involving coronary bypass graft of native heart with other forms of angina pectoris (HCC) Stable. Patient denies symptoms.  On aspirin, beta-blocker, statin.       Cortical age-related cataract of both eyes no new vision changes.  Follow-up with ophthalmology.       Hepatomegaly       HTN (hypertension), benign Stable. Careful with diet and excercise at least 30 minutes 3-4 times a week. Check blood  pressures at different times on different days. Can purchase own blood pressure monitor. If not, check at local pharmacy. Bake foods more and grill occasionally. Avoid fried foods. No salt. Use other seasonings.         Hyperopia with astigmatism and presbyopia, unspecified laterality no new vision changes.  Follow-up with ophthalmology at [facility] per patient.       Iron deficiency anemia due to chronic blood loss check blood.  Discussed that may need endoscopy or colonoscopy.  Waiting on blood per request.       Multinodular goiter (nontoxic) check blood work and ultrasound of thyroid.       Obesity, Class III, BMI 40-49.9 (morbid obesity) (HCC)       Posterior subcapsular polar age-related cataract of both eyes no new vision changes.  Follow-up with ophthalmology every year in [facility] per request.       Primary osteoarthritis of both knees Stable        Renal mass, left check CT abdomen and then follow-up with urology.       Screen for colon cancer per request of patient holding on fecal occult blood and also colonoscopy.       Tear film insufficiency, unspecified laterality stable.       Tinea pedis of both feet improving.  Using Vaseline.  Try not to do in between the toes only on top and bottom of toes and feet.  Check feet every morning watch for signs and symptoms of infection as discussed.       Vitamin D deficiency check blood.       Vitamin B 12 deficiency check blood.       Vaccine counseling pneumonia 20 vaccine today.  Holding on shingles vaccine.       Uncontrolled type 2 diabetes mellitus with hyperglycemia (HCC) stable.  Check blood and urine.  Follow-up with ophthalmology. Careful with diet and excercise at least 30 minutes 3-4 times a week. Check sugars at different times on different dates. Careful with low sugars. Carry something with you and check sugar if can. Can carry faisal cracker, etc. Decrease carbohydrates. But also, careful with fruits and natural sugars.One serving a day and no  more than 1 handful every day. Check feet  every AM and careful with sores and ulcers on feet bilaterally. Check eyes every year with dilated eye exam.  Check sugars.  2-hour postmeal should be less than 140s.  Pre-meal should be 's.  Both equally affected A1c.  Discussed importance of glycemic control to prevent complications of diabetes  -Discussed complications of diabetes include retinopathy, neuropathy, nephropathy and cardiovascular disease  -Discussed ABCs of DM  -Discussed importance of SBGM  -Discussed importance of low CHO diet, recommend 45gm per meal or 135gm per day  -Follow-up with optho.        Other insomnia Stick to a sleep schedule. Keep your bedtime and wake time consistent from day to day, including on weekends.   Stay active. Regular activity helps promote a good night's sleep. Schedule exercise at least a few hours before bedtime and avoid stimulating activities before bedtime.   Check your medications. If you take medications regularly, check with your doctor to see if they may be contributing to your insomnia. Also check the labels of OTC products to see if they contain caffeine or other stimulants, such as pseudoephedrine.   Avoid or limit naps. Naps can make it harder to fall asleep at night. If you can't get by without one, try to limit a nap to no more than 30 minutes and don't nap after 3 p.m.   Avoid or limit caffeine and alcohol and don't use nicotine. All of these can make it harder to sleep, and effects can last for several hours.   Avoid large meals and beverages before bed. A light snack is fine and may help avoid heartburn. Drink less liquid before bedtime so that you won't have to urinate as often.  At bedtime:  Try melatonin 10 mg 30 minutes before bed.  Make your bedroom comfortable for sleep. Only use your bedroom for sex or sleep. Keep it dark and quiet, at a comfortable temperature. Hide all clocks in your bedroom, including your wristwatch and cellphone, so you don't  worry about what time it is.   Find ways to relax. Try to put your worries and planning aside when you get into bed. A warm bath or a massage before bedtime can help prepare you for sleep. Create a relaxing bedtime ritual, such as taking a hot bath, reading, soft music, breathing exercises, yoga or prayer.   Avoid trying too hard to sleep. The harder you try, the more awake you'll become. Read in another room until you become very drowsy, then go to bed to sleep. Don't go to bed too early, before you're sleepy.   Get out of bed when you're not sleeping. Sleep as much as you need to feel rested, and then get out of bed. Don't stay in bed if you're not sleeping.       Onichomycosis.  Discussed with patient of options.  Hold podiatry evaluation.  Patient says she is using Vaseline on the nails and seems to be helping.  Wants to stick with that regimen.    Orders Placed This Encounter   Procedures    Lipid Panel    Comp Metabolic Panel (14)    Hemoglobin A1C    URINALYSIS, AUTO, W/O SCOPE    Microalb/Creat Ratio, Random Urine    Occult Blood, Fecal, FIT Immunoassay    Urinalysis, Routine    Serum Protein Electrophoresis    Immunoglobulin A/G/M, Quant    Immunofixation (JEREMIAH)    Immunoglobulin free LT chains blood    Prevnar 20 (PCV20) [21752]    Advance Care Planning- 1st 30 minutes       Meds This Visit:  Requested Prescriptions      No prescriptions requested or ordered in this encounter       Imaging & Referrals:  UROLOGY - INTERNAL  GASTRO - INTERNAL  DME - EXTERNAL   PCV20 VACCINE FOR INTRAMUSCULAR USE  US THYROID (CPT=76536)  CT ABDOMEN+PELVIS(ALL W+WO)(CPT=74178)  XR DEXA BONE DENSITOMETRY (CPT=77080)      RTC 6 months for FU.     1. Acute on chronic diastolic congestive heart failure (HCC) (Primary)  -     Urinalysis, Routine; Future; Expected date: 05/06/2024  2. Age-related nuclear cataract of both eyes  3. Age-related osteoporosis without current pathological fracture  -     XR DEXA BONE DENSITOMETRY  (CPT=77080); Future; Expected date: 04/22/2024  4. Anatomical narrow angle glaucoma with borderline intraocular pressure, unspecified laterality  5. Asthma with COPD (chronic obstructive pulmonary disease) ([facility])  6. Encounter for screening mammogram for malignant neoplasm of breast  7. Stage 3a chronic kidney disease (HCC)  8. Colon cancer screening  -     Gastro Referral - In [facility]  -     Occult Blood, Fecal, FIT Immunoassay; Future; Expected date: 04/22/2024  9. Combined hyperlipidemia  10. Coronary artery disease involving native coronary artery of native heart with angina pectoris ([facility])  Overview:  Presented with left shoulder pain.  Was admitted to [facility] non-STEMI status post PTCA of distal left circumflex stent for thrombosis.  4 hours switched from Plavix to Effient. Echo: LV systolic function normal with anterior wall hypokinesis.  Mild pulmonary hypertension.  February 15, 2023 PET myocardial perfusion imaging a pharmacological stress test is abnormal.  Medium fixed perfusion abnormality of moderate intensity in the inferior region.  LV cavity is enlarged LVEF reduced to 33% with global function moderately reduced  11. Coronary artery disease involving coronary bypass graft of native heart with other forms of angina pectoris ([facility])  Overview:  CABG in 2016 Alexian Brothers with 4 subsequent stents.  Orders:  -     DME - External  12. Cortical age-related cataract of both eyes  13. Hepatomegaly  14. HTN (hypertension), benign  -     DME - External  15. Hyperopia with astigmatism and presbyopia, unspecified laterality  16. Iron deficiency anemia due to chronic blood loss  17. Multinodular goiter (nontoxic)  -      THYROID (CPT=76536); Future; Expected date: 04/22/2024  18. Obesity, Class III, BMI 40-49.9 (morbid obesity) ([facility])  19. Posterior subcapsular polar age-related cataract of both eyes  20. Primary osteoarthritis of both knees  21. Renal mass, left  Overview:  Angiomyolipoma left kidney 5 cm.   Needs to follow annually.  Orders:  -     CT ABDOMEN+PELVIS (W AND W/O CONTRAST)(CPT=74178); Future; Expected date: 04/22/2024  -     Urology Referral - In Network  22. Screen for colon cancer  23. Tear film insufficiency, unspecified laterality  24. Tinea pedis of both feet  25. Vitamin D deficiency  26. Vitamin B 12 deficiency  27. Vaccine counseling  28. Uncontrolled type 2 diabetes mellitus with hyperglycemia (HCC)  -     Lipid Panel  -     Comp Metabolic Panel (14)  -     Hemoglobin A1C  -     URINALYSIS, AUTO, W/O SCOPE  -     Microalb/Creat Ratio, Random Urine; Future; Expected date: 04/22/2024  -     Microalb/Creat Ratio, Random Urine  29. Other insomnia  30. Other proteinuria  -     Serum Protein Electrophoresis  -     Connective Tissue Disease (ISABELLE) Screen  -     Glomerular Baement Memb IgG Ab  -     Hepatitis B Profile  -     Protein Electrophoresis w/ JEREMIAH & IgA,IgG, IgM Serum  -     Serum Protein Elect w/ reflex  31. Encounter for annual health examination  -     Advance Care Planning- 1st 30 minutes  Other orders  -     Prevnar 20 (PCV20) [83490]      The patient indicates understanding of these issues and agrees to the plan.  Consult ordered.  Further testing ordered.  Imaging studies ordered.  Lab work ordered.  Reinforced healthy diet, lifestyle, and exercise.      Return in about 6 months (around 10/22/2024), or if symptoms worsen or fail to improve.    Advanced Directives:   She does NOT have a Living Will. [Do you have a living will?: No]  She does NOT have a Power of  for Health Care. [Do you have a healthcare power of ?: No]  Discussed Advance Care Planning with patient (and family/surrogate if present). Standard forms made available to patient in After Visit Summary.  16+ minutes was spent with all Advanced Care Planning elements today (up to 30 minutes for CPT 95408)    MD Serena Juan's SCREENING SCHEDULE   Tests on this list are recommended by your  physician but may not be covered, or covered at this frequency, by your insurer.   Please check with your insurance carrier before scheduling to verify coverage.   PREVENTATIVE SERVICES FREQUENCY &  COVERAGE DETAILS LAST COMPLETION DATE   Diabetes Screening    Fasting Blood Sugar /  Glucose    One screening every 12 months if never tested or if previously tested but not diagnosed with pre-diabetes   One screening every 6 months if diagnosed with pre-diabetes Lab Results   Component Value Date    GLU 68 (L) 04/06/2023        Cardiovascular Disease Screening    Lipid Panel  Cholesterol  Lipoprotein (HDL)  Triglycerides Covered every 5 years for all Medicare beneficiaries without apparent signs or symptoms of cardiovascular disease Lab Results   Component Value Date    CHOLEST 82 12/29/2022    HDL 34 (L) 12/29/2022    LDL 32 12/29/2022    TRIG 77 12/29/2022         Electrocardiogram (EKG)   Covered if needed at Welcome to Medicare, and non-screening if indicated for medical reasons 08/31/2021      Ultrasound Screening for Abdominal Aortic Aneurysm (AAA) Covered once in a lifetime for one of the following risk factors    Men who are 65-75 years old and have ever smoked    Anyone with a family history -     Colorectal Cancer Screening  Covered for ages 50-85; only need ONE of the following:    Colonoscopy   Covered every 10 years    Covered every 2 years if patient is at high risk or previous colonoscopy was abnormal -    Health Maintenance   Topic Date Due    Colorectal Cancer Screening  Never done       Flexible Sigmoidoscopy   Covered every 4 years -    Fecal Occult Blood Test Covered annually -   Bone Density Screening    Bone density screening    Covered every 2 years after age 65 if diagnosed with risk of osteoporosis or estrogen deficiency.    Covered yearly for long-term glucocorticoid medication use (Steroids) Last Dexa Scan:    XR DEXA BONE DENSITOMETRY (CPT=77080) 03/07/2022      No recommendations at this  time   Pap and Pelvic    Pap   Covered every 2 years for women at normal risk; Annually if at high risk -  No recommendations at this time    Chlamydia Annually if high risk -  No recommendations at this time   Screening Mammogram    Mammogram     Recommend annually for all female patients aged 40 and older    One baseline mammogram covered for patients aged 35-39 01/25/2024    Health Maintenance   Topic Date Due    Mammogram  01/25/2025       Immunizations    Influenza Covered once per flu season  Please get every year -  No recommendations at this time    Pneumococcal Each vaccine (Iyovhyc96 & Hobqezrtb57) covered once after 65 Prevnar 13: 01/18/2018    Cnqyavhwx18: 07/02/2018     No recommendations at this time    Hepatitis B One screening covered for patients with certain risk factors   -  No recommendations at this time    Tetanus Toxoid Not covered by Medicare Part B unless medically necessary (cut with metal); may be covered with your pharmacy prescription benefits -    Tetanus, Diptheria and Pertusis TD and TDaP Not covered by Medicare Part B -  No recommendations at this time    Zoster Not covered by Medicare Part B; may be covered with your pharmacy  prescription benefits -  No recommendations at this time     Diabetes      Hemoglobin A1C Annually; if last result is elevated, may be asked to retest more frequently.    Medicare covers every 3 months Lab Results   Component Value Date    EAG  10/12/2023      Comment:      Sent to [facility] for testing.      A1C  10/12/2023      Comment:      Sent to [facility] for testing.        A1C 7.2 (H) 10/12/2023       Hemoglobin A1C Test due on 04/12/2024    Creat/alb ratio Annually Lab Results   Component Value Date    MICROALBCREA 1,047.6 (H) 04/06/2023       LDL Annually Lab Results   Component Value Date    LDL 32 12/29/2022       Dilated Eye Exam Annually Last Diabetic Eye Exam:  No data recorded  No data recorded       Annual Monitoring of Persistent Medications  (ACE/ARB, digoxin diuretics, anticonvulsants)    Potassium Annually Lab Results   Component Value Date    K 4.0 04/06/2023         Creatinine   Annually Lab Results   Component Value Date    CREATSERUM 0.90 04/06/2023         BUN Annually Lab Results   Component Value Date    BUN 11 04/06/2023       Drug Serum Conc Annually No results found for: \"DIGOXIN\", \"DIG\", \"VALP\"                     Understanding Advance Care Planning  Advance care planning is the process of deciding one’s own future medical care. It helps assure that if you can’t speak for yourself, your wishes can still be carried out. The plan is a series of legal documents that note a person’s wishes. The documents vary by state. Advance care planning should be discussed at a regular office visit with your primary care provider before an acute illness. Advance care planning is encouraged when a person has a serious illness that is expected to get worse. It may also be done before major surgery. And it can help you and your family be prepared in case of a major illness or injury. Advance care planning helps with making decisions at these times.     Who will speak on your behalf?  A healthcare proxy is a person who acts as the voice of a patient when the patient can’t speak for himself or herself. The name of this role varies by state. It may be called a Durable Medical Power of  or Durable Power of  for Healthcare. It may be called an agent, surrogate, or advocate. Or it may be called a representative or decision maker. It's an official duty that is identified by a legal document. The document also varies by state.   Why is advance care planning important?   If a person communicates his or her healthcare wishes:   He or she will be given medical care that matches his or her values and goals.  Family members will not be forced to make decisions in a crisis with no guidance.  Creating a plan  Making an advance care plan is often done in 3  steps:   Thinking about one’s wishes. To create an advance care plan, think about what kind of medical treatment you would want if you lose the ability to communicate. Are there any situations in which you would refuse or stop treatment? Are there therapies you would want or not want? And whom do you want to make decisions for you? There are many places to learn more about how to plan for your care. Ask your healthcare provider or  for resources.  Picking a healthcare proxy. This means choosing a trusted person to speak for you only when you can’t speak for yourself. When you can't make medical decisions, your proxy makes sure the instructions in your advance care plan are followed. A proxy doesn't make decisions based on his or her own opinions. They must put aside those opinions and values if needed, and carry out your wishes.  Filling out the legal documents. There are several kinds of legal documents for advance care planning. Each one tells healthcare providers your wishes. The documents may vary by state. They must be signed and may need to be witnessed or notarized. You can cancel or change them whenever you wish. Depending on your state, the documents may include a Healthcare Proxy form, Living Will, Durable Medical Power of , and Advance Directive.  The family’s role  The best help a family can give is to support their loved one’s wishes. Open and honest communication is vital. Family should express any concerns they have about the patient’s choices while the patient can still make decisions in the event that his or her illness prevents communicating those wishes at a later time.    [employer] last reviewed this educational content on 12/1/2019    © 5782-4886 [employer]. All rights reserved. This information is not intended as a substitute for professional medical care. Always follow your healthcare professional's instructions.          Advance Medical Directive  An advance  medical directive is a form that lets you plan ahead for the care you’d want if you could no longer express your wishes. This statement outlines the medical treatment you’d want or names the person you’d wish to make healthcare decisions for you. Be aware that laws vary from state to state, and it may be worthwhile to talk with an .     Writing down your wishes  An advance directive is important whether you’re young or old. Injury or illness can strike at any age.  Decide what is important to you and the kind of treatment you’d want, or not want to have.  Some states allow only one kind of advance directive. Some let you do both a durable power of  for healthcare and a living will. Some states put both kinds on the same form.    A durable power of  (POA) for healthcare   This form lets you name someone else that you have chosen and trust to speak and make decisions on your behalf.  This person can decide on treatment for you only when you can’t speak for yourself.  You don't need to be at the end of your life. They could speak for you if you were in a coma but were likely to recover.    A living will  This form lets you list the care you want at the end of your life.  A living will applies only if you won’t live without medical treatment. It would apply if you had advanced cancer, a massive stroke, or other serious illness from which you will not recover.  It takes effect only when you can no longer express your wishes yourself.    [employer] last reviewed this educational content on 2/1/2021 © 2000-2021 [employer]. All rights reserved. This information is not intended as a substitute for professional medical care. Always follow your healthcare professional's instructions.          Choosing an Agent  A durable power of  for healthcare is only as good as the person you name to be your agent. Your agent is the person you have chosen to speak and make decisions on your  behalf. If this person knows your treatment wishes and is willing to carry them out, you’ll probably be well represented. Be sure to tell your agent what’s important to you.     Who to choose  Here are suggestions for choosing an agent:  You can name a family member, close friend, , , or rabbi.  You should name one person as your agent. Then name one or two alternates. You need a backup person in case your first choice can’t be reached when needed.  Talk with each person you're thinking of naming as your agent or alternate. Do this before you decide who should carry out your wishes.  Your agent should be ...  A competent adult, age 18 or older  Someone you trust and can talk to about the care you want and what's important to you  Someone who supports your treatment choices    In many states, your agent can’t be ...   Your healthcare provider  An employee of your provider or of a hospital, nursing home, or hospice program where you receive care  Some states have other restrictions on who can be named as an agent for an advance directive.   Be prepared  Tip: It's a good idea to write down your wishes and give a copy to your agent and all others who are involved with your healthcare.   [employer] last reviewed this educational content on 8/1/2021 © 2000-2021 [employer]. All rights reserved. This information is not intended as a substitute for professional medical care. Always follow your healthcare professional's instructions.          Understanding DNR Orders  Do not resuscitate (DNR) orders tell hospital staff not to do potentially life-restoring measures, such as CPR, if you or your loved one's heart and lungs stop working. It allows a natural death, and prevents healthcare staff from artificially reviving a person and placing them on life support. In many states, a DNR order also applies to staff outside the hospital such as in nursing homes and emergency medical services. A DNR order  must be written by a healthcare provider. Or in some cases, certain other healthcare workers write it. This can only be done with the person’s or family’s consent. If a person has not written an advance directive, their family will decide on a DNR with the help of the healthcare team.   The person can cancel a DNR order at any time. The healthcare team can answer questions about the DNR form. Have copies of a DNR form are readily available so that your wishes can be faithfully followed.   Writing a DNR order  When might a DNR order be written? When the person’s health condition is such that, in the case of cardiac arrest, CPR and other resuscitation methods are not desired. This could be because the chance of successful resuscitation is very low. Or it could be because the care plan now focuses on comfort measures instead of life-sustaining measures. Coma and terminal illness are instances when a DNR order might be used.   Irreversible coma  In a coma, a person does not respond to sight, sound, or touch. The heart and lungs could be working, but brain function is damaged due to trauma or disease.   Terminal illness  In the last stages of heart disease, AIDS, cancer, and other illnesses, some people don’t want to prolong their suffering. If recovery isn’t likely and quality of life is poor or getting worse, a person or their family may agree to a DNR order.   DNR orders and hospice care  A hospice program can offer care during the final weeks of life. Hospice programs provide dignity, pain control and comfort care in the home or at special facilities. Hospice does not provide aggressive treatment. In fact, a DNR order will likely be discussed before a person is admitted to hospice. A  or  may be able to help you arrange for hospice support.   Documenting end-of-life wishes  In addition to DNR orders, a person with a serious, life-limiting illness may wish to document their treatment wishes.  This is called a POST form or by different names, depending on the state. It's meant to provide a portable medical order to help guide healthcare providers on the specific medical treatments a person wants during a medical emergency. It's meant to complement a DNR order, not replace it. Your healthcare provider can tell you more and help you complete the forms. The form may be called one of these:   MOLST (medical orders for life-sustaining treatment)  POLST (physician orders for life-sustaining treatment)  MOST (medical orders for scope of treatment)  POST (physician orders for scope of treatment)  TPOPP (transportable physician orders for patient preferences)  [employer] last reviewed this educational content on 7/1/2021    © 0489-4102 [employer]. All rights reserved. This information is not intended as a substitute for professional medical care. Always follow your healthcare professional's instructions.          An Agent’s Role for Durable Power of  for Health Care   It’s impossible to know which medical treatment choices you might face in the future. What if you aren't able to make these decisions for yourself? A durable power of  for healthcare lets you name an agent to speak and carry out your wishes on your behalf. This happens only if you can’t express your wishes yourself.     An agent’s duty  Your agent respects your wishes in the following ways:    Your agent’s duty is to see that your wishes are followed.  If your wishes aren’t known, your agent should try to decide what you want.  Your agent’s choices come before anyone else’s wishes for you.  A durable power of  for healthcare doesn't not give your agent control over your money . Your agent also can’t be made to pay your bills.  Find out what your agent can do  Restrictions on what an agent can and can’t do vary by state. Check your state laws. In most states your agent can:   Choose or refuse life-sustaining and  other medical treatment on your behalf  Consent to treatment, and then stop treatment if your condition doesn’t improve  Access and release your medical records  Request an autopsy and donate your organs, unless you’ve stated otherwise in your advance directive  Find out whether your state allows your agent to do the following:   Refuse or withdraw life-enhancing care  Refuse or stop tube feeding or other life-sustaining care--even if you haven’t stated on your advance directive that you don’t want these treatments  Order sterilization or   [employer] last reviewed this educational content on 2021 [employer]. All rights reserved. This information is not intended as a substitute for professional medical care. Always follow your healthcare professional's instructions.          Being a Healthcare Proxy  A healthcare proxy is someone who represents a person who can’t speak for themself. The name of this role varies by state. It may be called a Durable Medical Power of . It may be called a Durable Power of  for Healthcare. It may be called an agent, surrogate, or advocate. Or it may be called a representative or decision maker. It's an official duty that is noted by a legal document. The document also varies by state. The person must name you as his or her proxy on the document.      A healthcare proxy speaks for another person when he or she is not able to do so. The proxy helps make sure the person’s healthcare wishes are known and followed.     What it means to be a healthcare proxy   Your role as healthcare proxy starts when the person can’t make medical decisions. This assessment can only be made by a licensed doctor. You then make the healthcare decisions as needed. You do this by carrying out the person’s wishes. These wishes are noted in his or her advance care planning documents. These declare what kind of treatment the person wishes to have or not have.  You may need to put aside your own values and opinions to carry out the person’s wishes. This may include refusing or stopping life-sustaining treatments.   Documenting end-of-life wishes   As a healthcare proxy, encourage the person to discuss his or her wishes, while they are able. They can do this with their healthcare provider and then document the wishes as a medical order. The provider can help the person complete the form. The forms are known by different names depending on the state. The form may be called one of these:     MOLST (medical orders for life-sustaining treatment)  POLST (physician orders for life-sustaining treatment)  MOST (medical orders for scope of treatment)  POST (physician orders for scope of treatment)  TPOPP (transportable physician orders for patient preferences)    The form documents the person’s wishes at the end of life. It's not tied to a certain healthcare provider or facility. It's different than a living will. The form is an order written according to state regulations by a healthcare provider. To complete one, the person must express his or her wishes to an advanced healthcare provider. If the person can’t make his or her own decisions, then this is done by the person’s healthcare proxy.   Carrying out your role  Your duties depend on what the person’s advance care planning documents say. Your duties may also depend on state law. In general:   Before accepting a role as a proxy, talk with the person. Be sure you know his or her wishes. Ask questions. This will help you be his or her voice if and when it is needed.  Be sure that the person’s healthcare team knows that you are his or her proxy. Carry a copy of the document and proof of your identity.  Make sure the healthcare team has a copy of the advance care planning documents.  Talk to the healthcare team. Ask questions as often as you need. Stay informed about the person’s condition.  Ask for any help you need to understand  the medical situation. Ask about the person’s condition and prognosis. Ask about risks and benefits of tests and treatments. Find out all the facts and options.  Speak on the person’s behalf with the healthcare team when needed.  Talk with family members and keep them informed.  Know your rights. You have the right to ask for information. You can ask for consultations and second opinions. You have the right to request or refuse treatment for the person. You may be able to review his or her medical chart. You can authorize the person’s transfer to another facility. You can also request a new healthcare provider for him or her. If you are not sure what your rights are at any time, ask a .  When it’s time to make decisions  If the person’s wishes are clear in the advance care plan documents, ask for them to be carried out as noted. If they are not clear, talk with the healthcare team. Listen to the team’s recommendations. Talk with a  or counselor. It may be hard for you to make a decision at times. You may feel sad or upset about a decision. Being a healthcare proxy is not an easy role. But it's an important one. Remember that the person trusts you to carry out his or her wishes.   If you need help  Ask the healthcare team if you have trouble with a decision. The healthcare team will help you.  Encourage the person you are helping to have a conversation with their provider about their end-of-life wishes. The provider can help them fill out the form.  You may need help in resolving family conflicts. Ask the hospital or clinic , ethics consultant, or a  for help.  If you are having trouble talking with the healthcare team while the person is in the hospital, reach out to the patient relations department. Or ask to speak to the hospital ombudsman or ethics committee.    [employer] last reviewed this educational content on 9/1/2019    © 1756-8746 [employer]. All rights reserved. This information is not intended as a substitute for professional medical care. Always follow your healthcare professional's instructions.          Life Support  If you understand how specific treatments may affect your quality of life, you can decide which ones you’d choose or refuse. You may want to talk to your healthcare provider about the possible benefits and risks of treatments. The chance of good results from these therapies varies based on each individual clinical situation and can be very hard to predict. Medical treatment, if your life is in danger, falls into 3 main categories.     Life supporting  This care keeps your heart and lungs going when they can no longer work on their own.  CPR restarts your heart and lungs if they stop working.  A respirator (or ventilator) keeps you breathing. Air is pumped into your lungs through a tube that’s put into your windpipe.    Life sustaining  This care keeps you alive longer when you have an illness that can’t be cured.  Tube feeding or TPN (total parenteral nutrition) provides food and fluids through a tube or IV (intravenous). It is given if you can’t chew or swallow on your own.  Dialysis is a kidney machine that cleans your blood when your kidneys can no longer work on their own.    Life enhancing  This care controls pain and discomfort, such as nausea or trouble breathing. This type of care is not designed to prolong your life, but to enhance comfort and quality of life. Nothing is done to keep you alive longer.  Hospice care is comfort care. It might provide food and fluids by mouth or help with bathing. Hospice care is given during the last stages of a terminal illness.  Strong pain medicine can be given to help keep you comfortable.    Do Not Resuscitate (DNR)  Would you want CPR if your heart stops while you’re a patient in a hospital or nursing home? If not, talk to your healthcare provider about issuing a DNR  (Do-Not-Resuscitate) order.   DNRs and advance directives may not apply during anesthesia, in emergency rooms, or when emergency medical teams respond to a 911 call. Ask your healthcare provider how you can make sure your wishes will be followed. Also, a DNR will not prevent you from getting other kinds of needed medical care such as treatment for pain, or bleeding.   [employer]Well last reviewed this educational content on 8/1/2021 © 2000-2021 [employer]. All rights reserved. This information is not intended as a substitute for professional medical care. Always follow your healthcare professional's instructions.          Stopping Life-Sustaining Treatments  Certain treatments can help sustain life when you have a serious illness. But as your illness progresses, there may come a time when these treatments are no longer a benefit. Decisions must then be made whether to continue or stop these treatments. This can be a hard task for you and your loved ones, but know that you’re not alone. Your healthcare provider and healthcare team will guide you through these treatment decisions. They will also help answer any questions you may have.     What are life-sustaining treatments?  Life-sustaining treatments help keep you alive if a vital body function fails. These treatments can include CPR and the use of machines to help with heart, lung, or kidney function. They can also include the use of tubes to deliver food, fluids, blood, and medicines to the body. Blood transfusions and antibiotics are also types of life-sustaining treatments.   What happens if life-sustaining treatments are continued?  These treatments can help extend your life. But they will not cure your illness. If you are near the end of your life, you may find it hard to handle the side effects and problems that can occur with these treatments. In this case, the treatments may be too much of a burden on your body. They may cause more harm than  good. They may also disrupt the natural dying process and prolong suffering.   What happens if life-sustaining treatments are stopped?  If these treatments are stopped, the focus of treatment will shift to comfort care. This involves measures to control pain and other symptoms you may have. These measures are not meant to cure your illness or help you live longer. Instead, they are meant to improve your quality of life during the time you have left. If you are in the end stage of your illness, you may be referred to hospice by your healthcare provider. Hospice provides end-of-life care. This includes emotional, spiritual, and social support for you and your loved ones.   How do I decide whether to stop life-sustaining treatments?  Your healthcare provider and healthcare team will talk with you about the specific treatments that are part of your care plan. If you want, you may include family and friends in these meetings. Here are some questions to think about or ask your healthcare team:   Is there is a chance that my illness will improve? Or will it continue to get worse?  What are my goals of care? Do I want to extend the time I have left? Or do I want to focus my care on comfort and managing symptoms?  How will stopping or continuing these treatments affect my health? Will they enhance my comfort and quality of life? Or will they cause more problems?  Consider your own values or henna. Also ask for advice from those who share your values.  How do I state my decisions about life-sustaining treatments?  Once you’ve made your decision to continue or stop specific treatments, you can tell your healthcare provider directly. It's best to also put your treatment wishes in writing with advance directives. These are legal forms related to healthcare decisions. Laws about advance directives vary from state to state. Ask your healthcare provider about what forms are needed to make sure your wishes will be followed. Some  common forms include:   A durable power of  for healthcare or a healthcare proxy form.  This form lets you name a person to make treatment decisions for you when you can’t. This person is often called a healthcare proxy, medical or healthcare power of , or agent.  A living will.  This form tells others the kinds of treatment you want or don’t want if you become too ill or injured to speak for yourself.  Orders for life-sustaining treatments.  These are actual healthcare provider's orders that must be followed by other medical providers. The form belongs to you, not to the healthcare provider or hospital.). These are legal forms obtained from your healthcare provider or hospital that document your wishes. The forms are known by different names depending on the state. Common names include:  MOLST (medical orders for life-sustaining treatment)  POLST (physician orders for life-sustaining treatment)  MOST (medical orders for scope of treatment)  POST (physician orders for scope of treatment)  TPOPP (transportable physician orders for patient preferences)     Keep in mind that you can change or cancel an advance directive at any time. Make it a practice to review your decisions each time there is a change in your health or goals of care. Also be sure to tell your healthcare proxy and loved ones of any changes in your decisions.   Deciding whether to stop life-sustaining treatments for a loved one   The decision to stop treatment ideally is made with the person’s consent. If the person is not capable of making decisions and has no advance directive, the decision falls to the person’s healthcare proxy or other adult. If you need to make a decision about stopping treatment for a loved one, start by talking to his or her healthcare provider. Review the goals of care and the benefits and burdens of specific treatments on your loved one’s health. Also think about your loved one’s wishes and values. If needed,  seek advice from other healthcare team members, like a  or .   [employer] last reviewed this educational content on 12/1/2019    © 1907-8947 [employer]. All rights reserved. This information is not intended as a substitute for professional medical care. Always follow your healthcare professional's instructions.  Advance Care Planning done today:   She does NOT have a Living Will. [Do you have a living will?: No]  She does NOT have a Power of  for Health Care. [Do you have a healthcare power of ?: No]     Discussed Advance Care Planning with patient and patient declined resource information.  16+ minutes was spent with all Advanced Care Planning elements today (up to 30 minutes for CPT 53248)  Advance care planning including the explanation and discussion of advance directives standard forms performed Face to Face with patient and Family/surrogate (if present), and forms available to patient in AVS

## 2024-04-22 NOTE — PATIENT INSTRUCTIONS
ASSESSMENT/PLAN:     Encounter Diagnoses   Name Primary?   • Acute on chronic diastolic congestive heart failure (HCC) Stable.    Yes   • Age-related nuclear cataract of both eyes No new vision changes. Fu optho at [facility].       • Age-related osteoporosis without current pathological fracture Check dexa. Calcium and Vit d intake:   Steroid use,  PPI use, chronic pain medication use, Anti-epileptics use :  RA: No.   Exercise: No.   She is postmenopausal Yes.   Maternal history of osteoporosis No.   History of falls/ fractures:  No.   History of kidney stones No.     Current Smoking   Excess alcohol No.   History of thyroid disease  No.   History of calcium problems:  No.   History of Malabsorption/ bariatric surgery: No.      No extensive dental work in the past: root canals, bridges. She also has TMJ  No h/o radiation No.   H/o heart burn: not on a regular basis No.   lifelong physical activity at all ages is strongly endorsed by the [employer]opor[employer]. Exercise recommendations generally should include weight-bearing, muscle-strengthening, and balance training exercises for 30 minutes 5 days per week or 75 minutes twice weekly, often consistent with other general health recommendations.   Weight Bearing  There are two types of osteoporosis exercises that are important for building and maintaining bone density: weight-bearing and muscle-strengthening exercises.  Weight-bearing Exercises  These exercises include activities that make you move against gravity while staying upright. Weight-bearing exercises can be high-impact or low-impact.  High-impact weight-bearing exercises help build bones and keep them strong. If you have broken a bone due to osteoporosis or are at risk of breaking a bone, you may need to avoid high-impact exercises. If you’re not sure, you should check with your healthcare provider.  Examples of high-impact weight-bearing exercises are:  Dancing   Doing high-impact aerobics   Hiking    Jogging/running   Jumping Rope   Stair climbing   Tennis  Low-impact weight-bearing exercises can also help keep bones strong and are a safe alternative if you cannot do high-impact exercises. Examples of low-impact weight-bearing exercises are:  Using elliptical training machines   Doing low-impact aerobics   Using stair-step machines   Fast walking on a treadmill or outside    Take calcium 600 every 12 hrs. With vitamin D 400 IU every  12 hrs. Excerise at least 30 minutes 3-4 times a week. May use calcium citrate as opposed to calcium carbonate which may be better absorbed in the setting of PPI use.         • Anatomical narrow angle glaucoma with borderline intraocular pressure, unspecified laterality No new vision changes. FU optho.       • Asthma with COPD (chronic obstructive pulmonary disease) (HCC) Stable.       • Encounter for screening mammogram for malignant neoplasm of breast Check mammogram after 7-25-24. Continue self breast exam every month.        • Stage 3a chronic kidney disease (HCC) No motrin, ibuprofen, advil, alleve, naprosyn  with these medications.  Check blood.       • Colon cancer screening Holding FOBT and colonoscopy.       • Combined hyperlipidemia Check blood.       • Coronary artery disease involving native coronary artery of native heart with angina pectoris (HCC) Stable.  Patient denies symptoms.  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  Patient denies symptoms.  On aspirin, beta-blocker,       • Cortical age-related cataract of both eyes no new vision changes.  Follow-up with ophthalmology.      • Hepatomegaly      • HTN (hypertension), benign Stable. Careful with diet and excercise at least 30 minutes 3-4 times a week. Check blood pressures at different times on different days. Can purchase own blood pressure monitor. If not, check at local pharmacy. Bake foods more  and grill occasionally. Avoid fried foods. No salt. Use other seasonings.        • Hyperopia with astigmatism and presbyopia, unspecified laterality no new vision changes.  Follow-up with ophthalmology at [facility] per patient.      • Iron deficiency anemia due to chronic blood loss check blood.  Discussed that may need endoscopy or colonoscopy.  Waiting on blood per request.      • Multinodular goiter (nontoxic) check blood work and ultrasound of thyroid.      • Obesity, Class III, BMI 40-49.9 (morbid obesity) (HCC)      • Posterior subcapsular polar age-related cataract of both eyes no new vision changes.  Follow-up with ophthalmology every year in clinic per request.      • Primary osteoarthritis of both knees Stable       • Renal mass, left check CT abdomen and then follow-up with urology.      • Screen for colon cancer per request of patient holding on fecal occult blood and also colonoscopy.      • Tear film insufficiency, unspecified laterality stable.      • Tinea pedis of both feet improving.  Using Vaseline.  Try not to do in between the toes only on top and bottom of toes and feet.  Check feet every morning watch for signs and symptoms of infection as discussed.      • Vitamin D deficiency check blood.      • Vitamin B 12 deficiency check blood.      • Vaccine counseling pneumonia 20 vaccine today.  Holding on shingles vaccine.      • Uncontrolled type 2 diabetes mellitus with hyperglycemia (HCC) stable.  Check blood and urine.  Follow-up with ophthalmology. Careful with diet and excercise at least 30 minutes 3-4 times a week. Check sugars at different times on different dates. Careful with low sugars. Carry something with you and check sugar if can. Can carry faisal cracker, etc. Decrease carbohydrates. But also, careful with fruits and natural sugars.One serving a day and no more than 1 handful every day. Check feet  every AM and careful with sores and ulcers on feet bilaterally. Check eyes  every year with dilated eye exam.  Check sugars.  2-hour postmeal should be less than 140s.  Pre-meal should be 's.  Both equally affected A1c.  Discussed importance of glycemic control to prevent complications of diabetes  -Discussed complications of diabetes include retinopathy, neuropathy, nephropathy and cardiovascular disease  -Discussed ABCs of DM  -Discussed importance of SBGM  -Discussed importance of low CHO diet, recommend 45gm per meal or 135gm per day  -Follow-up with optho.       • Other insomnia Stick to a sleep schedule. Keep your bedtime and wake time consistent from day to day, including on weekends.   Stay active. Regular activity helps promote a good night's sleep. Schedule exercise at least a few hours before bedtime and avoid stimulating activities before bedtime.   Check your medications. If you take medications regularly, check with your doctor to see if they may be contributing to your insomnia. Also check the labels of OTC products to see if they contain caffeine or other stimulants, such as pseudoephedrine.   Avoid or limit naps. Naps can make it harder to fall asleep at night. If you can't get by without one, try to limit a nap to no more than 30 minutes and don't nap after 3 p.m.   Avoid or limit caffeine and alcohol and don't use nicotine. All of these can make it harder to sleep, and effects can last for several hours.   Avoid large meals and beverages before bed. A light snack is fine and may help avoid heartburn. Drink less liquid before bedtime so that you won't have to urinate as often.  At bedtime:  Try melatonin 10 mg 30 minutes before bed.  Make your bedroom comfortable for sleep. Only use your bedroom for sex or sleep. Keep it dark and quiet, at a comfortable temperature. Hide all clocks in your bedroom, including your wristwatch and cellphone, so you don't worry about what time it is.   Find ways to relax. Try to put your worries and planning aside when you get into bed.  A warm bath or a massage before bedtime can help prepare you for sleep. Create a relaxing bedtime ritual, such as taking a hot bath, reading, soft music, breathing exercises, yoga or prayer.   Avoid trying too hard to sleep. The harder you try, the more awake you'll become. Read in another room until you become very drowsy, then go to bed to sleep. Don't go to bed too early, before you're sleepy.   Get out of bed when you're not sleeping. Sleep as much as you need to feel rested, and then get out of bed. Don't stay in bed if you're not sleeping.       Onichomycosis.  Discussed with patient of options.  Hold podiatry evaluation.  Patient says she is using Vaseline on the nails and seems to be helping.  Wants to stick with that regimen.    Orders Placed This Encounter   Procedures   • Lipid Panel   • Comp Metabolic Panel (14)   • Hemoglobin A1C   • URINALYSIS, AUTO, W/O SCOPE   • Microalb/Creat Ratio, Random Urine   • Occult Blood, Fecal, FIT Immunoassay   • Urinalysis, Routine   • Prevnar 20 (PCV20) [80607]       Meds This Visit:  Requested Prescriptions      No prescriptions requested or ordered in this encounter       Imaging & Referrals:  UROLOGY - INTERNAL  GASTRO - INTERNAL  DME - EXTERNAL   PCV20 VACCINE FOR INTRAMUSCULAR USE  US THYROID (CPT=76536)  CT ABDOMEN+PELVIS(ALL W+WO)(CPT=74178)  XR DEXA BONE DENSITOMETRY (CPT=77080)      RTC 6 months for FU.   Serena Chavez's SCREENING SCHEDULE   Tests on this list are recommended by your physician but may not be covered, or covered at this frequency, by your insurer.   Please check with your insurance carrier before scheduling to verify coverage.   PREVENTATIVE SERVICES FREQUENCY &  COVERAGE DETAILS LAST COMPLETION DATE   Diabetes Screening    Fasting Blood Sugar /  Glucose    One screening every 12 months if never tested or if previously tested but not diagnosed with pre-diabetes   One screening every 6 months if diagnosed with pre-diabetes Lab Results   Component  Value Date    GLU 68 (L) 04/06/2023        Cardiovascular Disease Screening    Lipid Panel  Cholesterol  Lipoprotein (HDL)  Triglycerides Covered every 5 years for all Medicare beneficiaries without apparent signs or symptoms of cardiovascular disease Lab Results   Component Value Date    CHOLEST 82 12/29/2022    HDL 34 (L) 12/29/2022    LDL 32 12/29/2022    TRIG 77 12/29/2022         Electrocardiogram (EKG)   Covered if needed at Welcome to Medicare, and non-screening if indicated for medical reasons 08/31/2021      Ultrasound Screening for Abdominal Aortic Aneurysm (AAA) Covered once in a lifetime for one of the following risk factors   • Men who are 65-75 years old and have ever smoked   • Anyone with a family history -     Colorectal Cancer Screening  Covered for ages 50-85; only need ONE of the following:    Colonoscopy   Covered every 10 years    Covered every 2 years if patient is at high risk or previous colonoscopy was abnormal -    Health Maintenance   Topic Date Due   • Colorectal Cancer Screening  Never done       Flexible Sigmoidoscopy   Covered every 4 years -    Fecal Occult Blood Test Covered annually -   Bone Density Screening    Bone density screening    Covered every 2 years after age 65 if diagnosed with risk of osteoporosis or estrogen deficiency.    Covered yearly for long-term glucocorticoid medication use (Steroids) Last Dexa Scan:    XR DEXA BONE DENSITOMETRY (CPT=77080) 03/07/2022      No recommendations at this time   Pap and Pelvic    Pap   Covered every 2 years for women at normal risk; Annually if at high risk -  No recommendations at this time    Chlamydia Annually if high risk -  No recommendations at this time   Screening Mammogram    Mammogram     Recommend annually for all female patients aged 40 and older    One baseline mammogram covered for patients aged 35-39 01/25/2024    Health Maintenance   Topic Date Due   • Mammogram  01/25/2025       Immunizations    Influenza Covered  once per flu season  Please get every year -  No recommendations at this time    Pneumococcal Each vaccine ([id number] & [id number]) covered once after 65 Prevnar 13: 01/18/2018    Trkigsjfb21: 07/02/2018     No recommendations at this time    Hepatitis B One screening covered for patients with certain risk factors   -  No recommendations at this time    Tetanus Toxoid Not covered by Medicare Part B unless medically necessary (cut with metal); may be covered with your pharmacy prescription benefits -    Tetanus, Diptheria and Pertusis TD and TDaP Not covered by Medicare Part B -  No recommendations at this time    Zoster Not covered by Medicare Part B; may be covered with your pharmacy  prescription benefits -  No recommendations at this time     Diabetes      Hemoglobin A1C Annually; if last result is elevated, may be asked to retest more frequently.    Medicare covers every 3 months Lab Results   Component Value Date    EAG  10/12/2023      Comment:      Sent to [facility] for testing.      A1C  10/12/2023      Comment:      Sent to [facility] for testing.        A1C 7.2 (H) 10/12/2023       Hemoglobin A1C Test due on 04/12/2024    Creat/alb ratio Annually Lab Results   Component Value Date    MICROALBCREA 1,047.6 (H) 04/06/2023       LDL Annually Lab Results   Component Value Date    LDL 32 12/29/2022       Dilated Eye Exam Annually Last Diabetic Eye Exam:  No data recorded  No data recorded       Annual Monitoring of Persistent Medications (ACE/ARB, digoxin diuretics, anticonvulsants)    Potassium Annually Lab Results   Component Value Date    K 4.0 04/06/2023         Creatinine   Annually Lab Results   Component Value Date    CREATSERUM 0.90 04/06/2023         BUN Annually Lab Results   Component Value Date    BUN 11 04/06/2023       Drug Serum Conc Annually No results found for: \"DIGOXIN\", \"DIG\", \"VALP\"         Serena Chavez's SCREENING SCHEDULE   Tests on this list are recommended by your physician but may not be  covered, or covered at this frequency, by your insurer.   Please check with your insurance carrier before scheduling to verify coverage.   PREVENTATIVE SERVICES FREQUENCY &  COVERAGE DETAILS LAST COMPLETION DATE   Diabetes Screening    Fasting Blood Sugar /  Glucose    One screening every 12 months if never tested or if previously tested but not diagnosed with pre-diabetes   One screening every 6 months if diagnosed with pre-diabetes Lab Results   Component Value Date    GLU 68 (L) 04/06/2023        Cardiovascular Disease Screening    Lipid Panel  Cholesterol  Lipoprotein (HDL)  Triglycerides Covered every 5 years for all Medicare beneficiaries without apparent signs or symptoms of cardiovascular disease Lab Results   Component Value Date    CHOLEST 82 12/29/2022    HDL 34 (L) 12/29/2022    LDL 32 12/29/2022    TRIG 77 12/29/2022         Electrocardiogram (EKG)   Covered if needed at Welcome to Medicare, and non-screening if indicated for medical reasons 08/31/2021      Ultrasound Screening for Abdominal Aortic Aneurysm (AAA) Covered once in a lifetime for one of the following risk factors   • Men who are 65-75 years old and have ever smoked   • Anyone with a family history -     Colorectal Cancer Screening  Covered for ages 50-85; only need ONE of the following:    Colonoscopy   Covered every 10 years    Covered every 2 years if patient is at high risk or previous colonoscopy was abnormal -    Health Maintenance   Topic Date Due   • Colorectal Cancer Screening  Never done       Flexible Sigmoidoscopy   Covered every 4 years -    Fecal Occult Blood Test Covered annually -   Bone Density Screening    Bone density screening    Covered every 2 years after age 65 if diagnosed with risk of osteoporosis or estrogen deficiency.    Covered yearly for long-term glucocorticoid medication use (Steroids) Last Dexa Scan:    XR DEXA BONE DENSITOMETRY (CPT=77080) 03/07/2022      No recommendations at this time   Pap and Pelvic     Pap   Covered every 2 years for women at normal risk; Annually if at high risk -  No recommendations at this time    Chlamydia Annually if high risk -  No recommendations at this time   Screening Mammogram    Mammogram     Recommend annually for all female patients aged 40 and older    One baseline mammogram covered for patients aged 35-39 01/25/2024    Health Maintenance   Topic Date Due   • Mammogram  01/25/2025       Immunizations    Influenza Covered once per flu season  Please get every year -  No recommendations at this time    Pneumococcal Each vaccine ([id number] & [id number]) covered once after 65 Prevnar 13: 01/18/2018    Fpfupvide72: 07/02/2018     No recommendations at this time    Hepatitis B One screening covered for patients with certain risk factors   -  No recommendations at this time    Tetanus Toxoid Not covered by Medicare Part B unless medically necessary (cut with metal); may be covered with your pharmacy prescription benefits -    Tetanus, Diptheria and Pertusis TD and TDaP Not covered by Medicare Part B -  No recommendations at this time    Zoster Not covered by Medicare Part B; may be covered with your pharmacy  prescription benefits -  No recommendations at this time     Diabetes      Hemoglobin A1C Annually; if last result is elevated, may be asked to retest more frequently.    Medicare covers every 3 months Lab Results   Component Value Date    EAG  10/12/2023      Comment:      Sent to [facility] for testing.      A1C  10/12/2023      Comment:      Sent to [facility] for testing.        A1C 7.2 (H) 10/12/2023       Hemoglobin A1C Test due on 04/12/2024    Creat/alb ratio Annually Lab Results   Component Value Date    MICROALBCREA 1,047.6 (H) 04/06/2023       LDL Annually Lab Results   Component Value Date    LDL 32 12/29/2022       Dilated Eye Exam Annually Last Diabetic Eye Exam:  No data recorded  No data recorded       Annual Monitoring of Persistent Medications (ACE/ARB, digoxin diuretics,  anticonvulsants)    Potassium Annually Lab Results   Component Value Date    K 4.0 04/06/2023         Creatinine   Annually Lab Results   Component Value Date    CREATSERUM 0.90 04/06/2023         BUN Annually Lab Results   Component Value Date    BUN 11 04/06/2023       Drug Serum Conc Annually No results found for: \"DIGOXIN\", \"DIG\", \"VALP\"

## 2024-04-23 LAB
KAPPA LC FREE SER-MCNC: 4.66 MG/DL (ref 0.33–1.94)
KAPPA LC FREE/LAMBDA FREE SER NEPH: 2.01 {RATIO} (ref 0.26–1.65)
LAMBDA LC FREE SERPL-MCNC: 2.31 MG/DL (ref 0.57–2.63)
VIT D+METAB SERPL-MCNC: 50.8 NG/ML (ref 30–100)

## 2024-04-23 NOTE — PROGRESS NOTES
Subjective:   Serena Chavez is a 73 year old female who presents for a {Medicare Annual Wellness Description:3401} and {Medicare AWV HPI for better documentation of chronic or new problems to help justify LOS with AWV codes:33936}.   ***    History/Other:   Fall Risk Assessment: {[facility]  Fall Risk Assessment:8307}  She has been screened for Falls and is High Risk. Fall Prevention information provided to patient in After Visit Summary.    Do you feel unsteady when standing or walking?: Yes  Do you worry about falling?: Yes  Have you fallen in the past year?: Yes  How many times have you fallen?: 1  Were you injured?: No     Cognitive Assessment: {Tip  Cognitive Assessment:8307}  She had a completely normal cognitive assessment - see flowsheet entries     Functional Ability/Status: {Tip  Functional Status:8307}  Serena Chavez has a completely normal functional assessment. See flowsheet for details.        Depression Screening (PHQ-2/PHQ-9): {Tip  Depression Screenin}PHQ-2 SCORE: 0  , done 2024        {Option to record time spent screening & counseling patient for depression (5+ minutes = ):38414}    Advanced Directives: {Tip  Advance Care Plannin}  She does NOT have a Living Will. [Do you have a living will?: No]  She does NOT have a Power of  for Health Care. [Do you have a healthcare power of ?: No]  {Advanced Directive Status:7286}    {Tip ResultsPMHPSHFHProbListImagingCardioLabAllergiesImm :8307}  Patient Active Problem List   Diagnosis   • Multinodular goiter (nontoxic)   • CAD (coronary artery disease) of bypass graft   • HTN (hypertension), benign   • Obesity, Class III, BMI 40-49.9 (morbid obesity) ([facility])   • Asthma with COPD (chronic obstructive pulmonary disease) ([facility])   • Hyperopia with astigmatism and presbyopia   • Breast cancer screening   • Uncontrolled type 2 diabetes mellitus with hyperglycemia ([facility])   • Vitamin B 12 deficiency   • Combined  hyperlipidemia   • Anatomical narrow angle glaucoma with borderline intraocular pressure   • Tear film insufficiency   • Age-related osteoporosis without current pathological fracture   • Vitamin D deficiency   • Tinea pedis of both feet   • Renal mass, left   • Vaccine counseling   • Acute on chronic diastolic congestive heart failure (HCC)   • Coronary artery disease involving native coronary artery of native heart with angina pectoris (HCC)   • CKD (chronic kidney disease) stage 3, GFR 30-59 ml/min (HCC)   • Cortical age-related cataract of both eyes   • Age-related nuclear cataract of both eyes   • Posterior subcapsular polar age-related cataract of both eyes   • Hepatomegaly   • Screen for colon cancer   • Primary osteoarthritis of both knees   • Colon cancer screening   • Iron deficiency anemia due to chronic blood loss     Allergies:  She has no allergies on file.    Current Medications:  Outpatient Medications Marked as Taking for the 4/22/24 encounter (Office Visit) with Vashti Link MD   Medication Sig   • metFORMIN HCl 1000 MG Oral Tab Take 1 tablet (1,000 mg total) by mouth 2 (two) times daily with meals.   • atorvastatin 40 MG Oral Tab Take 1 tablet (40 mg total) by mouth nightly.   • metoprolol tartrate 25 MG Oral Tab Take 1 tablet (25 mg total) by mouth 2 (two) times daily.   • diclofenac 1 % External Gel Apply 2 g topically 4 (four) times daily.   • albuterol (2.5 MG/3ML) 0.083% Inhalation Nebu Soln Take 3 mL (2.5 mg total) by nebulization every 4 (four) hours as needed for Wheezing.   • Insulin Pen Needle (BD PEN NEEDLE ORIGINAL U/F) 29G X 12.7MM Does not apply Misc Uses qweek.   • semaglutide 2 MG/3ML Subcutaneous Solution Pen-injector Inject 0.25 mg into the skin once a week.   • Cholecalciferol (VITAMIN D3) 1.25 MG (24856 UT) Oral Cap 1 po qweek.   • fluticasone-salmeterol (ADVAIR DISKUS) 250-50 MCG/ACT Inhalation Aerosol Powder, Breath Activated Inhale 1 puff into the lungs Q12H.   •  lisinopril 5 MG Oral Tab Take 1 tablet (5 mg total) by mouth daily.   • amLODIPine 5 MG Oral Tab Take 1 tablet (5 mg total) by mouth daily.   • furosemide 20 MG Oral Tab Take 1 tablet (20 mg total) by mouth 2 (two) times daily.   • naproxen (ALEVE) 220 MG Oral Tab Aleve 220 mg tablet, [RxNorm: 554864]   • clotrimazole-betamethasone 1-0.05 % External Cream Apply 1 Application topically 2 (two) times daily. Apply under breasts and on feet BL but NOT between toes.   • Insulin Pen Needle (BD PEN NEEDLE ALEKSANDER 2ND GEN) 32G X 4 MM Does not apply Misc INJECT TWICE A DAY   • albuterol 108 (90 Base) MCG/ACT Inhalation Aero Soln Inhale 2 puffs into the lungs every 4 (four) hours as needed for Wheezing.   • Blood Gluc Meter Disp-Strips Does not apply Device Checks Q12hrs. Code: E11.65   • Glucose Blood (ACCU-CHEK JULIO PLUS) In Vitro Strip DX. E11.65. CHECK BLOOD GLUCOSE EVERY MORNING   • insulin glargine (LANTUS SOLOSTAR) 100 UNIT/ML Subcutaneous Solution Pen-injector Inject 55 Units into the skin 2 (two) times daily.   • nitroGLYCERIN 0.4 MG Sublingual SL Tab Place 1 tablet (0.4 mg total) under the tongue every 5 (five) minutes as needed for Chest pain.   • prasugrel 10 MG Oral Tab Take 1 tablet (10 mg total) by mouth daily.   • Blood Glucose Monitoring Suppl (ACCU-CHEK GUIDE) w/Device Does not apply Kit Inject 1 kit as directed 2 (two) times daily. Checks Q12hrs. Code: E11.65   • Blood Glucose Monitoring Suppl (BLOOD GLUCOSE SYSTEM JW) Does not apply Kit Checks Q12hrs. Code: E11.65   • DICLOFENAC SODIUM 1 % Transdermal Gel APPLY 2 GRAMS TO AFFECTED AREA 4 TIMES A DAY   • acetaminophen 500 MG Oral Tab Take 1 tablet (500 mg total) by mouth every 6 (six) hours as needed for Pain.   • Lancet Devices (SIMPLE DIAGNOSTICS LANCING DEV) Does not apply Misc Checks sugar twice a day Dx E11.65, Z79.4   • COMFORT EZ PEN NEEDLES 31G X 6 MM Does not apply Misc q12   • aspirin 81 MG Oral Tab Take 2 tablets (162 mg total) by mouth daily.   •  Aspirin-Acetaminophen-Caffeine (EXCEDRIN OR) Take 2 tablets by mouth every 6 (six) hours as needed.       Current Facility-Administered Medications for the 4/22/24 encounter (Office Visit) with Vashti Link MD   Medication   • Albuterol Sulfate (ACCUNEB) 1.25 MG/3ML nebulizer solution 1.25 mg       Medical History:  She  has a past medical history of Asthma ([facility]), Asthma with COPD with exacerbation ([facility]) (3/2/2021), COPD (chronic obstructive pulmonary disease) ([facility]), Coronary atherosclerosis, Diabetes ([facility]), Grieving (9/26/2019), High blood pressure, and High cholesterol.  Surgical History:  She  has a past surgical history that includes tubal ligation; anesth,open heart surgery (10/03/2016); cabg; cath percutaneous  transluminal coronary angioplasty; and cath drug eluting stent.   Family History:  Her family history includes Breast Cancer (age of onset: 62) in her mother; Diabetes in her mother; Heart Disorder in her father and mother; Heart Disorder (age of onset: 29) in her daughter; Hypertension in her father.  Social History:  She  reports that she has quit smoking. Her smoking use included cigarettes. She has never used smokeless tobacco. She reports that she does not drink alcohol and does not use drugs.    Tobacco:  She smoked tobacco in the past but quit greater than 12 months ago.  Social History     Tobacco Use   Smoking Status Former   • Current packs/day: 1.00   • Types: Cigarettes   Smokeless Tobacco Never          CAGE Alcohol Screen: {Tip  CAGE Alcohol Screen:8307}  CAGE screening score of 0 on 4/22/2024, showing low risk of alcohol abuse.    {Tip   Care Team:8307}  Patient Care Team:  Vashti Link MD as PCP - General  Jaziel Light MD as Consulting Physician (Interventional, Cardiology)  Rahel Lyn MD as Consulting Physician (OPHTHALMOLOGY)  Shannon Ackerman APRN (Nurse Practitioner)    Review of Systems  {Female Review of Systems (Optional):2215}    Objective:   Physical  Exam  {Use this to document a Female Complete Physical Exam (pelvic deferred) - Defaults to Blank -DEL to delete as it is not needed for AWV but is needed for CPX or Medicare Supervisit:6118}    /64 (BP Location: Left arm, Patient Position: Sitting, Cuff Size: large)   Pulse 76   Temp 97.6 °F (36.4 °C) (Oral)   Ht 5' 2\" (1.575 m)   Wt 266 lb 12.8 oz (121 kg)   SpO2 100%   BMI 48.80 kg/m²  Estimated body mass index is 48.8 kg/m² as calculated from the following:    Height as of this encounter: 5' 2\" (1.575 m).    Weight as of this encounter: 266 lb 12.8 oz (121 kg).    Medicare Hearing Assessment: {Tip (Required for AWV/SWV) Hearing Assessment:8307}  Hearing Screening    Time taken: 4/22/2024 11:05 AM  Entry User: Vanesa Lam  Screening Method: Finger Rub  Finger Rub Result: Pass         Visual Acuity:   Right Eye Visual Acuity: Corrected Right Eye Chart Acuity: 20/50   Left Eye Visual Acuity: Corrected Left Eye Chart Acuity: 20/50   Both Eyes Visual Acuity: Corrected Both Eyes Chart Acuity: 20/50   Able To Tolerate Visual Acuity: Yes        Assessment & Plan:   Serena Chavez is a 73 year old female who presents for a Medicare Assessment.     1. Acute on chronic diastolic congestive heart failure (HCC) (Primary)  -     Urinalysis, Routine; Future; Expected date: 05/06/2024  2. Age-related nuclear cataract of both eyes  3. Age-related osteoporosis without current pathological fracture  -     XR DEXA BONE DENSITOMETRY (CPT=77080); Future; Expected date: 04/22/2024  4. Anatomical narrow angle glaucoma with borderline intraocular pressure, unspecified laterality  5. Asthma with COPD (chronic obstructive pulmonary disease) (HCC)  6. Encounter for screening mammogram for malignant neoplasm of breast  7. Stage 3a chronic kidney disease (HCC)  8. Colon cancer screening  -     Gastro Referral - In Network  -     Occult Blood, Fecal, FIT Immunoassay; Future; Expected date: 04/22/2024  9. Combined  hyperlipidemia  10. Coronary artery disease involving native coronary artery of native heart with angina pectoris (HCC)  Overview:  Presented with left shoulder pain.  Was admitted to [facility] non-STEMI status post PTCA of distal left circumflex stent for thrombosis.  4 hours switched from Plavix to Effient. Echo: LV systolic function normal with anterior wall hypokinesis.  Mild pulmonary hypertension.  February 15, 2023 PET myocardial perfusion imaging a pharmacological stress test is abnormal.  Medium fixed perfusion abnormality of moderate intensity in the inferior region.  LV cavity is enlarged LVEF reduced to 33% with global function moderately reduced  11. Coronary artery disease involving coronary bypass graft of native heart with other forms of angina pectoris (HCC)  Overview:  CABG in 2016 [facility] with 4 subsequent stents.  Orders:  -     DME - External  12. Cortical age-related cataract of both eyes  13. Hepatomegaly  14. HTN (hypertension), benign  -     DME - External  15. Hyperopia with astigmatism and presbyopia, unspecified laterality  16. Iron deficiency anemia due to chronic blood loss  17. Multinodular goiter (nontoxic)  -     US THYROID (CPT=76536); Future; Expected date: 04/22/2024  18. Obesity, Class III, BMI 40-49.9 (morbid obesity) ([facility])  19. Posterior subcapsular polar age-related cataract of both eyes  20. Primary osteoarthritis of both knees  21. Renal mass, left  Overview:  Angiomyolipoma left kidney 5 cm.  Needs to follow annually.  Orders:  -     CT ABDOMEN+PELVIS (W AND W/O CONTRAST)(CPT=74178); Future; Expected date: 04/22/2024  -     Urology Referral - In Network  22. Screen for colon cancer  23. Tear film insufficiency, unspecified laterality  24. Tinea pedis of both feet  25. Vitamin D deficiency  26. Vitamin B 12 deficiency  27. Vaccine counseling  28. Uncontrolled type 2 diabetes mellitus with hyperglycemia (HCC)  -     Lipid Panel  -     Comp Metabolic Panel (14)  -      Hemoglobin A1C  -     URINALYSIS, AUTO, W/O SCOPE  -     Microalb/Creat Ratio, Random Urine; Future; Expected date: 04/22/2024  -     Microalb/Creat Ratio, Random Urine  29. Other insomnia  30. Other proteinuria  -     Serum Protein Electrophoresis  -     Connective Tissue Disease (ISABELLE) Screen  -     Glomerular Baement Memb IgG Ab  -     Hepatitis B Profile  -     Protein Electrophoresis w/ JEREMIAH & IgA,IgG, IgM Serum  -     Serum Protein Elect w/ reflex  31. Encounter for annual health examination  Other orders  -     Prevnar 20 (PCV20) [15099]    The patient indicates understanding of these issues and agrees to the plan.  {lifestyle and A/P options:5845::\"Reinforced healthy diet, lifestyle, and exercise.\"}    {Tip  Follow Up:6407}  Return in about 6 months (around 10/22/2024), or if symptoms worsen or fail to improve.     Vashti Link MD, 4/22/2024     Supplementary Documentation:   General Health:  In the past six months, have you lost more than 10 pounds without trying?: 1 - Yes  Has your appetite been poor?: Yes  Type of Diet: Diabetic  How does the patient maintain a good energy level?: Stretching  How would you describe your daily physical activity?: Light  How would you describe your current health state?: Fair  How do you maintain positive mental well-being?: Games  On a scale of 0 to 10, with 0 being no pain and 10 being severe pain, what is your pain level?: 2 - (Mild)  In the past six months, have you experienced urine leakage?: 0-No  At any time do you feel concerned for the safety/well-being of yourself and/or your children, in your home or elsewhere?: No  Have you had any immunizations at another office such as Influenza, Hepatitis B, Tetanus, or Pneumococcal?: No       Serena Chavez's SCREENING SCHEDULE   Tests on this list are recommended by your physician but may not be covered, or covered at this frequency, by your insurer.   Please check with your insurance carrier before scheduling to verify  coverage.   PREVENTATIVE SERVICES FREQUENCY &  COVERAGE DETAILS LAST COMPLETION DATE   Diabetes Screening    Fasting Blood Sugar /  Glucose    One screening every 12 months if never tested or if previously tested but not diagnosed with pre-diabetes   One screening every 6 months if diagnosed with pre-diabetes Lab Results   Component Value Date    GLU 68 (L) 04/06/2023        Cardiovascular Disease Screening    Lipid Panel  Cholesterol  Lipoprotein (HDL)  Triglycerides Covered every 5 years for all Medicare beneficiaries without apparent signs or symptoms of cardiovascular disease Lab Results   Component Value Date    CHOLEST 82 12/29/2022    HDL 34 (L) 12/29/2022    LDL 32 12/29/2022    TRIG 77 12/29/2022         Electrocardiogram (EKG)   Covered if needed at Welcome to Medicare, and non-screening if indicated for medical reasons 08/31/2021      Ultrasound Screening for Abdominal Aortic Aneurysm (AAA) Covered once in a lifetime for one of the following risk factors   • Men who are 65-75 years old and have ever smoked   • Anyone with a family history -     Colorectal Cancer Screening  Covered for ages 50-85; only need ONE of the following:    Colonoscopy   Covered every 10 years    Covered every 2 years if patient is at high risk or previous colonoscopy was abnormal -    Health Maintenance   Topic Date Due   • Colorectal Cancer Screening  Never done       Flexible Sigmoidoscopy   Covered every 4 years -    Fecal Occult Blood Test Covered annually -   Bone Density Screening    Bone density screening    Covered every 2 years after age 65 if diagnosed with risk of osteoporosis or estrogen deficiency.    Covered yearly for long-term glucocorticoid medication use (Steroids) Last Dexa Scan:    XR DEXA BONE DENSITOMETRY (CPT=77080) 03/07/2022      No recommendations at this time   Pap and Pelvic    Pap   Covered every 2 years for women at normal risk; Annually if at high risk -  No recommendations at this time    Chlamydia  Annually if high risk -  No recommendations at this time   Screening Mammogram    Mammogram     Recommend annually for all female patients aged 40 and older    One baseline mammogram covered for patients aged 35-39 01/25/2024    Health Maintenance   Topic Date Due   • Mammogram  01/25/2025       Immunizations    Influenza Covered once per flu season  Please get every year -  No recommendations at this time    Pneumococcal Each vaccine ([id number] & Motqixiga61) covered once after 65 Prevnar 13: 01/18/2018    [id number]: 07/02/2018     No recommendations at this time    Hepatitis B One screening covered for patients with certain risk factors   -  No recommendations at this time    Tetanus Toxoid Not covered by Medicare Part B unless medically necessary (cut with metal); may be covered with your pharmacy prescription benefits -    Tetanus, Diptheria and Pertusis TD and TDaP Not covered by Medicare Part B -  No recommendations at this time    Zoster Not covered by Medicare Part B; may be covered with your pharmacy  prescription benefits -  No recommendations at this time     Diabetes      Hemoglobin A1C Annually; if last result is elevated, may be asked to retest more frequently.    Medicare covers every 3 months Lab Results   Component Value Date    EAG  10/12/2023      Comment:      Sent to [facility] for testing.      A1C  10/12/2023      Comment:      Sent to [facility] for testing.        A1C 7.2 (H) 10/12/2023       Hemoglobin A1C Test due on 04/12/2024    Creat/alb ratio Annually Lab Results   Component Value Date    MICROALBCREA 1,047.6 (H) 04/06/2023       LDL Annually Lab Results   Component Value Date    LDL 32 12/29/2022       Dilated Eye Exam Annually Last Diabetic Eye Exam:  No data recorded  No data recorded       Annual Monitoring of Persistent Medications (ACE/ARB, digoxin diuretics, anticonvulsants)    Potassium Annually Lab Results   Component Value Date    K 4.0 04/06/2023         Creatinine   Annually  Lab Results   Component Value Date    CREATSERUM 0.90 04/06/2023         BUN Annually Lab Results   Component Value Date    BUN 11 04/06/2023       Drug Serum Conc Annually No results found for: \"DIGOXIN\", \"DIG\", \"VALP\"

## 2024-04-25 LAB
ALBUMIN SERPL ELPH-MCNC: 3.91 G/DL (ref 3.75–5.21)
ALBUMIN/GLOB SERPL: 1.12 {RATIO} (ref 1–2)
ALPHA1 GLOB SERPL ELPH-MCNC: 0.34 G/DL (ref 0.19–0.46)
ALPHA2 GLOB SERPL ELPH-MCNC: 1.12 G/DL (ref 0.48–1.05)
ANTI-GBM ANTIBODIES: <0.2 UNITS
B-GLOBULIN SERPL ELPH-MCNC: 1.01 G/DL (ref 0.68–1.23)
DSDNA IGG SERPL IA-ACNC: 0.7 IU/ML
ENA AB SER QL IA: 0.2 UG/L
ENA AB SER QL IA: NEGATIVE
GAMMA GLOB SERPL ELPH-MCNC: 1.01 G/DL (ref 0.62–1.7)
PROT SERPL-MCNC: 7.4 G/DL (ref 5.7–8.2)

## 2024-06-05 DIAGNOSIS — E55.9 VITAMIN D DEFICIENCY: ICD-10-CM

## 2024-06-10 RX ORDER — AMLODIPINE BESYLATE 5 MG/1
5 TABLET ORAL DAILY
Qty: 90 TABLET | Refills: 3 | Status: SHIPPED | OUTPATIENT
Start: 2024-06-10

## 2024-06-10 RX ORDER — PEN NEEDLE, DIABETIC 32GX 5/32"
1 NEEDLE, DISPOSABLE MISCELLANEOUS 2 TIMES DAILY
Qty: 200 EACH | Refills: 3 | Status: SHIPPED | OUTPATIENT
Start: 2024-06-10

## 2024-06-10 RX ORDER — FOLIC ACID 1 MG/1
TABLET ORAL
Qty: 4 CAPSULE | Refills: 4 | OUTPATIENT
Start: 2024-06-10

## 2024-06-10 NOTE — TELEPHONE ENCOUNTER
Patient called and would to follow up her pen needle and amlodipine refill request,  she only has couple of days left .   She is not taking the Vit D3  50,000 unit anymore for almost 2 months, she does not need any refill of that medication.   She also requested for lisinopril refill, informed that there is still refill available per our record(per patient the last time she picked it up was March 2024).       Medication pended. Routing for protocol.

## 2024-06-10 NOTE — TELEPHONE ENCOUNTER
Refill passed per [facility] protocols.    Requested Prescriptions   Pending Prescriptions Disp Refills    BD PEN NEEDLE ALEKASNDER 2ND GEN 32G X 4 MM Does not apply Misc [Pharmacy Med Name: BD ALEKSANDER 2 GEN PEN NDL 32G 4MM]  5     Sig: INJECT TWICE A DAY       Diabetic Supplies Protocol Passed - 6/10/2024 10:34 AM        Passed - In person appointment or virtual visit in the past 12 mos or appointment in next 3 mos     Recent Outpatient Visits              1 month ago Acute on chronic diastolic congestive heart [facility]    [facility] Visit    7 months ago HTN (hypertension), benign    [facility]    [facility] Visit    8 months ago Vitamin D deficiency    [facility]    [facility]    1 year ago Asthma with COPD (chronic obstructive pulmonary disease) ([facility]    Office Visit    1 year ago Stage 3b chronic kidney disease ([facility]    [facility]          Future Appointments         Provider Department Appt Notes    In 4 months Vashti Link MD [facility]                       AMLODIPINE 5 MG Oral Tab [Pharmacy Med Name: AMLODIPINE BESYLATE 5 MG TAB] 90 tablet 3     Sig: TAKE 1 TABLET (5 MG TOTAL) BY MOUTH DAILY.       Hypertension Medications Protocol Passed - 6/10/2024 10:34 AM        Passed - CMP or BMP in past 12 months        Passed - Last BP reading less than 140/90     BP Readings from Last 1 Encounters:   04/22/24 118/64               Passed - In person appointment or virtual visit in the past 12 mos or appointment in next 3 mos     Recent Outpatient Visits              1 month ago Acute on chronic diastolic congestive  heart failure (HCC)    [facility] Vashti Link MD    Office Visit    7 months ago [facility]    8 months ago [facility] D [facility]    Office Visit    1 year ago Asthma with COPD (chronic obstructive pulmonary disease) [facility]    [facility]    1 year ago Stage 3b chronic kidney disease ([facility]          Future Appointments         Provider Department Appt Notes    In 4 months Vashti Link MD [facility]                     Passed - EGFRCR or GFRNAA > 50     GFR Evaluation  EGFRCR: 62 , resulted on 4/22/2024           Refused Prescriptions Disp Refills    Cholecalciferol (VITAMIN D3) 1.25 MG (40142 UT) Oral Cap [Pharmacy Med Name: VITAMIN D3 1,250 MCG CAPSULE] 4 capsule 4     Sig: TAKE 1 BY MOUTH WEEKLY       There is no refill protocol information for this order

## 2024-06-18 RX ORDER — FLUTICASONE PROPIONATE AND SALMETEROL 250; 50 UG/1; UG/1
1 POWDER RESPIRATORY (INHALATION) EVERY 12 HOURS
Qty: 180 EACH | Refills: 3 | OUTPATIENT
Start: 2024-06-18

## 2024-07-22 DIAGNOSIS — E11.65 UNCONTROLLED TYPE 2 DIABETES MELLITUS WITH HYPERGLYCEMIA (HCC): ICD-10-CM

## 2024-07-23 RX ORDER — LISINOPRIL 5 MG/1
5 TABLET ORAL DAILY
Qty: 90 TABLET | Refills: 3 | Status: SHIPPED | OUTPATIENT
Start: 2024-07-23

## 2024-07-23 RX ORDER — ATORVASTATIN CALCIUM 40 MG/1
40 TABLET, FILM COATED ORAL NIGHTLY
Qty: 90 TABLET | Refills: 3 | Status: SHIPPED | OUTPATIENT
Start: 2024-07-23

## 2024-07-23 RX ORDER — PEN NEEDLE, DIABETIC 32GX 5/32"
1 NEEDLE, DISPOSABLE MISCELLANEOUS 2 TIMES DAILY
Qty: 200 EACH | Refills: 3 | Status: SHIPPED | OUTPATIENT
Start: 2024-07-23

## 2024-07-23 RX ORDER — INSULIN GLARGINE 100 [IU]/ML
55 INJECTION, SOLUTION SUBCUTANEOUS 2 TIMES DAILY
Qty: 45 ML | Refills: 1 | Status: SHIPPED | OUTPATIENT
Start: 2024-07-23

## 2024-07-23 RX ORDER — AMLODIPINE BESYLATE 5 MG/1
5 TABLET ORAL DAILY
Qty: 90 TABLET | Refills: 3 | Status: SHIPPED | OUTPATIENT
Start: 2024-07-23

## 2024-09-05 DIAGNOSIS — E11.65 UNCONTROLLED TYPE 2 DIABETES MELLITUS WITH HYPERGLYCEMIA (HCC): ICD-10-CM

## 2024-09-05 RX ORDER — LISINOPRIL 5 MG/1
5 TABLET ORAL DAILY
Qty: 90 TABLET | Refills: 3 | Status: SHIPPED | OUTPATIENT
Start: 2024-09-05

## 2024-09-05 RX ORDER — METOPROLOL TARTRATE 25 MG/1
25 TABLET, FILM COATED ORAL 2 TIMES DAILY
Qty: 180 TABLET | Refills: 3 | Status: SHIPPED | OUTPATIENT
Start: 2024-09-05

## 2024-09-05 RX ORDER — AMLODIPINE BESYLATE 5 MG/1
5 TABLET ORAL DAILY
Qty: 90 TABLET | Refills: 3 | Status: SHIPPED | OUTPATIENT
Start: 2024-09-05

## 2024-09-23 DIAGNOSIS — E11.65 UNCONTROLLED TYPE 2 DIABETES MELLITUS WITH HYPERGLYCEMIA (HCC): ICD-10-CM

## 2024-09-23 RX ORDER — LISINOPRIL 5 MG/1
5 TABLET ORAL DAILY
Qty: 90 TABLET | Refills: 3 | Status: SHIPPED | OUTPATIENT
Start: 2024-09-23

## 2024-09-23 RX ORDER — AMLODIPINE BESYLATE 5 MG/1
5 TABLET ORAL DAILY
Qty: 90 TABLET | Refills: 3 | Status: SHIPPED | OUTPATIENT
Start: 2024-09-23

## 2024-09-23 RX ORDER — METOPROLOL TARTRATE 25 MG/1
25 TABLET, FILM COATED ORAL 2 TIMES DAILY
Qty: 180 TABLET | Refills: 3 | Status: SHIPPED | OUTPATIENT
Start: 2024-09-23

## 2024-10-01 RX ORDER — FLUTICASONE PROPIONATE AND SALMETEROL 250; 50 UG/1; UG/1
1 POWDER RESPIRATORY (INHALATION) EVERY 12 HOURS
Qty: 180 EACH | Refills: 3 | Status: SHIPPED | OUTPATIENT
Start: 2024-10-01

## 2024-10-01 RX ORDER — ATORVASTATIN CALCIUM 40 MG/1
40 TABLET, FILM COATED ORAL NIGHTLY
Qty: 90 TABLET | Refills: 1 | OUTPATIENT
Start: 2024-10-01

## 2024-10-01 RX ORDER — PEN NEEDLE, DIABETIC 32GX 5/32"
1 NEEDLE, DISPOSABLE MISCELLANEOUS 2 TIMES DAILY
Qty: 200 EACH | Refills: 3 | Status: SHIPPED | OUTPATIENT
Start: 2024-10-01

## 2024-10-01 RX ORDER — FUROSEMIDE 20 MG
20 TABLET ORAL 2 TIMES DAILY
Qty: 180 TABLET | Refills: 3 | Status: SHIPPED | OUTPATIENT
Start: 2024-10-01

## 2024-10-01 NOTE — TELEPHONE ENCOUNTER
Refill passed per [facility] protocols.    Requested Prescriptions   Pending Prescriptions Disp Refills    BD PEN NEEDLE ALEKSANDER 2ND GEN 32G X 4 MM Does not apply Misc [Pharmacy Med Name: BD ALEKSANDER 2 GEN PEN NDL 32G 4MM] 200 3     Sig: Inject 1 Needle as directed 2 (two) times daily.       Diabetic Supplies Protocol Passed - 9/27/2024  9:54 AM        Passed - In person appointment or virtual visit in the past 12 mos or appointment in next 3 mos     Recent Outpatient Visits              5 months ago Acute on chronic diastolic congestive heart failure (HCC)    [facility] Vashti Clark MD    Office Visit    11 months ago HTN (hypertension), benign    [facility]    [facility]    11 months ago Vitamin D deficiency    [facility] Vashti Clark MD    Office Visit    1 year ago Asthma with COPD (chronic obstructive pulmonary disease) ([facility])    [facility] Vashti Clark MD    Office Visit    1 year ago Stage 3b chronic kidney disease ([facility])    [facility] Vashti Clark MD    Office Visit          Future Appointments         Provider Department Appt Notes    In 1 month [facility]                       METFORMIN HCL 1000 MG Oral Tab [Pharmacy Med Name: METFORMIN HCL 1,000 MG TABLET] 180 tablet 3     Sig: TAKE 1 TABLET BY MOUTH TWICE A DAY WITH MEALS       Diabetes Medication Protocol Passed - 9/27/2024  9:54 AM        Passed - Last A1C < 7.5 and within past 6 months     Lab Results   Component Value Date    A1C 6.0 (H) 04/22/2024             Passed - In person appointment or virtual visit in the past 6 mos or appointment in next 3 mos     Recent Outpatient Visits              5 months ago Acute on chronic diastolic  congestive heart failure (HCC)    [facility]    [facility]    11 months ago HTN (hypertension), benign    [facility] Vashti Clark MD    Office Visit    11 months ago Vitamin D deficiency    [facility] Vashti lCark MD    Office Visit    1 year ago Asthma with COPD (chronic obstructive pulmonary disease) ([facility])    [facility] Vashti Clark MD    Office Visit    1 year ago Stage 3b chronic kidney disease ([facility])    [facility]    [facility] Visit          Future Appointments         Provider Department Appt Notes    In 1 month Vashti Link MD [facility]                     Passed - Microalbumin procedure in past 12 months or taking ACE/ARB        Passed - EGFRCR or GFRNAA > 50     GFR Evaluation  EGFRCR: 62 , resulted on 4/22/2024          Passed - GFR in the past 12 months          FLUTICASONE-SALMETEROL 250-50 MCG/ACT Inhalation Aerosol Powder, Breath Activated [Pharmacy Med Name: FLUTICASONE-SALMETEROL 250-50] 180 each 3     Sig: INHALE 1 PUFF INTO THE LUNGS EVERY 12 HOURS       Asthma & COPD Medication Protocol Failed - 9/27/2024  9:54 AM        Failed - Asthma Action Score greater than or equal to 20        Failed - AAP/ACT given in last 12 months     No data recorded  No data recorded  No data recorded  No data recorded          Passed - Appointment in past 6 or next 3 months      Recent Outpatient Visits              5 months ago Acute on chronic diastolic congestive heart failure ([facility]    Office Visit    11 months ago HTN (hypertension), [place]    [facility]     Office Visit    11 months ago Vitamin D deficiency    [facility], Vashti Clark MD    Office Visit    1 year ago Asthma with COPD (chronic obstructive pulmonary disease) (HCC)    [facility]    [facility] Visit    1 year ago Stage 3b chronic kidney disease (HCC)    [facility]    [facility]          Future Appointments         Provider Department Appt Notes    In 1 month [facility]                       FUROSEMIDE 20 MG Oral Tab [Pharmacy Med Name: FUROSEMIDE 20 MG TABLET] 180 tablet 3     Sig: TAKE 1 TABLET BY MOUTH TWICE A DAY       Hypertension Medications Protocol Passed - 9/27/2024  9:54 AM        Passed - CMP or BMP in past 12 months        Passed - Last BP reading less than 140/90     BP Readings from Last 1 Encounters:   04/22/24 118/64               Passed - In person appointment or virtual visit in the past 12 mos or appointment in next 3 mos     Recent Outpatient Visits              5 months ago Acute on chronic diastolic congestive heart failure (HCC)    [facility], Vashti Clark MD    Office Visit    11 months ago HTN (hypertension), benign    [facility]    [facility]    11 months ago Vitamin D deficiency    [facility]    [facility]    1 year ago Asthma with COPD (chronic obstructive pulmonary disease) ([facility]    [facility]    Office Visit    1 year ago Stage 3b chronic kidney disease ([facility])    [facility]    [facility]          Future Appointments         Provider Department  Appt Notes    In 1 month Vashti Link MD [facility]                     Passed - EGFRCR or GFRNAA > 50     GFR Evaluation  EGFRCR: 62 , resulted on 4/22/2024

## 2024-10-01 NOTE — TELEPHONE ENCOUNTER
Please review.  Protocol failed / Has no protocol.     Requested Prescriptions   Pending Prescriptions Disp Refills    fluticasone-salmeterol 250-50 MCG/ACT Inhalation Aerosol Powder, Breath Activated [Pharmacy Med Name: FLUTICASONE-SALMETEROL 250-50] 180 each 3     Sig: Inhale 1 puff into the lungs Q12H.       Asthma & COPD Medication Protocol Failed - 9/27/2024  9:54 AM        Failed - Asthma Action Score greater than or equal to 20        Failed - AAP/ACT given in last 12 months     No data recorded  No data recorded  No data recorded  No data recorded          Passed - Appointment in past 6 or next 3 months      Recent Outpatient Visits              5 months ago Acute on chronic diastolic congestive heart failure (HCC)    [facility]    [facility]    11 months ago HTN (hypertension), benign    [facility]    [facility] Visit    11 months ago Vitamin D deficiency    [facility] Vashti Clark MD    Office Visit    1 year ago Asthma with COPD (chronic obstructive pulmonary disease) ([facility])    [facility]    Office Visit    1 year ago Stage 3b chronic kidney disease ([facility])    [facility] Vashti Clark MD    Office Visit          Future Appointments         Provider Department Appt Notes    In 1 month Vashti Link MD [facility]                      Signed Prescriptions Disp Refills    Insulin Pen Needle (BD PEN NEEDLE ALEKSANDER 2ND GEN) 32G X 4 MM Does not apply Misc 200 each 3     Sig: Inject 1 Needle into the skin 2 (two) times daily.       Diabetic Supplies Protocol Passed - 9/27/2024  9:54 AM        Passed - In person appointment or virtual visit in the past 12 mos or appointment in next 3 mos      Recent Outpatient Visits              5 months ago Acute on chronic diastolic congestive heart failure (HCC)    [facility]    [facility]    11 months ago HTN (hypertension), [facility], Vashti Clark MD    Office Visit    11 months ago Vitamin D deficiency    [facility]    [facility]    1 year ago Asthma with COPD (chronic obstructive pulmonary disease) ([facility]    [facility]    Office Visit    1 year ago Stage 3b chronic kidney disease ([facility])    [facility]    [facility]          Future Appointments         Provider Department Appt Notes    In 1 month [facility]                       metFORMIN HCl 1000 MG Oral Tab 180 tablet 3     Sig: Take 1 tablet (1,000 mg total) by mouth 2 (two) times daily with meals.       Diabetes Medication Protocol Passed - 9/27/2024  9:54 AM        Passed - Last A1C < 7.5 and within past 6 months     Lab Results   Component Value Date    A1C 6.0 (H) 04/22/2024             Passed - In person appointment or virtual visit in the past 6 mos or appointment in next 3 mos     Recent Outpatient Visits              5 months ago Acute on chronic diastolic congestive heart failure (HCC)    [facility], Vashti Clark MD    Office Visit    11 months ago [facility]    Office Visit    11 months ago Vitamin D deficiency    [facility]    Office Visit    1 year ago Asthma with COPD (chronic obstructive pulmonary disease) ([facility])     [facility]    Office Visit    1 year ago Stage 3b chronic kidney disease (HCC)    [facility]    [facility]          Future Appointments         Provider Department Appt Notes    In 1 month Vashti Link MD [facility]                     Passed - Microalbumin procedure in past 12 months or taking ACE/ARB        Passed - EGFRCR or GFRNAA > 50     GFR Evaluation  EGFRCR: 62 , resulted on 4/22/2024          Passed - GFR in the past 12 months          furosemide 20 MG Oral Tab 180 tablet 3     Sig: Take 1 tablet (20 mg total) by mouth 2 (two) times daily.       Hypertension Medications Protocol Passed - 9/27/2024  9:54 AM        Passed - CMP or BMP in past 12 months        Passed - Last BP reading less than 140/90     BP Readings from Last 1 Encounters:   04/22/24 118/64               Passed - In person appointment or virtual visit in the past 12 mos or appointment in next 3 mos     Recent Outpatient Visits              5 months ago Acute on chronic diastolic congestive heart failure (HCC)    [facility] [place] [place] [facility]    Office Visit    11 months ago HTN (hypertension), benign    [facility]    Office Visit    11 months ago Vitamin D deficiency    [facility]    Office Visit    1 year ago Asthma with COPD (chronic obstructive pulmonary disease) (HCC)    [facility]    Office Visit    1 year ago Stage 3b chronic kidney disease (HCC)    [facility]    [facility]          Future Appointments         Provider Department Appt Notes    In 1 month Nikolay  Vashti SAMANIEGO MD [facility]                     Passed - EGFRCR or GFRNAA > 50     GFR Evaluation  EGFRCR: 62 , resulted on 4/22/2024             Future Appointments         Provider Department Appt Notes    In 1 month Vashti Link MD [facility]           Recent Outpatient Visits              5 months ago Acute on chronic diastolic congestive heart failure (HCC)    [facility] Vas[facility] Link MD    Office Visit    11 months ago HTN (hypertension), benign    [facility] Vas[facility] Link MD    Office Visit    11 months ago [facility] D deficiency    [facility]    Office Visit    1 year ago Asthma with COPD (chronic obstructive pulmonary disease) ([facility])    [facility]    Office Visit    1 year ago Stage 3b chronic kidney disease (HCC)    [facility]    Office Visit

## 2024-10-01 NOTE — TELEPHONE ENCOUNTER
Patient requesting pharmacy change to [facility]    Outpatient Medication Detail   Disp Refills Start End    metFORMIN HCl 1000 MG Oral Tab 180 tablet 3 7/23/2024 --    Sig - Route: Take 1 tablet (1,000 mg total) by mouth 2 (two) times daily with meals. - Oral    Sent to pharmacy as: metFORMIN HCl 1000 MG Oral Tablet (GLUCOPHAGE)    E-Prescribing Status: Receipt confirmed by pharmacy (7/23/2024 10:41 AM CDT)    Pharmacy  [facility] [id number]  [place] [phone], [phone]       Outpatient Medication Detail   Disp Refills Start End    Insulin Pen Needle (BD PEN NEEDLE CASSIA 2ND GEN) 32G X 4 MM Does not apply Misc 200 each 3 7/23/2024 --    Sig - Route: Inject 1 Needle as directed 2 (two) times daily. - Injection    Sent to pharmacy as: BD Pen Needle Cassia 2nd Gen 32G X 4 MM (Insulin Pen Needle)    E-Prescribing Status: Receipt confirmed by pharmacy (7/23/2024 10:41 AM CDT)    Pharmacy  [facility] [id number] [facility] [address] [phone], [phone]

## 2024-10-31 ENCOUNTER — OFFICE VISIT (OUTPATIENT)
Dept: INTERNAL MEDICINE CLINIC | Facility: CLINIC | Age: 74
End: 2024-10-31

## 2024-10-31 VITALS
BODY MASS INDEX: 49.2 KG/M2 | TEMPERATURE: 97 F | OXYGEN SATURATION: 99 % | HEIGHT: 62 IN | DIASTOLIC BLOOD PRESSURE: 68 MMHG | WEIGHT: 267.38 LBS | HEART RATE: 76 BPM | SYSTOLIC BLOOD PRESSURE: 112 MMHG

## 2024-10-31 DIAGNOSIS — Z71.85 VACCINE COUNSELING: ICD-10-CM

## 2024-10-31 DIAGNOSIS — M85.89 OSTEOPENIA OF MULTIPLE SITES: ICD-10-CM

## 2024-10-31 DIAGNOSIS — E53.8 VITAMIN B 12 DEFICIENCY: ICD-10-CM

## 2024-10-31 DIAGNOSIS — E11.65 UNCONTROLLED TYPE 2 DIABETES MELLITUS WITH HYPERGLYCEMIA (HCC): ICD-10-CM

## 2024-10-31 DIAGNOSIS — E55.9 VITAMIN D DEFICIENCY: ICD-10-CM

## 2024-10-31 DIAGNOSIS — Z12.31 ENCOUNTER FOR SCREENING MAMMOGRAM FOR MALIGNANT NEOPLASM OF BREAST: ICD-10-CM

## 2024-10-31 DIAGNOSIS — D50.0 IRON DEFICIENCY ANEMIA DUE TO CHRONIC BLOOD LOSS: ICD-10-CM

## 2024-10-31 DIAGNOSIS — I10 HTN (HYPERTENSION), BENIGN: ICD-10-CM

## 2024-10-31 DIAGNOSIS — E78.2 COMBINED HYPERLIPIDEMIA: ICD-10-CM

## 2024-10-31 DIAGNOSIS — T14.8XXA BRUISING: ICD-10-CM

## 2024-10-31 DIAGNOSIS — N18.31 STAGE 3A CHRONIC KIDNEY DISEASE (HCC): Primary | Chronic | ICD-10-CM

## 2024-10-31 DIAGNOSIS — E04.2 MULTINODULAR GOITER (NONTOXIC): ICD-10-CM

## 2024-10-31 LAB
BASOPHILS # BLD AUTO: 0.06 X10(3) UL (ref 0–0.2)
BASOPHILS NFR BLD AUTO: 0.5 %
DEPRECATED HBV CORE AB SER IA-ACNC: 27 NG/ML
DEPRECATED RDW RBC AUTO: 49.6 FL (ref 35.1–46.3)
EOSINOPHIL # BLD AUTO: 0.18 X10(3) UL (ref 0–0.7)
EOSINOPHIL NFR BLD AUTO: 1.4 %
ERYTHROCYTE [DISTWIDTH] IN BLOOD BY AUTOMATED COUNT: 16.2 % (ref 11–15)
HCT VFR BLD AUTO: 43.2 %
HGB BLD-MCNC: 13.3 G/DL
IMM GRANULOCYTES # BLD AUTO: 0.05 X10(3) UL (ref 0–1)
IMM GRANULOCYTES NFR BLD: 0.4 %
INR BLD: 1.06 (ref 0.8–1.2)
IRON SATN MFR SERPL: 7 %
IRON SERPL-MCNC: 32 UG/DL
LYMPHOCYTES # BLD AUTO: 2.44 X10(3) UL (ref 1–4)
LYMPHOCYTES NFR BLD AUTO: 19 %
MCH RBC QN AUTO: 25.9 PG (ref 26–34)
MCHC RBC AUTO-ENTMCNC: 30.8 G/DL (ref 31–37)
MCV RBC AUTO: 84.2 FL
MONOCYTES # BLD AUTO: 0.64 X10(3) UL (ref 0.1–1)
MONOCYTES NFR BLD AUTO: 5 %
NEUTROPHILS # BLD AUTO: 9.5 X10 (3) UL (ref 1.5–7.7)
NEUTROPHILS # BLD AUTO: 9.5 X10(3) UL (ref 1.5–7.7)
NEUTROPHILS NFR BLD AUTO: 73.7 %
PLATELET # BLD AUTO: 294 10(3)UL (ref 150–450)
PROTHROMBIN TIME: 14.4 SECONDS (ref 11.6–14.8)
RBC # BLD AUTO: 5.13 X10(6)UL
TIBC SERPL-MCNC: 434 UG/DL (ref 250–425)
TRANSFERRIN SERPL-MCNC: 291 MG/DL (ref 250–380)
TSI SER-ACNC: 1.28 MIU/ML (ref 0.55–4.78)
VIT B12 SERPL-MCNC: 315 PG/ML (ref 211–911)
WBC # BLD AUTO: 12.9 X10(3) UL (ref 4–11)

## 2024-10-31 PROCEDURE — 99215 OFFICE O/P EST HI 40 MIN: CPT | Performed by: INTERNAL MEDICINE

## 2024-10-31 PROCEDURE — G2211 COMPLEX E/M VISIT ADD ON: HCPCS | Performed by: INTERNAL MEDICINE

## 2024-10-31 PROCEDURE — 1159F MED LIST DOCD IN RCRD: CPT | Performed by: INTERNAL MEDICINE

## 2024-10-31 PROCEDURE — 1160F RVW MEDS BY RX/DR IN RCRD: CPT | Performed by: INTERNAL MEDICINE

## 2024-10-31 PROCEDURE — 1126F AMNT PAIN NOTED NONE PRSNT: CPT | Performed by: INTERNAL MEDICINE

## 2024-10-31 PROCEDURE — 3074F SYST BP LT 130 MM HG: CPT | Performed by: INTERNAL MEDICINE

## 2024-10-31 PROCEDURE — 3078F DIAST BP <80 MM HG: CPT | Performed by: INTERNAL MEDICINE

## 2024-10-31 PROCEDURE — 3008F BODY MASS INDEX DOCD: CPT | Performed by: INTERNAL MEDICINE

## 2024-10-31 RX ORDER — INSULIN LISPRO 100 [IU]/ML
INJECTION, SOLUTION INTRAVENOUS; SUBCUTANEOUS
COMMUNITY
Start: 2023-01-20

## 2024-10-31 NOTE — PATIENT INSTRUCTIONS
ASSESSMENT/PLAN:     Encounter Diagnoses   Name Primary?    Stage 3a chronic kidney disease (HCC) No motrin, ibuprofen, advil, alleve, naprosyn  with these medications.  Hydrate at least 48 oz. water  day.    Yes    Combined hyperlipidemia  Stable.        Multinodular goiter (nontoxic) Check blood. Check US.        Iron deficiency anemia due to chronic blood loss Check blood.        HTN (hypertension), benign Stable. Careful with diet and excercise at least 30 minutes 3-4 times a week. Check blood pressures at different times on different days. Can purchase own blood pressure monitor. If not, check at local pharmacy. Bake foods more and grill occasionally. Avoid fried foods. No salt. Use other seasonings.         Uncontrolled type 2 diabetes mellitus with hyperglycemia (HCC) Stable. Careful with diet and excercise at least 30 minutes 3-4 times a week. Check sugars at different times on different dates. Careful with low sugars. Carry something with you and check sugar if can. Can carry faisal cracker, etc. Decrease carbohydrates. But also, careful with fruits and natural sugars.One serving a day and no more than 1 handful every day. Check feet  every AM and careful with sores and ulcers on feet bilaterally. Check eyes every year with dilated eye exam.  Check sugars.  2-hour postmeal should be less than 140s.  Pre-meal should be 's.  Both equally affected A1c.  Discussed importance of glycemic control to prevent complications of diabetes  -Discussed complications of diabetes include retinopathy, neuropathy, nephropathy and cardiovascular disease  -Discussed ABCs of DM  -Discussed importance of SBGM  -Discussed importance of low CHO diet, recommend 45gm per meal or 135gm per day  Check feet every morning watch for sores and ulcers.  Wearing shoes in the house good comfortable shoes.  If lotion feet put on top and bottom but do not place lotion between the toes.  - Follow up with optho       Vaccine counseling  holding on COVID booster and shingles vaccine and flu shots.       Vitamin B 12 deficiency Check blood.        Vitamin D deficiency Stable.        Osteopenia of multiple sites Check dexa. Take calcium 600 every 12 hrs. With vitamin D 400 IU every  12 hrs. Excerise at least 30 minutes 3-4 times a week. May use calcium citrate as opposed to calcium carbonate which may be better absorbed in the setting of PPI use.  The preferred calcium supplement for people at risk of stone formation is calcium citrate because it helps to increase urinary citrate excretion. We recommend a dose of 200-400 mg if dietary calcium cannot be increased.   lifelong physical activity at all ages is strongly endorsed by the National Osteoporosis Foundation. Exercise recommendations generally should include weight-bearing, muscle-strengthening, and balance training exercises for 30 minutes 5 days per week or 75 minutes twice weekly, often consistent with other general health recommendations.   Weight Bearing  There are two types of osteoporosis exercises that are important for building and maintaining bone density: weight-bearing and muscle-strengthening exercises.  Weight-bearing Exercises  These exercises include activities that make you move against gravity while staying upright. Weight-bearing exercises can be high-impact or low-impact.  High-impact weight-bearing exercises help build bones and keep them strong. If you have broken a bone due to osteoporosis or are at risk of breaking a bone, you may need to avoid high-impact exercises. If you’re not sure, you should check with your healthcare provider.  Examples of high-impact weight-bearing exercises are:  Dancing   Doing high-impact aerobics   Hiking   Jogging/running   Jumping Rope   Stair climbing   Tennis  Low-impact weight-bearing exercises can also help keep bones strong and are a safe alternative if you cannot do high-impact exercises. Examples of low-impact weight-bearing exercises  are:  Using elliptical training machines   Doing low-impact aerobics   Using stair-step machines   Fast walking on a treadmill or outside          Encounter for screening mammogram for malignant neoplasm of breast Check mammogram. Continue self breast exam every month.        [facility]  Address: [address] [place] [address], [place] [address]  Phone: [phone]    Orders Placed This Encounter   Procedures    TSH W Reflex To Free T4    Vitamin B12    Hemoglobin A1C    CBC With Differential With Platelet    Iron And Tibc    Ferritin       Meds This Visit:  Requested Prescriptions      No prescriptions requested or ordered in this encounter       Imaging & Referrals:  None      RTC after 4-22-25 for physical.

## 2024-10-31 NOTE — PROGRESS NOTES
HPI:    Patient ID: Serena Chavez is a 73 year old female.    Chief Complaint   Patient presents with    Follow - Up     Patient states she is here for a follow up.         Patient here for follow up of Diabetes.  Has been taking medications regularly.    Checks sugars 1 times every other day.   Fasting sugars average 90's. No lows.  2 hrs. PP: 140's. Car is finally fixed.   Watches diabetic diet, low salt.  Diabetic Flow sheet      10/31/2024    11:05 AM 10/31/2024    10:08 AM 10/31/2024     9:59 AM 9/23/2024    12:00 AM 9/5/2024    12:00 AM 7/23/2024    12:00 AM   DIABETIC FLOWSHEET ([facility])   BMI   48.91 kg/m2      Lisinopril 5 mg Daily OR 5 mg Daily OR  5 mg Daily OR  5 mg Daily OR  5 mg Daily OR  5 mg Daily OR    Weight (enc vitals)   267 lb 6.4 oz      BP (enc vitals)  148/69 165/78         HISTORY OF DIABETES COMPLICATIONS: :  History of Retinopathy: No.   History of Neuropathy: No.   History of Nephropathy: Yes.      ASSOCIATED COMPLICATIONS:   HTN: Yes.   Hyperlipidemia: Yes.   Coronary Artery Disease:  CAD,  HF, PAD  Cerebrovascular Disease: Yes.     MEALS:  2-3 meals a day  Cooks at home    Hypertension  Patient is here for follow up of hypertension. BP at home: not check.   Has been compliant with medications.  Exercise level: not active and has been following low salt diet.  Weight has been stable. Eats more at home. Daughter cooks more. No added  salt.   Wt Readings from Last 3 Encounters:   10/31/24 267 lb 6.4 oz (121.3 kg)   04/22/24 266 lb 12.8 oz (121 kg)   11/04/23 298 lb 12.8 oz (135.5 kg)     BP Readings from Last 3 Encounters:   10/31/24 148/69   04/22/24 118/64   11/04/23 135/71     Labs:   Lab Results   Component Value Date/Time    GLU 80 04/22/2024 12:09 PM     04/22/2024 12:09 PM    K 4.7 04/22/2024 12:09 PM     04/22/2024 12:09 PM    CO2 26.0 04/22/2024 12:09 PM    CREATSERUM 0.97 04/22/2024 12:09 PM    CA 10.1 04/22/2024 12:09 PM    AST 18 04/22/2024 12:09 PM    ALT 9 (L)  04/22/2024 12:09 PM    TSH 0.858 10/12/2023 12:33 PM    T4F 1.1 10/12/2023 12:33 PM        Lab Results   Component Value Date/Time    CHOLEST 109 04/22/2024 12:09 PM    HDL 34 (L) 04/22/2024 12:09 PM    TRIG 112 04/22/2024 12:09 PM    LDL 54 04/22/2024 12:09 PM    NONHDLC 75 04/22/2024 12:09 PM            Wt Readings from Last 3 Encounters:   10/31/24 267 lb 6.4 oz (121.3 kg)   04/22/24 266 lb 12.8 oz (121 kg)   11/04/23 298 lb 12.8 oz (135.5 kg)     BP Readings from Last 3 Encounters:   10/31/24 148/69   04/22/24 118/64   11/04/23 135/71     Labs:   Lab Results   Component Value Date/Time    GLU 80 04/22/2024 12:09 PM     04/22/2024 12:09 PM    K 4.7 04/22/2024 12:09 PM     04/22/2024 12:09 PM    CO2 26.0 04/22/2024 12:09 PM    CREATSERUM 0.97 04/22/2024 12:09 PM    CA 10.1 04/22/2024 12:09 PM    AST 18 04/22/2024 12:09 PM    ALT 9 (L) 04/22/2024 12:09 PM    TSH 0.858 10/12/2023 12:33 PM    T4F 1.1 10/12/2023 12:33 PM        Lab Results   Component Value Date/Time    CHOLEST 109 04/22/2024 12:09 PM    HDL 34 (L) 04/22/2024 12:09 PM    TRIG 112 04/22/2024 12:09 PM    LDL 54 04/22/2024 12:09 PM    NONHDLC 75 04/22/2024 12:09 PM          HPI      Review of Systems   Constitutional:  Negative for activity change, appetite change, chills, diaphoresis, fatigue, fever and unexpected weight change.   Respiratory:  Negative for apnea, cough, choking, chest tightness, shortness of breath, wheezing and stridor.    Cardiovascular:  Negative for chest pain, palpitations and leg swelling.   Gastrointestinal:  Negative for abdominal distention and abdominal pain.   Endocrine: Negative for cold intolerance, heat intolerance, polydipsia, polyphagia and polyuria.   Neurological:  Negative for dizziness, tremors, seizures, syncope, facial asymmetry, speech difficulty, weakness, light-headedness, numbness and headaches.   All other systems reviewed and are negative.        Current Outpatient Medications   Medication Sig  Dispense Refill    Insulin Lispro, 1 Unit Dial, 100 UNIT/ML Subcutaneous Solution Pen-injector insulin lispro (U-100) 100 unit/mL subcutaneous pen, [RxNorm: 7743588]      Insulin Pen Needle (BD PEN NEEDLE ALEKSANDER 2ND GEN) 32G X 4 MM Does not apply Misc Inject 1 Needle into the skin 2 (two) times daily. 200 each 3    metFORMIN HCl 1000 MG Oral Tab Take 1 tablet (1,000 mg total) by mouth 2 (two) times daily with meals. 180 tablet 3    fluticasone-salmeterol 250-50 MCG/ACT Inhalation Aerosol Powder, Breath Activated Inhale 1 puff into the lungs every 12 (twelve) hours. 180 each 3    furosemide 20 MG Oral Tab Take 1 tablet (20 mg total) by mouth 2 (two) times daily. 180 tablet 3    lisinopril 5 MG Oral Tab Take 1 tablet (5 mg total) by mouth daily. 90 tablet 3    metoprolol tartrate 25 MG Oral Tab Take 1 tablet (25 mg total) by mouth 2 (two) times daily. 180 tablet 3    amLODIPine 5 MG Oral Tab Take 1 tablet (5 mg total) by mouth daily. 90 tablet 3    semaglutide 2 MG/3ML Subcutaneous Solution Pen-injector Inject 0.25 mg into the skin once a week. 1 each 1    diclofenac 1 % External Gel Apply 2 g topically 4 (four) times daily. 300 g 2    atorvastatin 40 MG Oral Tab Take 1 tablet (40 mg total) by mouth nightly. 90 tablet 3    insulin glargine (LANTUS SOLOSTAR) 100 UNIT/ML Subcutaneous Solution Pen-injector Inject 55 Units into the skin 2 (two) times daily. 45 mL 1    albuterol (2.5 MG/3ML) 0.083% Inhalation Nebu Soln Take 3 mL (2.5 mg total) by nebulization every 4 (four) hours as needed for Wheezing. 75 mL 3    Insulin Pen Needle (BD PEN NEEDLE ORIGINAL U/F) 29G X 12.7MM Does not apply Misc Uses qweek. 30 each 0    Cholecalciferol (VITAMIN D3) 1.25 MG (96516 UT) Oral Cap 1 po qweek. 4 capsule 4    Ascorbic Acid 500 MG Oral Chew Tab       naproxen (ALEVE) 220 MG Oral Tab Aleve 220 mg tablet, [RxNorm: 083874]      clotrimazole-betamethasone 1-0.05 % External Cream Apply 1 Application topically 2 (two) times daily. Apply  under breasts and on feet BL but NOT between toes. 90 g 2    albuterol 108 (90 Base) MCG/ACT Inhalation Aero Soln Inhale 2 puffs into the lungs every 4 (four) hours as needed for Wheezing. 8.5 each 5    Blood Gluc Meter Disp-Strips Does not apply Device Checks Q12hrs. Code: E11.65 100 each 5    Glucose Blood (ACCU-CHEK JULIO PLUS) In Vitro Strip DX. E11.65. CHECK BLOOD GLUCOSE EVERY MORNING 50 strip 11    nitroGLYCERIN 0.4 MG Sublingual SL Tab Place 1 tablet (0.4 mg total) under the tongue every 5 (five) minutes as needed for Chest pain. 25 tablet 3    prasugrel 10 MG Oral Tab Take 1 tablet (10 mg total) by mouth daily. 90 tablet 1    Blood Glucose Monitoring Suppl (ACCU-CHEK GUIDE) w/Device Does not apply Kit Inject 1 kit as directed 2 (two) times daily. Checks Q12hrs. Code: E11.65 1 kit 0    Blood Glucose Monitoring Suppl (BLOOD GLUCOSE SYSTEM JW) Does not apply Kit Checks Q12hrs. Code: E11.65 1 kit 0    DICLOFENAC SODIUM 1 % Transdermal Gel APPLY 2 GRAMS TO AFFECTED AREA 4 TIMES A  g 2    acetaminophen 500 MG Oral Tab Take 1 tablet (500 mg total) by mouth every 6 (six) hours as needed for Pain.      Lancet Devices (SIMPLE DIAGNOSTICS LANCING DEV) Does not apply Misc Checks sugar twice a day Dx E11.65, Z79.4 100 each 6    COMFORT EZ PEN NEEDLES 31G X 6 MM Does not apply Misc q12 100 each 6    aspirin 81 MG Oral Tab Take 2 tablets (162 mg total) by mouth daily.      Aspirin-Acetaminophen-Caffeine (EXCEDRIN OR) Take 2 tablets by mouth every 6 (six) hours as needed.        Ferrous Sulfate 325 (65 Fe) MG Oral Tab iron 325 mg (65 mg iron) tablet, [RxNorm: 812026] (Patient not taking: Reported on 10/31/2024)      Lancets Does not apply Misc Checks Q12hrs. Code: E11.65 (Patient not taking: Reported on 10/31/2024) 50 each 11    clotrimazole-betamethasone 1-0.05 % External Cream APPLY EVERY 12 HOURS FOR 2 WEEKS ON DRY SKIN. (Patient not taking: Reported on 10/31/2024) 60 g 3    Blood Glucose Monitoring Suppl  w/Device Does not apply Kit DX E 11.22. (Patient not taking: Reported on 10/31/2024) 1 kit 0    Betamethasone Dipropionate 0.05 % External Ointment Apply q12hrs. For 1 week on R 3rd finger. (Patient not taking: Reported on 10/31/2024) 45 g 1    Clobetasol Propionate 0.05 % External Ointment Apply 1 Application topically 2 (two) times daily. Not to be placed on anywhere on face. (Patient not taking: Reported on 10/31/2024) 30 g 1     Allergies:Allergies[1]    HISTORY:  Past Medical History:    Asthma (HCC)    Asthma with COPD with exacerbation (HCC)    COPD (chronic obstructive pulmonary disease) (HCC)    Coronary atherosclerosis    Diabetes (HCC)    Grieving    High blood pressure    High cholesterol      Past Surgical History:   Procedure Laterality Date    Anesth,open heart surgery  10/03/2016    Cabg      Cath drug eluting stent      Cath percutaneous  transluminal coronary angioplasty      Tubal ligation        Family History   Problem Relation Age of Onset    Heart Disorder Father     Hypertension Father     Diabetes Mother     Heart Disorder Mother     Breast Cancer Mother 62    Heart Disorder Daughter 29        heart attack     Glaucoma Neg       Social History:   Social History     Socioeconomic History    Marital status:    Occupational History    Occupation: Office work retired.     Occupation: Used on work in [employer]. No chemicals expsoures.     Tobacco Use    Smoking status: Former     Current packs/day: 1.00     Types: Cigarettes    Smokeless tobacco: Never   Vaping Use    Vaping status: Never Used   Substance and Sexual Activity    Alcohol use: No     Alcohol/week: 0.0 standard drinks of alcohol    Drug use: No   Social History Narrative    No exposures. Lives in apartment now on first floor.      Social Drivers of Health      [facility] [employer] [employer]    [facility]        PHYSICAL EXAM:   /69 (BP Location: Left arm, Patient Position: Sitting, Cuff Size: adult)    Pulse 76   Temp 97.4 °F (36.3 °C) (Oral)   Ht 5' 2\" (1.575 m)   Wt 267 lb 6.4 oz (121.3 kg)   SpO2 99%   BMI 48.91 kg/m²   BP Readings from Last 3 Encounters:   10/31/24 148/69   04/22/24 118/64   11/04/23 135/71     Wt Readings from Last 3 Encounters:   10/31/24 267 lb 6.4 oz (121.3 kg)   04/22/24 266 lb 12.8 oz (121 kg)   11/04/23 298 lb 12.8 oz (135.5 kg)       Physical Exam  Vitals and nursing note reviewed.   Constitutional:       General: She is not in acute distress.     Appearance: Normal appearance. She is well-developed. She is not ill-appearing, toxic-appearing or diaphoretic.      Interventions: She is not intubated.  Neck:      Thyroid: No thyroid mass or thyromegaly.      Trachea: Trachea and phonation normal.   Cardiovascular:      Rate and Rhythm: Normal rate and regular rhythm.      Pulses: Normal pulses. No decreased pulses.           Carotid pulses are 2+ on the right side and 2+ on the left side.       Radial pulses are 2+ on the right side and 2+ on the left side.        Dorsalis pedis pulses are 2+ on the right side and 2+ on the left side.        Posterior tibial pulses are 2+ on the right side and 2+ on the left side.      Heart sounds: Normal heart sounds, S1 normal and S2 normal.   Pulmonary:      Effort: Pulmonary effort is normal. No tachypnea, bradypnea, accessory muscle usage, prolonged expiration, respiratory distress or retractions. She is not intubated.      Breath sounds: Normal breath sounds and air entry. No stridor, decreased air movement or transmitted upper airway sounds. No decreased breath sounds, wheezing, rhonchi or rales.   Chest:      Chest wall: No tenderness.   Abdominal:      General: There is no distension.      Palpations: Abdomen is soft.      Tenderness: There is no abdominal tenderness.   Musculoskeletal:      Right lower leg: No edema.      Left lower leg: No edema.   Skin:     General: Skin is warm and dry.   Neurological:      Mental Status: She is alert  and oriented to person, place, and time.   Psychiatric:         Behavior: Behavior normal. Behavior is cooperative.         Thought Content: Thought content normal.              ASSESSMENT/PLAN:     Encounter Diagnoses   Name Primary?    Stage 3a chronic kidney disease (HCC) No motrin, ibuprofen, advil, alleve, naprosyn  with these medications.  Hydrate at least 48 oz. water  day.    Yes    Combined hyperlipidemia  Stable.        Multinodular goiter (nontoxic) Check blood. Check US.        Iron deficiency anemia due to chronic blood loss Check blood.        HTN (hypertension), benign Stable. Careful with diet and excercise at least 30 minutes 3-4 times a week. Check blood pressures at different times on different days. Can purchase own blood pressure monitor. If not, check at local pharmacy. Bake foods more and grill occasionally. Avoid fried foods. No salt. Use other seasonings.         Uncontrolled type 2 diabetes mellitus with hyperglycemia (HCC) Stable. Careful with diet and excercise at least 30 minutes 3-4 times a week. Check sugars at different times on different dates. Careful with low sugars. Carry something with you and check sugar if can. Can carry faisal cracker, etc. Decrease carbohydrates. But also, careful with fruits and natural sugars.One serving a day and no more than 1 handful every day. Check feet  every AM and careful with sores and ulcers on feet bilaterally. Check eyes every year with dilated eye exam.  Check sugars.  2-hour postmeal should be less than 140s.  Pre-meal should be 's.  Both equally affected A1c.  Discussed importance of glycemic control to prevent complications of diabetes  -Discussed complications of diabetes include retinopathy, neuropathy, nephropathy and cardiovascular disease  -Discussed ABCs of DM  -Discussed importance of SBGM  -Discussed importance of low CHO diet, recommend 45gm per meal or 135gm per day  Check feet every morning watch for sores and ulcers.   Wearing shoes in the house good comfortable shoes.  If lotion feet put on top and bottom but do not place lotion between the toes.  - Follow up with optho       Vaccine counseling holding on COVID booster and shingles vaccine and flu shots.       Vitamin B 12 deficiency Check blood.        Vitamin D deficiency Stable.        Osteopenia of multiple sites Check dexa. Take calcium 600 every 12 hrs. With vitamin D 400 IU every  12 hrs. Excerise at least 30 minutes 3-4 times a week. May use calcium citrate as opposed to calcium carbonate which may be better absorbed in the setting of PPI use.  The preferred calcium supplement for people at risk of stone formation is calcium citrate because it helps to increase urinary citrate excretion. We recommend a dose of 200-400 mg if dietary calcium cannot be increased.   lifelong physical activity at all ages is strongly endorsed by the National Osteoporosis Foundation. Exercise recommendations generally should include weight-bearing, muscle-strengthening, and balance training exercises for 30 minutes 5 days per week or 75 minutes twice weekly, often consistent with other general health recommendations.   Weight Bearing  There are two types of osteoporosis exercises that are important for building and maintaining bone density: weight-bearing and muscle-strengthening exercises.  Weight-bearing Exercises  These exercises include activities that make you move against gravity while staying upright. Weight-bearing exercises can be high-impact or low-impact.  High-impact weight-bearing exercises help build bones and keep them strong. If you have broken a bone due to osteoporosis or are at risk of breaking a bone, you may need to avoid high-impact exercises. If you’re not sure, you should check with your healthcare provider.  Examples of high-impact weight-bearing exercises are:  Dancing   Doing high-impact aerobics   Hiking   Jogging/running   Jumping Rope   Stair climbing    Tennis  Low-impact weight-bearing exercises can also help keep bones strong and are a safe alternative if you cannot do high-impact exercises. Examples of low-impact weight-bearing exercises are:  Using elliptical training machines   Doing low-impact aerobics   Using stair-step machines   Fast walking on a treadmill or outside          Encounter for screening mammogram for malignant neoplasm of breast Check mammogram. Continue self breast exam every month.        [facility]  Address: [address] [place] [address], [place] [address]  Phone: [phone]    Orders Placed This Encounter   Procedures    TSH W Reflex To Free T4    Vitamin B12    Hemoglobin A1C    CBC With Differential With Platelet    Iron And Tibc    Ferritin       Meds This Visit:  Requested Prescriptions      No prescriptions requested or ordered in this encounter       Imaging & Referrals:  None      RTC after 4-22-25 for physical.          [1] Not on File

## 2024-11-01 ENCOUNTER — TELEPHONE (OUTPATIENT)
Dept: INTERNAL MEDICINE CLINIC | Facility: CLINIC | Age: 74
End: 2024-11-01

## 2024-11-01 PROBLEM — D64.9 ANEMIA: Status: ACTIVE | Noted: 2024-01-01

## 2024-11-01 PROBLEM — D64.9 ANEMIA: Status: ACTIVE | Noted: 2024-11-01

## 2024-11-01 LAB
EST. AVERAGE GLUCOSE BLD GHB EST-MCNC: 131 MG/DL (ref 68–126)
HBA1C MFR BLD: 6.2 % (ref ?–5.7)

## 2024-11-01 NOTE — TELEPHONE ENCOUNTER
Verified name and . Patient was advised of the provider's result notes. Patient verbalizes understanding and agrees with plan.     Provided number for central scheduling [phone].     Provided phone number for gastroenterology:  [phone]      CBC Normal (red blood cells and platelets), iron storage levels are better but still low.  Iron levels are low.  Would recommend iron 65 twice a day with vitamin C 500 once a day with folic acid 1 mg a day of all available over-the-counter.  Recheck iron levels in 3 months.  Orders written.  Would really recommend to see GI for a full evaluation of why anemic.  Not having.'s menstrual cycles anymore there is a reason for the anemia.  White blood cell count is slightly elevated.  As has been in the past.  Could be a marker of inflammation.  Clotting factors are normal.  B12 level is low but better than prior.  Goal is between 400-800.  Would recommend B12 over-the-counter thousand a day and recheck B12 level in 3 months.  Orders written.  Thyroid is good.  Hemoglobin A1c which is a 3-month average of sugars looks good.  But careful starting to creep up.  Goal is 6.5 or less.   Written by Vashti Link MD on 2024  7:34 AM CDT

## 2024-11-01 NOTE — PROGRESS NOTES
CBC Normal (red blood cells and platelets), iron storage levels are better but still low.  Iron levels are low.  Would recommend iron 65 twice a day with vitamin C 500 once a day with folic acid 1 mg a day of all available over-the-counter.  Recheck iron levels in 3 months.  Orders written.  Would really recommend to see GI for a full evaluation of why anemic.  Not having.'s menstrual cycles anymore there is a reason for the anemia.  White blood cell count is slightly elevated.  As has been in the past.  Could be a marker of inflammation.  Clotting factors are normal.  B12 level is low but better than prior.  Goal is between 400-800.  Would recommend B12 over-the-counter thousand a day and recheck B12 level in 3 months.  Orders written.  Thyroid is good.   Hemoglobin A1c which is a 3-month average of sugars looks good.  But careful starting to creep up.  Goal is 6.5 or less.

## 2024-11-01 NOTE — TELEPHONE ENCOUNTER
CT orders are in the system to be done.  Especially with anemia.  Also would recommend following up with gastro.

## 2024-11-06 ENCOUNTER — PATIENT MESSAGE (OUTPATIENT)
Dept: OTHER | Age: 74
End: 2024-11-06

## 2024-11-06 RX ORDER — ATORVASTATIN CALCIUM 40 MG/1
40 TABLET, FILM COATED ORAL NIGHTLY
Qty: 90 TABLET | Refills: 0 | Status: SHIPPED | OUTPATIENT
Start: 2024-11-06

## 2024-11-06 NOTE — TELEPHONE ENCOUNTER
Refill passed per [facility] protocols.    Requested Prescriptions   Pending Prescriptions Disp Refills    ATORVASTATIN 40 MG Oral Tab [Pharmacy Med Name: ATORVASTATIN 40 MG TABLET] 90 tablet 0     Sig: TAKE 1 TABLET BY MOUTH EVERY DAY AT NIGHT       Cholesterol Medication Protocol Passed - 11/6/2024 11:30 AM        Passed - ALT < 80     Lab Results   Component Value Date    ALT 9 (L) 04/22/2024             Passed - ALT resulted within past year        Passed - Lipid panel within past 12 months     Lab Results   Component Value Date    CHOLEST 109 04/22/2024    TRIG 112 04/22/2024    HDL 34 (L) 04/22/2024    LDL 54 04/22/2024    VLDL 16 04/22/2024    NONHDLC 75 04/22/2024             Passed - In person appointment or virtual visit in the past 12 mos or appointment in next 3 mos     Recent Outpatient Visits              6 days ago Stage 3a chronic kidney disease (H[facility]    [facility]    6 months ago Acute on chronic diastolic congestive heart failure (HCC)    [facility]    [facility] Visit    1 year ago HTN (hypertension), benign    [facility]    [facility]    1 year ago [facility] D [facility]    [facility]    1 year ago Asthma with COPD (chronic obstructive pulmonary disease) ([facility]    [facility] Visit          Future Appointments         Provider Department Appt Notes    In 6 months Vashti Link MD [facility]

## 2024-11-06 NOTE — TELEPHONE ENCOUNTER
Patient is out of Atorvastatin. Send urgently to: verfied pharmacy:  [id number] - [place], [place] - [address] [phone], [phone]   [address] [facility] [address]   Phone: [phone] Fax: [phone]

## 2024-11-12 ENCOUNTER — MED REC SCAN ONLY (OUTPATIENT)
Dept: INTERNAL MEDICINE CLINIC | Facility: CLINIC | Age: 74
End: 2024-11-12

## 2024-11-18 RX ORDER — INSULIN GLARGINE 100 [IU]/ML
54 INJECTION, SOLUTION SUBCUTANEOUS EVERY 12 HOURS
Qty: 96 ML | Refills: 3 | Status: SHIPPED | OUTPATIENT
Start: 2024-11-18

## 2024-11-18 RX ORDER — ATORVASTATIN CALCIUM 40 MG/1
40 TABLET, FILM COATED ORAL NIGHTLY
Qty: 90 TABLET | Refills: 0 | OUTPATIENT
Start: 2024-11-18

## 2024-11-18 NOTE — TELEPHONE ENCOUNTER
Refill passed per [facility] protocol.    Please review pended refill request as unable to refill due to high/very high drug interaction warning copied here:     High  High Dose: insulin glargine, 54 Units, Subcutaneous, Every 12 hoursDaily dose of 108 Units exceeds recommended maximum of 60.65 Units by 79%      Requested Prescriptions   Pending Prescriptions Disp Refills    LANTUS SOLOSTAR 100 UNIT/ML Subcutaneous Solution Pen-injector [Pharmacy Med Name: LANTUS SOLOSTAR 100 UNIT/ML]  3     Sig: INJECT 54 UNITS INTO THE SKIN EVERY 12 (TWELVE) HOURS.       Diabetes Medication Protocol Passed - 11/18/2024  7:31 AM        Passed - Last A1C < 7.5 and within past 6 months     Lab Results   Component Value Date    A1C 6.2 (H) 10/31/2024             Passed - In person appointment or virtual visit in the past 6 mos or appointment in next 3 mos     Recent Outpatient Visits              2 weeks ago Stage 3a chronic kidney disease (HCC)    [facility] Vashti Link MD    Office Visit    7 months ago [facility] on chronic diastolic congestive heart [facility]    1 year ago [facility] (hypertension[facility]    1 year ago Vitamin D deficiency    [facility]    1 year ago Asthma with COPD (chronic obstructive pulmonary disease) [facility]          Future Appointments         Provider Department Appt Notes    In 5 months Vashti Link MD [facility]                     Passed - Microalbumin procedure in past 12 months or taking ACE/ARB        Passed - EGFRCR or GFRNAA > 50     GFR  Evaluation  EGFRCR: 62 , resulted on 4/22/2024          Passed - GFR in the past 12 months         Refused Prescriptions Disp Refills    ATORVASTATIN 40 MG Oral Tab [Pharmacy Med Name: ATORVASTATIN 40 MG TABLET] 90 tablet 0     Sig: TAKE 1 TABLET BY MOUTH EVERY DAY AT NIGHT       Cholesterol Medication Protocol Passed - 11/18/2024  7:31 AM        Passed - ALT < 80     Lab Results   Component Value Date    ALT 9 (L) 04/22/2024             Passed - ALT resulted within past year        Passed - Lipid panel within past 12 months     Lab Results   Component Value Date    CHOLEST 109 04/22/2024    TRIG 112 04/22/2024    HDL 34 (L) 04/22/2024    LDL 54 04/22/2024    VLDL 16 04/22/2024    NONHDLC 75 04/22/2024             Passed - In person appointment or virtual visit in the past 12 mos or appointment in next 3 mos     Recent Outpatient Visits              2 weeks ago Stage 3a chronic kidney disease (HCC)    [facility] Vashti Link MD    Office Visit    7 months ago Acute on chronic diastolic congestive heart failure (HCC)    [facility] Vashti Link MD    Office Visit    1 year ago HTN (hypertension), benign    [facility] Vashti Link MD    Office Visit    1 year ago Vitamin D deficiency    [facility] Vashti Link MD    Office Visit    1 year ago Asthma with COPD (chronic obstructive pulmonary disease) ([facility])    [facility] Vashti Link MD    Office Visit          Future Appointments         Provider Department Appt Notes    In 5 months Vashti Link MD [facility]                        Future Appointments         Provider Department Appt Notes    In 5 months Vashti Link MD [facility]            Recent Outpatient Visits              2 weeks ago Stage 3a chronic kidney disease (HCC)    [facility] Vashti Clark MD    Office Visit    7 months ago Acute on chronic diastolic congestive heart failure (HCC)    [facility] Vashti Clark MD    Office Visit    1 year ago HTN (hypertension), benign    [facility] Vashti Clark MD    Office Visit    1 year ago Vitamin D deficiency    [facility] Vashti Clark MD    Office Visit    1 year ago Asthma with COPD (chronic obstructive pulmonary disease) ([facility])    [facility] Vashti Clark MD    Office Visit

## 2024-12-03 ENCOUNTER — TELEPHONE (OUTPATIENT)
Dept: INTERNAL MEDICINE CLINIC | Facility: CLINIC | Age: 74
End: 2024-12-03

## 2024-12-03 NOTE — TELEPHONE ENCOUNTER
Called patient at Dr. Finn request to check and see it the patient received her testing.  We are continuing to receive a request for a new prescription.  I will await her daughters call.

## 2024-12-03 NOTE — TELEPHONE ENCOUNTER
Spoke with patient, Date of Birth verified  Patient was informed of below recommendation, she stated she doesn't need diabetic supplies at this time.       VAN

## 2024-12-26 RX ORDER — ATORVASTATIN CALCIUM 40 MG/1
40 TABLET, FILM COATED ORAL NIGHTLY
Qty: 90 TABLET | Refills: 0 | Status: SHIPPED | OUTPATIENT
Start: 2024-12-26

## 2024-12-26 NOTE — TELEPHONE ENCOUNTER
Refill passed per [facility] protocol.   Requested Prescriptions   Pending Prescriptions Disp Refills    ATORVASTATIN 40 MG Oral Tab [Pharmacy Med Name: ATORVASTATIN 40 MG TABLET] 90 tablet 0     Sig: TAKE 1 TABLET BY MOUTH EVERY DAY AT NIGHT       Cholesterol Medication Protocol Passed - 12/26/2024 11:06 AM        Passed - ALT < 80     Lab Results   Component Value Date    ALT 9 (L) 04/22/2024             Passed - ALT resulted within past year        Passed - Lipid panel within past 12 months     Lab Results   Component Value Date    CHOLEST 109 04/22/2024    TRIG 112 04/22/2024    HDL 34 (L) 04/22/2024    LDL 54 04/22/2024    VLDL 16 04/22/2024    NONHDLC 75 04/22/2024             Passed - In person appointment or virtual visit in the past 12 mos or appointment in next 3 mos     Recent Outpatient Visits              1 month ago Stage 3a chronic kidney disease (HCC)    [facility] Vashti Link MD    Office Visit    8 months ago Acute on chronic diastolic congestive heart failure ([facility]    1 year ago HTN (hypertension), benign    [facility]    [facility]    1 year ago Vitamin D deficiency    [facility]    [facility]    1 year ago Asthma with COPD (chronic obstructive pulmonary disease) ([facility]          Future Appointments         Provider Department Appt Notes    In 4 months Vashti Link MD [facility]                         Recent Outpatient Visits              1 month ago Stage 3a chronic kidney disease ([facility]    [facility]     8 months ago Acute on chronic diastolic congestive heart failure (HCC)    [facility] Vashti Link MD    Office Visit    1 year ago HTN (hypertension), benign    [facility] Vashti Link MD    Office Visit    1 year ago Vitamin D deficiency    [facility] Vashti Link MD    Office Visit    1 year ago Asthma with COPD (chronic obstructive pulmonary disease) ([facility])    [facility] Vashti Link MD    Office Visit           Future Appointments         Provider Department Appt Notes    In 4 months Vashti Link MD [facility]

## 2025-01-01 ENCOUNTER — TELEPHONE (OUTPATIENT)
Dept: INTERNAL MEDICINE CLINIC | Facility: CLINIC | Age: 75
End: 2025-01-01

## 2025-01-17 ENCOUNTER — MED REC SCAN ONLY (OUTPATIENT)
Dept: INTERNAL MEDICINE CLINIC | Facility: CLINIC | Age: 75
End: 2025-01-17

## 2025-02-08 NOTE — TELEPHONE ENCOUNTER
On call note  Received call from  [facility]. Dr. Gupta.  Patient was brought in to ER, suffered cardiac arrest and  today.   Death certificate will be faxed to office for completion.

## 2025-02-24 NOTE — TELEPHONE ENCOUNTER
Called Med [facility] for [facility], unable to obtain records w/out pt or POA's signature.   Please advise

## 2025-02-24 NOTE — TELEPHONE ENCOUNTER
Just received the death certificate today.  To fill out though I need the records from [facility].

## 2025-02-27 ENCOUNTER — MED REC SCAN ONLY (OUTPATIENT)
Dept: INTERNAL MEDICINE CLINIC | Facility: CLINIC | Age: 75
End: 2025-02-27

## (undated) NOTE — LETTER
[facility], [place]  Authorization for Invasive Procedures  1.  I hereby authorize Dr. Ha Cohen , my physician and whomever may be designated as the doctor's assistant, to perform the following operation and/or procedure:   Left  He 5. I consent to the photographing of the operations or procedures to be performed for the purposes of advancing medicine, science, and/or education, provided my identity is not revealed.  If the procedure has been videotaped, the physician/surgeon will obta __________ Time: ___________    Statement of Physician  My signature below affirms that prior to the time of the procedure, I have explained to the patient and/or her legal representative, the risks and benefits involved in the proposed treatment and any r

## (undated) NOTE — LETTER
[address], [place]  Authorization for Invasive Procedures  1.  I hereby authorize  Dr. Ha Cohen , my physician and whomever may be designated as the doctor's assistant, to perform the following operation and/or procedure: Cardiac C result of recieving a blood transfusion an/or blood producst. The following are some, but not all, of the potential risks that can occur: fever and allergic reactions, hemolytic reactions, transmission of disease such as hepatitis, AIDS, cytomegalovirus (C administration to me including the risks and benefits. I consent to the administration of sedation/analgesia as may be necessary or desirable in the judgment of my physician.      Signature of Patient:  ________________________________________________ Date:
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
## (undated) NOTE — LETTER
February 28, 2019    [place] [address] [facility]    Dear Dwight Davis: It was a pleasure speaking with you over the phone recently.  To follow up, I wanted to send you some contact information to utilize when you have

## (undated) NOTE — LETTER
ASTHMA ACTION PLAN for Tyron Carrion     : 1950     Date: 19  Doctor:  Lele Augustin. Dom Patel MD  Phone for doctor or clinic: [address] , [address] [place] [address] [phone]      ACT Score: 17    AC Inhale 2 puffs into the lungs every 4 (four) hours as needed for Wheezing.      SYMBICORT 160-4.5 MCG/ACT Inhalation Aerosol    TAKE 1 PUFF BY MOUTH TWICE A DAY     albuterol sulfate (2.5 MG/3ML) 0.083% Inhalation Nebu Soln    Take 3 mL (2.5 mg total) by n

## (undated) NOTE — LETTER
[facility]  [address] [facility] [address]  [phone]  [phone]  Authorization for Imaging Procedure    1. I hereby authorize Dr. Lamin Spencer     my physician and his/her assistants (if applicable), which may include medical students, residents, and/or fellows, to perform the following procedure and administer such anesthesia as may be determined necessary by my physician: ULTRASOUND GUIDED FINE NEEDLE ASPIRATION OF LEFT THYROID NODULE on [address]. 2.  I recognize that during the procedure, unforeseen conditions may necessitate additional or different procedures than those listed above. I, therefore, further authorize and request that the above-named physician, assistants, or designees perform such procedures as are, in their judgment, necessary and desirable. 3.  My physician has discussed prior to my procedure the potential benefits, risks and side effects of this procedure; the likelihood of achieving goals; and potential problems that might occur during recuperation. They also discussed reasonable alternatives to the procedure, including risks, benefits, and side effects related to the alternatives and risks related to not receiving this procedure. I have had all my questions answered and I acknowledge that no guarantee has been made as to the result that may be obtained. 4.  Should the need arise during my procedure, which includes change of level of care prior to discharge, I also consent to the administration of blood and/or blood products. Further, I understand that despite careful testing and screening of blood or blood products by collecting agencies, I may still be subject to ill effects as a result of receiving a blood transfusion and/or blood products.  The following are some, but not all, of the potential risks that can occur: fever and allergic reactions, hemolytic reactions, transmission of diseases such as Hepatitis, AIDS and Cytomegalovirus (CMV) and fluid overload. In the event that I wish to have an autologous transfusion of my own blood, or a directed donor transfusion, I will discuss this with my physician. Check only if Refusing Blood or Blood Products  I understand refusal of blood or blood products as deemed necessary by my physician may have serious consequences to my condition to include possible death. I hereby assume responsibility for my refusal and release the hospital, its personnel, and my physicians from any responsibility for the consequences of my refusal.   [  ] Patient Refuses Blood      5. I authorize the use of any specimen, organs, tissues, body parts or foreign objects that may be removed from my body during the procedure for diagnosis, research or teaching purposes and their subsequent disposal by hospital authorities. I also authorize the release of specimen test results and/or written reports to my treating physician on the hospital medical staff or other referring or consulting physicians involved in my care, at the discretion of the Pathologist or my treating physician. 6.  I consent to the photographing or videotaping of the procedures to be performed, including appropriate portions of my body for medical, scientific, or educational purposes, provided my identity is not revealed by the pictures or by descriptive texts accompanying them. If the procedure has been photographed/videotaped, the physician will obtain the original picture, image, videotape or CD. The hospital will not be responsible for storage, release or maintenance of the picture, image, tape or CD.   7.  I consent to the presence of a  or observers in the operating room as deemed necessary by my physician or their designees. 8.  I recognize that in the event my procedure results in extended X-Ray/fluoroscopy time, I may develop a skin reaction. 9.   If I have a Do Not Attempt Resuscitation (DNAR) order in place, that status will be suspended while in the operating room, procedural suite, and during the recovery period unless otherwise explicitly stated by me (or a person authorized to consent on my behalf). The performing physician or my attending physician will determine when the applicable recovery period ends for purposes of reinstating the DNAR order. 10.  I acknowledge that my physician has explained sedation/analgesia administration to me including the risk and benefits I consent to the administration of sedation/analgesia as may be necessary or desirable in the judgment of my physician. I CERTIFY THAT I HAVE READ AND FULLY UNDERSTAND THE ABOVE CONSENT FOR THE PROCEDURE.      Signature of Patient: _____________________________________________________________  Responsible person in case of minor, unconscious: ____________________________________  Relationship to patient:  __________________________________________________________    Signature of Witness: _______________________________Date: _________Time: __________    Signature of Provider: _______________________________Date: _________Time: __________    Patient Name: Benjamin Willis : 1950  Printed: 2022   Medical Record #: [id number]

## (undated) NOTE — Clinical Note
April 27, 2017    [address] [facility] [place] [address]     Patient: Alejandro Beckham   YOB: 1950   Date of Visit: 4/27/2017       Dear Dr. Link Poole MD:    Thank you for referring Billy Sumner to me for evaluation.  Here is m (two) times daily with meals. Disp: 180 tablet Rfl: 0   METOPROLOL TARTRATE 25 MG Oral Tab TAKE 1 TABLET (25 MG TOTAL) BY MOUTH 2 (TWO) TIMES DAILY. Disp: 60 tablet Rfl: 3   glimepiride 1 MG Oral Tab Take 1 tablet (1 mg total) by mouth 2 (two) times daily. Last edited by Lzi Brannon, OD on 4/27/2017 11:22 AM. (History)          PHYSICAL EXAM:     Base Eye Exam     Visual Acuity (Snellen - Linear)      Right Left   Dist cc 20/60 20/40   Near cc 9pt 4pt       Correction:  Glasses      Tonometry (Non-contact a No treatment is required. Will continue to observe.     Diabetes type 2, controlled ([facility] [address]  I advised patient that there is no background diabetic retinopathy in either eye and that they should continue to keep their blood sugar under control and continue to

## (undated) NOTE — MR AVS SNAPSHOT
[place]  [address]  [phone]               Thank you for choosing us for your health care visit with [facility], OD.   We are glad to serve you and happy to provide you with this summary of This list is accurate as of: 4/27/17 12:08 PM.  Always use your most recent med list.                * albuterol sulfate (2.5 MG/3ML) 0.083% Nebu   Take 3 mL by nebulization every 4 (four) hours as needed for Wheezing.    Commonly known as:  VENTOLIN simvastatin 40 MG Tabs   Take 1 tablet (40 mg total) by mouth nightly. Commonly known as:  ZOCOR           * Notice: This list has 4 medication(s) that are the same as other medications prescribed for you.  Read the directions carefully, and ask your do